# Patient Record
Sex: FEMALE | Race: WHITE | NOT HISPANIC OR LATINO | Employment: FULL TIME | URBAN - NONMETROPOLITAN AREA
[De-identification: names, ages, dates, MRNs, and addresses within clinical notes are randomized per-mention and may not be internally consistent; named-entity substitution may affect disease eponyms.]

---

## 2017-10-13 ENCOUNTER — TRANSCRIBE ORDERS (OUTPATIENT)
Dept: ADMINISTRATIVE | Facility: HOSPITAL | Age: 47
End: 2017-10-13

## 2017-10-13 DIAGNOSIS — Z12.31 VISIT FOR SCREENING MAMMOGRAM: Primary | ICD-10-CM

## 2017-10-19 ENCOUNTER — APPOINTMENT (OUTPATIENT)
Dept: MAMMOGRAPHY | Facility: HOSPITAL | Age: 47
End: 2017-10-19

## 2017-10-23 ENCOUNTER — HOSPITAL ENCOUNTER (OUTPATIENT)
Dept: MAMMOGRAPHY | Facility: HOSPITAL | Age: 47
Discharge: HOME | End: 2017-10-23
Admitting: PHYSICIAN ASSISTANT

## 2017-10-23 DIAGNOSIS — Z12.31 VISIT FOR SCREENING MAMMOGRAM: ICD-10-CM

## 2017-10-23 PROCEDURE — 77063 BREAST TOMOSYNTHESIS BI: CPT

## 2017-10-23 PROCEDURE — 77063 BREAST TOMOSYNTHESIS BI: CPT | Performed by: RADIOLOGY

## 2017-10-23 PROCEDURE — G0202 SCR MAMMO BI INCL CAD: HCPCS

## 2017-10-23 PROCEDURE — 77067 SCR MAMMO BI INCL CAD: CPT | Performed by: RADIOLOGY

## 2018-11-15 ENCOUNTER — HOSPITAL ENCOUNTER (OUTPATIENT)
Dept: MAMMOGRAPHY | Facility: HOSPITAL | Age: 48
Discharge: HOME | End: 2018-11-15
Admitting: PHYSICIAN ASSISTANT

## 2018-11-15 DIAGNOSIS — Z12.39 SCREENING BREAST EXAMINATION: ICD-10-CM

## 2018-11-15 PROCEDURE — 77067 SCR MAMMO BI INCL CAD: CPT | Performed by: RADIOLOGY

## 2018-11-15 PROCEDURE — 77063 BREAST TOMOSYNTHESIS BI: CPT | Performed by: RADIOLOGY

## 2018-11-15 PROCEDURE — 77063 BREAST TOMOSYNTHESIS BI: CPT

## 2018-11-15 PROCEDURE — 77067 SCR MAMMO BI INCL CAD: CPT

## 2019-06-30 ENCOUNTER — TRANSCRIBE ORDERS (OUTPATIENT)
Dept: ADMINISTRATIVE | Facility: HOSPITAL | Age: 49
End: 2019-06-30

## 2019-06-30 ENCOUNTER — HOSPITAL ENCOUNTER (OUTPATIENT)
Dept: GENERAL RADIOLOGY | Facility: HOSPITAL | Age: 49
Discharge: HOME | End: 2019-06-30
Admitting: PHYSICIAN ASSISTANT

## 2019-06-30 DIAGNOSIS — M25.551 RIGHT HIP PAIN: Primary | ICD-10-CM

## 2019-06-30 PROCEDURE — 73502 X-RAY EXAM HIP UNI 2-3 VIEWS: CPT

## 2019-06-30 PROCEDURE — 73502 X-RAY EXAM HIP UNI 2-3 VIEWS: CPT | Performed by: RADIOLOGY

## 2020-01-09 ENCOUNTER — HOSPITAL ENCOUNTER (OUTPATIENT)
Dept: MAMMOGRAPHY | Facility: HOSPITAL | Age: 50
Discharge: HOME | End: 2020-01-09
Admitting: PHYSICIAN ASSISTANT

## 2020-01-09 DIAGNOSIS — Z12.31 VISIT FOR SCREENING MAMMOGRAM: ICD-10-CM

## 2020-01-09 PROCEDURE — 77063 BREAST TOMOSYNTHESIS BI: CPT | Performed by: RADIOLOGY

## 2020-01-09 PROCEDURE — 77063 BREAST TOMOSYNTHESIS BI: CPT

## 2020-01-09 PROCEDURE — 77067 SCR MAMMO BI INCL CAD: CPT

## 2020-01-09 PROCEDURE — 77067 SCR MAMMO BI INCL CAD: CPT | Performed by: RADIOLOGY

## 2020-01-31 ENCOUNTER — LAB (OUTPATIENT)
Dept: LAB | Facility: HOSPITAL | Age: 50
End: 2020-01-31

## 2020-01-31 ENCOUNTER — TRANSCRIBE ORDERS (OUTPATIENT)
Dept: ADMINISTRATIVE | Facility: HOSPITAL | Age: 50
End: 2020-01-31

## 2020-01-31 DIAGNOSIS — E03.9 HYPOTHYROIDISM, UNSPECIFIED TYPE: Primary | ICD-10-CM

## 2020-01-31 DIAGNOSIS — E03.9 HYPOTHYROIDISM, UNSPECIFIED TYPE: ICD-10-CM

## 2020-01-31 LAB — TSH SERPL DL<=0.05 MIU/L-ACNC: 0.22 UIU/ML (ref 0.27–4.2)

## 2020-01-31 PROCEDURE — 36415 COLL VENOUS BLD VENIPUNCTURE: CPT

## 2020-01-31 PROCEDURE — 84443 ASSAY THYROID STIM HORMONE: CPT

## 2020-02-19 ENCOUNTER — TRANSCRIBE ORDERS (OUTPATIENT)
Dept: ADMINISTRATIVE | Facility: HOSPITAL | Age: 50
End: 2020-02-19

## 2020-02-19 DIAGNOSIS — R13.10 DYSPHAGIA, UNSPECIFIED TYPE: Primary | ICD-10-CM

## 2020-02-21 ENCOUNTER — HOSPITAL ENCOUNTER (OUTPATIENT)
Dept: ULTRASOUND IMAGING | Facility: HOSPITAL | Age: 50
Discharge: HOME | End: 2020-02-21
Admitting: PHYSICIAN ASSISTANT

## 2020-02-21 DIAGNOSIS — R13.10 DYSPHAGIA, UNSPECIFIED TYPE: ICD-10-CM

## 2020-02-21 PROCEDURE — 76536 US EXAM OF HEAD AND NECK: CPT

## 2020-02-21 PROCEDURE — 76536 US EXAM OF HEAD AND NECK: CPT | Performed by: RADIOLOGY

## 2020-08-20 ENCOUNTER — OFFICE VISIT (OUTPATIENT)
Dept: SURGERY | Facility: CLINIC | Age: 50
End: 2020-08-20

## 2020-08-20 VITALS — BODY MASS INDEX: 30.45 KG/M2 | WEIGHT: 194 LBS | HEIGHT: 67 IN

## 2020-08-20 DIAGNOSIS — E65 ABDOMINAL PANNUS: Primary | ICD-10-CM

## 2020-08-20 PROCEDURE — 99203 OFFICE O/P NEW LOW 30 MIN: CPT | Performed by: SURGERY

## 2020-08-20 RX ORDER — SUMATRIPTAN 50 MG/1
50 TABLET, FILM COATED ORAL
Status: ON HOLD | COMMUNITY
End: 2020-11-03

## 2020-08-20 RX ORDER — GABAPENTIN 600 MG/1
600 TABLET ORAL 3 TIMES DAILY
COMMUNITY
Start: 2015-07-09 | End: 2021-08-06

## 2020-08-20 RX ORDER — OXYBUTYNIN CHLORIDE 5 MG/1
5 TABLET ORAL 2 TIMES DAILY
COMMUNITY
Start: 2015-07-09 | End: 2022-11-11 | Stop reason: SDUPTHER

## 2020-08-20 RX ORDER — BUPROPION HYDROCHLORIDE 100 MG/1
100 TABLET ORAL 3 TIMES DAILY
Status: ON HOLD | COMMUNITY
Start: 2015-07-09 | End: 2020-11-03

## 2020-08-20 RX ORDER — MELOXICAM 15 MG/1
15 TABLET ORAL DAILY
COMMUNITY
Start: 2015-07-09 | End: 2021-08-06

## 2020-08-20 RX ORDER — MONTELUKAST SODIUM 10 MG/1
10 TABLET ORAL NIGHTLY
COMMUNITY
End: 2021-08-06

## 2020-08-20 RX ORDER — LEVOTHYROXINE SODIUM 137 UG/1
112 TABLET ORAL DAILY
Status: ON HOLD | COMMUNITY
Start: 2015-07-09 | End: 2020-11-03

## 2020-08-24 PROBLEM — E65 ABDOMINAL PANNUS: Status: ACTIVE | Noted: 2020-08-24

## 2020-08-24 NOTE — PROGRESS NOTES
Subjective   Karla Connell is a 49 y.o. female is being seen for consultation today for excess skin self referral.    History of Present Illness  Ms. Connell was seen in the office today to discuss excision of excess skin.  The patient has lost over 100 pounds with diet and exercise.  She is at her goal weight and has been for several months.  The patient reports recurrent fungal infections under her pannus for which she uses topical treatments on a regular basis.  Patient also has back problems and a disc problem on her right side.  She states that her hanging skin exacerbates her back pain.  No Known Allergies  Current Outpatient Medications   Medication Sig Dispense Refill   • buPROPion (WELLBUTRIN) 100 MG tablet Take  by mouth.     • gabapentin (NEURONTIN) 600 MG tablet Take  by mouth Every 8 (Eight) Hours.     • levothyroxine (SYNTHROID, LEVOTHROID) 137 MCG tablet Take  by mouth.     • meloxicam (MOBIC) 15 MG tablet Take  by mouth.     • montelukast (SINGULAIR) 10 MG tablet Take 10 mg by mouth Every Night.     • oxybutynin (DITROPAN) 5 MG tablet Take  by mouth Every 12 (Twelve) Hours.     • SUMAtriptan (IMITREX) 50 MG tablet Take 50 mg by mouth Every 2 (Two) Hours As Needed for Migraine. Take one tablet at onset of headache. May repeat dose one time in 2 hours if headache not relieved.     • amoxicillin (AMOXIL) 875 MG tablet TAKE 1 TABLET EVERY 12 HOURS DAILY. 20 tablet 0   • cefdinir (OMNICEF) 300 MG capsule Take 1 capsule by mouth 2 (Two) Times a Day for 10 days. 20 capsule 0   • chlorhexidine (PERIDEX) 0.12 % solution RINSE MOUTH WITH 15ML(1 CAPFUL) FOR 30 SECONDS IN THE MORNING & AFTERNOON AFTER BRUSHING. EXPECTORATE AFTER RINSING, DO NOT SWALLOW. 473 mL 0   • hepatitis A (HAVRIX) 1440 EL U/ML vaccine Inject 1 mL into the appropriate muscle as directed by prescriber. 1 mL 1     No current facility-administered medications for this visit.      Past Medical History:   Diagnosis Date   • Bradycardia      Past  "Surgical History:   Procedure Laterality Date   •  SECTION     • LAPAROSCOPIC CHOLECYSTECTOMY     • THYROID SURGERY       Review of Systems  General: weight gain 5 lbs  Integumentary: rash  Eyes: eyesight problems, glasses  ENT: negative  Respiratory: negative  Gastrointestinal: negative  Cardiovascular: slow heart rate  Neurological: negative  Psychiatric: anxiety and depression  Hematologic/Lymphatic: negative  Genitourinary: frequent urination  Musculoskeletal: back pain and joint stiffness  Endocrine: history of thyroid problem and underactive thyroid  Breasts: negative        Objective   Ht 170.2 cm (67\")   Wt 88 kg (194 lb)   BMI 30.38 kg/m²   Physical Exam  General:  This is a WD WN female in no acute distress  HEENT exam:  WNL. Sclera are anicteric.  EOMI  Neck:  supple, FROM, without thyromegaly, cervical or supraclavicular adenopathy  Lungs:  Respiratory effort normal. Auscultation: Clear, without wheezes, rhonchi, rales  Heart:  Regular rate and rhythm, without murmur, gallop, rub.  No pedal edema  Abdomen: Bowel sounds present.  No palpable mass.  No evidence of hernia  Musculoskeletal:  muscle strength/tone is normal.  Gait and station: normal. No digital cyanosis  Psyc:  alert, oriented x 3.  Mood and affect are appropriate  skin:  Warm with good turgor.  Without rash or lesion.  Patient does have an abdominal pannus which hangs below her pubis  extremities:  Examination of the extremities revealed no cyanosis, clubbing or edema.  Results/Data    Procedures       Assessment/Plan   Symptomatic abdominal pannus    Patient would benefit from panniculectomy       Discussion/Summary    Patient's Body mass index is 30.38 kg/m². BMI is above normal parameters. Recommendations include: educational material.       No future appointments.      Please note that portions of this note were completed with a voice recognition program.  "

## 2020-10-15 ENCOUNTER — PREP FOR SURGERY (OUTPATIENT)
Dept: OTHER | Facility: HOSPITAL | Age: 50
End: 2020-10-15

## 2020-10-15 DIAGNOSIS — E65 ABDOMINAL PANNUS: Primary | ICD-10-CM

## 2020-10-15 RX ORDER — CEFAZOLIN SODIUM 2 G/50ML
2 SOLUTION INTRAVENOUS ONCE
Status: CANCELLED | OUTPATIENT
Start: 2020-10-15 | End: 2020-10-15

## 2020-10-27 DIAGNOSIS — Z01.818 PRE-OP TESTING: Primary | ICD-10-CM

## 2020-10-30 ENCOUNTER — TRANSCRIBE ORDERS (OUTPATIENT)
Dept: ADMINISTRATIVE | Facility: HOSPITAL | Age: 50
End: 2020-10-30

## 2020-10-30 ENCOUNTER — OFFICE VISIT (OUTPATIENT)
Dept: SURGERY | Facility: CLINIC | Age: 50
End: 2020-10-30

## 2020-10-30 ENCOUNTER — APPOINTMENT (OUTPATIENT)
Dept: PREADMISSION TESTING | Facility: HOSPITAL | Age: 50
End: 2020-10-30

## 2020-10-30 ENCOUNTER — LAB (OUTPATIENT)
Dept: LAB | Facility: HOSPITAL | Age: 50
End: 2020-10-30

## 2020-10-30 VITALS
HEIGHT: 67 IN | SYSTOLIC BLOOD PRESSURE: 149 MMHG | BODY MASS INDEX: 30.61 KG/M2 | HEART RATE: 58 BPM | DIASTOLIC BLOOD PRESSURE: 89 MMHG | WEIGHT: 195 LBS

## 2020-10-30 DIAGNOSIS — D48.5 NEOPLASM OF UNCERTAIN BEHAVIOR OF SKIN: ICD-10-CM

## 2020-10-30 DIAGNOSIS — Z01.818 PRE-OP TESTING: ICD-10-CM

## 2020-10-30 DIAGNOSIS — E65 ABDOMINAL PANNUS: Primary | ICD-10-CM

## 2020-10-30 DIAGNOSIS — E65 ABDOMINAL PANNUS: ICD-10-CM

## 2020-10-30 DIAGNOSIS — E03.4 ATROPHY OF THYROID (ACQUIRED): ICD-10-CM

## 2020-10-30 DIAGNOSIS — E03.4 ATROPHY OF THYROID (ACQUIRED): Primary | ICD-10-CM

## 2020-10-30 LAB
ANION GAP SERPL CALCULATED.3IONS-SCNC: 9.8 MMOL/L (ref 5–15)
BUN SERPL-MCNC: 14 MG/DL (ref 6–20)
BUN/CREAT SERPL: 12.7 (ref 7–25)
CALCIUM SPEC-SCNC: 9.2 MG/DL (ref 8.6–10.5)
CHLORIDE SERPL-SCNC: 104 MMOL/L (ref 98–107)
CO2 SERPL-SCNC: 27.2 MMOL/L (ref 22–29)
CREAT SERPL-MCNC: 1.1 MG/DL (ref 0.57–1)
DEPRECATED RDW RBC AUTO: 46.9 FL (ref 37–54)
ERYTHROCYTE [DISTWIDTH] IN BLOOD BY AUTOMATED COUNT: 13.9 % (ref 12.3–15.4)
GFR SERPL CREATININE-BSD FRML MDRD: 53 ML/MIN/1.73
GLUCOSE SERPL-MCNC: 74 MG/DL (ref 65–99)
HCT VFR BLD AUTO: 43.4 % (ref 34–46.6)
HGB BLD-MCNC: 13.8 G/DL (ref 12–15.9)
MCH RBC QN AUTO: 29.2 PG (ref 26.6–33)
MCHC RBC AUTO-ENTMCNC: 31.8 G/DL (ref 31.5–35.7)
MCV RBC AUTO: 91.8 FL (ref 79–97)
PLATELET # BLD AUTO: 288 10*3/MM3 (ref 140–450)
PMV BLD AUTO: 10.4 FL (ref 6–12)
POTASSIUM SERPL-SCNC: 4.3 MMOL/L (ref 3.5–5.2)
RBC # BLD AUTO: 4.73 10*6/MM3 (ref 3.77–5.28)
SODIUM SERPL-SCNC: 141 MMOL/L (ref 136–145)
TSH SERPL DL<=0.05 MIU/L-ACNC: 0.6 UIU/ML (ref 0.27–4.2)
WBC # BLD AUTO: 9.41 10*3/MM3 (ref 3.4–10.8)

## 2020-10-30 PROCEDURE — U0004 COV-19 TEST NON-CDC HGH THRU: HCPCS | Performed by: SURGERY

## 2020-10-30 PROCEDURE — 36415 COLL VENOUS BLD VENIPUNCTURE: CPT

## 2020-10-30 PROCEDURE — 99213 OFFICE O/P EST LOW 20 MIN: CPT | Performed by: SURGERY

## 2020-10-30 PROCEDURE — 80048 BASIC METABOLIC PNL TOTAL CA: CPT

## 2020-10-30 PROCEDURE — 85027 COMPLETE CBC AUTOMATED: CPT

## 2020-10-30 PROCEDURE — 84443 ASSAY THYROID STIM HORMONE: CPT

## 2020-10-30 PROCEDURE — C9803 HOPD COVID-19 SPEC COLLECT: HCPCS

## 2020-10-30 NOTE — PROGRESS NOTES
Subjective   Karla Connell is a 49 y.o. female is here today for follow-up for H and P.    History of Present Illness  Ms. Connell was seen in the office today for her preoperative visit prior to panniculectomy. The patient has lost over 100 pounds with diet and exercise.  She is at her goal weight and has been for several months.  The patient reports recurrent fungal infections under her pannus for which she uses topical treatments on a regular basis.  Patient also has back problems and a disc problem on her right side.  She states that her hanging skin exacerbates her back pain.  The patient was initially seen for this problem on 8/20/2020.  She denies any changes in her health, examination or medication list since that time.  No Known Allergies      Current Outpatient Medications   Medication Sig Dispense Refill   • buPROPion (WELLBUTRIN) 100 MG tablet Take  by mouth.     • estrogen, conjugated,-medroxyprogesterone (PREMPRO) 0.3-1.5 MG per tablet Take 1 tablet by mouth Daily.     • gabapentin (NEURONTIN) 600 MG tablet Take  by mouth Every 8 (Eight) Hours.     • levothyroxine (SYNTHROID, LEVOTHROID) 137 MCG tablet Take  by mouth.     • meloxicam (MOBIC) 15 MG tablet Take  by mouth.     • montelukast (SINGULAIR) 10 MG tablet Take 10 mg by mouth Every Night.     • oxybutynin (DITROPAN) 5 MG tablet Take  by mouth Every 12 (Twelve) Hours.     • SUMAtriptan (IMITREX) 50 MG tablet Take 50 mg by mouth Every 2 (Two) Hours As Needed for Migraine. Take one tablet at onset of headache. May repeat dose one time in 2 hours if headache not relieved.     • amoxicillin (AMOXIL) 875 MG tablet TAKE 1 TABLET EVERY 12 HOURS DAILY. 20 tablet 0   • cefdinir (OMNICEF) 300 MG capsule Take 1 capsule by mouth 2 (Two) Times a Day for 10 days. 20 capsule 0   • chlorhexidine (PERIDEX) 0.12 % solution RINSE MOUTH WITH 15ML(1 CAPFUL) FOR 30 SECONDS IN THE MORNING & AFTERNOON AFTER BRUSHING. EXPECTORATE AFTER RINSING, DO NOT SWALLOW. 473 mL 0  "  • hepatitis A (HAVRIX) 1440 EL U/ML vaccine Inject 1 mL into the appropriate muscle as directed by prescriber. 1 mL 1     No current facility-administered medications for this visit.      Past Medical History:   Diagnosis Date   • Bradycardia      Past Surgical History:   Procedure Laterality Date   •  SECTION     • LAPAROSCOPIC CHOLECYSTECTOMY     • THYROID SURGERY         The following portions of the patient's history were reviewed and updated as appropriate: allergies, current medications, past family history, past medical history, past social history, past surgical history and problem list.    Review of Systems  General: negative  Integumentary: negative  Eyes: negative  ENT: negative  Respiratory: negative  Gastrointestinal: negative  Cardiovascular: slow heart rate  Neurological: negative  Psychiatric: negative  Hematologic/Lymphatic: easy bruising  Genitourinary: negative  Musculoskeletal: negative  Endocrine: history of thyroid problem  Breasts: negative    Objective   Ht 170.2 cm (67\")   Wt 88.5 kg (195 lb)   BMI 30.54 kg/m²    Physical Exam  General:  This is a WD WN female in no acute distress  HEENT exam:  WNL. Sclera are anicteric.  EOMI  Neck:  supple, FROM, without thyromegaly, cervical or supraclavicular adenopathy  Lungs:  Respiratory effort normal. Auscultation: Clear, without wheezes, rhonchi, rales  Heart:  Regular rate and rhythm, without murmur, gallop, rub.  No pedal edema  Abdomen: Bowel sounds present.  No evidence of abdominal hernia  Musculoskeletal:  muscle strength/tone is normal.  Gait and station: normal. No digital cyanosis  Psyc:  alert, oriented x 3.  Mood and affect are appropriate  skin:  Warm with good turgor.  In the lower abdomen on the left side at the level of the groin there is a large irregular skin lesion which is slightly raised.  Patient has a 3 tier abdominal pannus which hangs well below the pubis  extremities:  Examination of the extremities revealed no " cyanosis, clubbing or edema.  Results/Data      Procedures     Assessment/Plan   Symptomatic abdominal pannus  Neoplasm of uncertain behavior of skin left lower abdominal wall    Proceed with panniculectomy - will remove skin lesion as part of skin excision         Discussion/Summary: The risks of the surgical procedure were discussed.  Options of alternative treatments including no treatment (if applicable) were discussed.  Patient voiced understanding of the above issues and wishes to proceed    Patient's Body mass index is 30.54 kg/m². BMI is above normal parameters. Recommendations include: educational material.         Future Appointments   Date Time Provider Department Center   10/30/2020 11:30 AM PAT 3 COR [facility] PAT [facility]   10/30/2020  1:00 PM C19 PRE SCREEN COR [facility] C19PS COR         Please note that portions of this note were completed with a voice recognition program.

## 2020-10-30 NOTE — H&P
Subjective   Karla Connell is a 49 y.o. female is here today for follow-up for H and P.    History of Present Illness  Ms. Connell was seen in the office today for her preoperative visit prior to panniculectomy. The patient has lost over 100 pounds with diet and exercise.  She is at her goal weight and has been for several months.  The patient reports recurrent fungal infections under her pannus for which she uses topical treatments on a regular basis.  Patient also has back problems and a disc problem on her right side.  She states that her hanging skin exacerbates her back pain.  The patient was initially seen for this problem on 8/20/2020.  She denies any changes in her health, examination or medication list since that time.  No Known Allergies      Current Outpatient Medications   Medication Sig Dispense Refill   • buPROPion (WELLBUTRIN) 100 MG tablet Take  by mouth.     • estrogen, conjugated,-medroxyprogesterone (PREMPRO) 0.3-1.5 MG per tablet Take 1 tablet by mouth Daily.     • gabapentin (NEURONTIN) 600 MG tablet Take  by mouth Every 8 (Eight) Hours.     • levothyroxine (SYNTHROID, LEVOTHROID) 137 MCG tablet Take  by mouth.     • meloxicam (MOBIC) 15 MG tablet Take  by mouth.     • montelukast (SINGULAIR) 10 MG tablet Take 10 mg by mouth Every Night.     • oxybutynin (DITROPAN) 5 MG tablet Take  by mouth Every 12 (Twelve) Hours.     • SUMAtriptan (IMITREX) 50 MG tablet Take 50 mg by mouth Every 2 (Two) Hours As Needed for Migraine. Take one tablet at onset of headache. May repeat dose one time in 2 hours if headache not relieved.     • amoxicillin (AMOXIL) 875 MG tablet TAKE 1 TABLET EVERY 12 HOURS DAILY. 20 tablet 0   • cefdinir (OMNICEF) 300 MG capsule Take 1 capsule by mouth 2 (Two) Times a Day for 10 days. 20 capsule 0   • chlorhexidine (PERIDEX) 0.12 % solution RINSE MOUTH WITH 15ML(1 CAPFUL) FOR 30 SECONDS IN THE MORNING & AFTERNOON AFTER BRUSHING. EXPECTORATE AFTER RINSING, DO NOT SWALLOW. 473 mL 0  "  • hepatitis A (HAVRIX) 1440 EL U/ML vaccine Inject 1 mL into the appropriate muscle as directed by prescriber. 1 mL 1     No current facility-administered medications for this visit.      Past Medical History:   Diagnosis Date   • Bradycardia      Past Surgical History:   Procedure Laterality Date   •  SECTION     • LAPAROSCOPIC CHOLECYSTECTOMY     • THYROID SURGERY         The following portions of the patient's history were reviewed and updated as appropriate: allergies, current medications, past family history, past medical history, past social history, past surgical history and problem list.    Review of Systems  General: negative  Integumentary: negative  Eyes: negative  ENT: negative  Respiratory: negative  Gastrointestinal: negative  Cardiovascular: slow heart rate  Neurological: negative  Psychiatric: negative  Hematologic/Lymphatic: easy bruising  Genitourinary: negative  Musculoskeletal: negative  Endocrine: history of thyroid problem  Breasts: negative    Objective   Ht 170.2 cm (67\")   Wt 88.5 kg (195 lb)   BMI 30.54 kg/m²    Physical Exam  General:  This is a WD WN female in no acute distress  HEENT exam:  WNL. Sclera are anicteric.  EOMI  Neck:  supple, FROM, without thyromegaly, cervical or supraclavicular adenopathy  Lungs:  Respiratory effort normal. Auscultation: Clear, without wheezes, rhonchi, rales  Heart:  Regular rate and rhythm, without murmur, gallop, rub.  No pedal edema  Abdomen: Bowel sounds present.  No evidence of abdominal hernia  Musculoskeletal:  muscle strength/tone is normal.  Gait and station: normal. No digital cyanosis  Psyc:  alert, oriented x 3.  Mood and affect are appropriate  skin:  Warm with good turgor.  In the lower abdomen on the left side at the level of the groin there is a large irregular skin lesion which is slightly raised.  Patient has a 3 tier abdominal pannus which hangs well below the pubis  extremities:  Examination of the extremities revealed no " cyanosis, clubbing or edema.  Results/Data      Procedures     Assessment/Plan   Symptomatic abdominal pannus  Neoplasm of uncertain behavior of skin left lower abdominal wall    Proceed with panniculectomy - will remove skin lesion as part of skin excision         Discussion/Summary: The risks of the surgical procedure were discussed.  Options of alternative treatments including no treatment (if applicable) were discussed.  Patient voiced understanding of the above issues and wishes to proceed    Patient's Body mass index is 30.54 kg/m². BMI is above normal parameters. Recommendations include: educational material.         Future Appointments   Date Time Provider Department Center   10/30/2020 11:30 AM PAT 3 COR [facility] PAT [facility]   10/30/2020  1:00 PM C19 PRE SCREEN COR [facility] C19PS COR         Please note that portions of this note were completed with a voice recognition program.  This document has been electronically signed by Queenie CHIN MD on October 30, 2020 11:03 EDT

## 2020-10-31 LAB — SARS-COV-2 RNA RESP QL NAA+PROBE: NOT DETECTED

## 2020-11-02 ENCOUNTER — TELEPHONE (OUTPATIENT)
Dept: SURGERY | Facility: CLINIC | Age: 50
End: 2020-11-02

## 2020-11-03 ENCOUNTER — ANESTHESIA EVENT (OUTPATIENT)
Dept: PERIOP | Facility: HOSPITAL | Age: 50
End: 2020-11-03

## 2020-11-03 ENCOUNTER — HOSPITAL ENCOUNTER (OUTPATIENT)
Facility: HOSPITAL | Age: 50
Discharge: HOME | End: 2020-11-04
Attending: SURGERY | Admitting: SURGERY

## 2020-11-03 ENCOUNTER — ANESTHESIA (OUTPATIENT)
Dept: PERIOP | Facility: HOSPITAL | Age: 50
End: 2020-11-03

## 2020-11-03 DIAGNOSIS — E65 ABDOMINAL PANNUS: ICD-10-CM

## 2020-11-03 LAB
B-HCG UR QL: NEGATIVE
INTERNAL NEGATIVE CONTROL: NEGATIVE
INTERNAL POSITIVE CONTROL: POSITIVE
Lab: NORMAL

## 2020-11-03 PROCEDURE — 25010000002 ONDANSETRON PER 1 MG: Performed by: NURSE ANESTHETIST, CERTIFIED REGISTERED

## 2020-11-03 PROCEDURE — 25010000002 KETOROLAC TROMETHAMINE PER 15 MG: Performed by: NURSE ANESTHETIST, CERTIFIED REGISTERED

## 2020-11-03 PROCEDURE — G0378 HOSPITAL OBSERVATION PER HR: HCPCS

## 2020-11-03 PROCEDURE — 81025 URINE PREGNANCY TEST: CPT | Performed by: ANESTHESIOLOGY

## 2020-11-03 PROCEDURE — C1889 IMPLANT/INSERT DEVICE, NOC: HCPCS | Performed by: SURGERY

## 2020-11-03 PROCEDURE — 0: Performed by: SURGERY

## 2020-11-03 PROCEDURE — 25010000002 BUPRENORPHINE PER 0.1 MG: Performed by: ANESTHESIOLOGY

## 2020-11-03 PROCEDURE — 25010000002 ONDANSETRON PER 1 MG: Performed by: SURGERY

## 2020-11-03 PROCEDURE — 25010000002 ROPIVACAINE PER 1 MG: Performed by: ANESTHESIOLOGY

## 2020-11-03 PROCEDURE — 25010000002 DEXAMETHASONE PER 1 MG: Performed by: ANESTHESIOLOGY

## 2020-11-03 PROCEDURE — 25010000003 CEFAZOLIN SODIUM-DEXTROSE 2-3 GM-%(50ML) RECONSTITUTED SOLUTION: Performed by: SURGERY

## 2020-11-03 PROCEDURE — 25010000002 MIDAZOLAM PER 1 MG: Performed by: NURSE ANESTHETIST, CERTIFIED REGISTERED

## 2020-11-03 PROCEDURE — 25010000002 FENTANYL CITRATE (PF) 100 MCG/2ML SOLUTION: Performed by: NURSE ANESTHETIST, CERTIFIED REGISTERED

## 2020-11-03 PROCEDURE — 15830 EXC EXCESSIVE SKIN ABDOMEN: CPT | Performed by: SURGERY

## 2020-11-03 PROCEDURE — 25010000002 DEXAMETHASONE PER 1 MG: Performed by: NURSE ANESTHETIST, CERTIFIED REGISTERED

## 2020-11-03 PROCEDURE — 25010000002 PROPOFOL 10 MG/ML EMULSION: Performed by: NURSE ANESTHETIST, CERTIFIED REGISTERED

## 2020-11-03 DEVICE — LIGACLIP EXTRA LIGATING CLIP CARTRIDGES: 6 TITANIUM CLIPS/ CARTRIDGE (MEDIUM/LARGE)
Type: IMPLANTABLE DEVICE | Status: FUNCTIONAL
Brand: LIGACLIP

## 2020-11-03 DEVICE — LIGACLIP EXTRA LIGATING CLIP CARTRIDGES: 6 TITANIUM CLIPS/ CARTRIDGE (MEDIUM)
Type: IMPLANTABLE DEVICE | Status: FUNCTIONAL
Brand: LIGACLIP

## 2020-11-03 DEVICE — LIGACLIP EXTRA LIGATING CLIP CARTRIDGES: 6 TITANIUM CLIPS/ CARTRIDGE (SMALL)
Type: IMPLANTABLE DEVICE | Status: FUNCTIONAL
Brand: LIGACLIP

## 2020-11-03 RX ORDER — SUMATRIPTAN 25 MG/1
25 TABLET, FILM COATED ORAL
COMMUNITY
End: 2021-08-06

## 2020-11-03 RX ORDER — SODIUM CHLORIDE 0.9 % (FLUSH) 0.9 %
10 SYRINGE (ML) INJECTION EVERY 12 HOURS SCHEDULED
Status: DISCONTINUED | OUTPATIENT
Start: 2020-11-03 | End: 2020-11-03 | Stop reason: HOSPADM

## 2020-11-03 RX ORDER — DOCUSATE SODIUM 100 MG/1
100 CAPSULE, LIQUID FILLED ORAL 2 TIMES DAILY
Status: DISCONTINUED | OUTPATIENT
Start: 2020-11-03 | End: 2020-11-04 | Stop reason: HOSPADM

## 2020-11-03 RX ORDER — CEFAZOLIN SODIUM 2 G/50ML
2 SOLUTION INTRAVENOUS ONCE
Status: COMPLETED | OUTPATIENT
Start: 2020-11-03 | End: 2020-11-03

## 2020-11-03 RX ORDER — SODIUM CHLORIDE, SODIUM LACTATE, POTASSIUM CHLORIDE, CALCIUM CHLORIDE 600; 310; 30; 20 MG/100ML; MG/100ML; MG/100ML; MG/100ML
125 INJECTION, SOLUTION INTRAVENOUS CONTINUOUS
Status: DISCONTINUED | OUTPATIENT
Start: 2020-11-03 | End: 2020-11-03 | Stop reason: SDUPTHER

## 2020-11-03 RX ORDER — SUMATRIPTAN 50 MG/1
25 TABLET, FILM COATED ORAL
Status: CANCELLED | OUTPATIENT
Start: 2020-11-03

## 2020-11-03 RX ORDER — CEFAZOLIN SODIUM 2 G/50ML
2 SOLUTION INTRAVENOUS EVERY 8 HOURS
Status: COMPLETED | OUTPATIENT
Start: 2020-11-03 | End: 2020-11-04

## 2020-11-03 RX ORDER — LEVOTHYROXINE SODIUM 0.07 MG/1
112 TABLET ORAL DAILY
Status: CANCELLED | OUTPATIENT
Start: 2020-11-03

## 2020-11-03 RX ORDER — ONDANSETRON 2 MG/ML
INJECTION INTRAMUSCULAR; INTRAVENOUS AS NEEDED
Status: DISCONTINUED | OUTPATIENT
Start: 2020-11-03 | End: 2020-11-03 | Stop reason: SURG

## 2020-11-03 RX ORDER — KETOROLAC TROMETHAMINE 30 MG/ML
30 INJECTION, SOLUTION INTRAMUSCULAR; INTRAVENOUS EVERY 6 HOURS PRN
Status: COMPLETED | OUTPATIENT
Start: 2020-11-03 | End: 2020-11-03

## 2020-11-03 RX ORDER — DEXAMETHASONE SODIUM PHOSPHATE 4 MG/ML
INJECTION, SOLUTION INTRA-ARTICULAR; INTRALESIONAL; INTRAMUSCULAR; INTRAVENOUS; SOFT TISSUE
Status: COMPLETED | OUTPATIENT
Start: 2020-11-03 | End: 2020-11-03

## 2020-11-03 RX ORDER — POLYETHYLENE GLYCOL 3350 17 G/17G
17 POWDER, FOR SOLUTION ORAL 2 TIMES DAILY
Status: DISCONTINUED | OUTPATIENT
Start: 2020-11-03 | End: 2020-11-04 | Stop reason: HOSPADM

## 2020-11-03 RX ORDER — SODIUM CHLORIDE, SODIUM LACTATE, POTASSIUM CHLORIDE, CALCIUM CHLORIDE 600; 310; 30; 20 MG/100ML; MG/100ML; MG/100ML; MG/100ML
100 INJECTION, SOLUTION INTRAVENOUS ONCE AS NEEDED
Status: DISCONTINUED | OUTPATIENT
Start: 2020-11-03 | End: 2020-11-03 | Stop reason: HOSPADM

## 2020-11-03 RX ORDER — HEPARIN SODIUM 5000 [USP'U]/ML
5000 INJECTION, SOLUTION INTRAVENOUS; SUBCUTANEOUS EVERY 12 HOURS SCHEDULED
Status: DISCONTINUED | OUTPATIENT
Start: 2020-11-04 | End: 2020-11-04 | Stop reason: HOSPADM

## 2020-11-03 RX ORDER — LEVOTHYROXINE SODIUM 0.07 MG/1
112 TABLET ORAL EVERY MORNING
Status: DISCONTINUED | OUTPATIENT
Start: 2020-11-04 | End: 2020-11-04 | Stop reason: HOSPADM

## 2020-11-03 RX ORDER — SODIUM CHLORIDE 0.9 % (FLUSH) 0.9 %
3 SYRINGE (ML) INJECTION EVERY 12 HOURS SCHEDULED
Status: DISCONTINUED | OUTPATIENT
Start: 2020-11-03 | End: 2020-11-04 | Stop reason: HOSPADM

## 2020-11-03 RX ORDER — FENTANYL CITRATE 50 UG/ML
50 INJECTION, SOLUTION INTRAMUSCULAR; INTRAVENOUS
Status: COMPLETED | OUTPATIENT
Start: 2020-11-03 | End: 2020-11-03

## 2020-11-03 RX ORDER — BUPROPION HYDROCHLORIDE 200 MG/1
200 TABLET, EXTENDED RELEASE ORAL 2 TIMES DAILY
COMMUNITY
End: 2021-08-06

## 2020-11-03 RX ORDER — FENTANYL CITRATE 50 UG/ML
INJECTION, SOLUTION INTRAMUSCULAR; INTRAVENOUS AS NEEDED
Status: DISCONTINUED | OUTPATIENT
Start: 2020-11-03 | End: 2020-11-03 | Stop reason: SURG

## 2020-11-03 RX ORDER — MAGNESIUM HYDROXIDE 1200 MG/15ML
LIQUID ORAL AS NEEDED
Status: DISCONTINUED | OUTPATIENT
Start: 2020-11-03 | End: 2020-11-03 | Stop reason: HOSPADM

## 2020-11-03 RX ORDER — SODIUM CHLORIDE 0.9 % (FLUSH) 0.9 %
10 SYRINGE (ML) INJECTION AS NEEDED
Status: DISCONTINUED | OUTPATIENT
Start: 2020-11-03 | End: 2020-11-04 | Stop reason: HOSPADM

## 2020-11-03 RX ORDER — BUPROPION HYDROCHLORIDE 100 MG/1
200 TABLET, EXTENDED RELEASE ORAL EVERY 12 HOURS SCHEDULED
Status: DISCONTINUED | OUTPATIENT
Start: 2020-11-03 | End: 2020-11-04 | Stop reason: HOSPADM

## 2020-11-03 RX ORDER — LIDOCAINE HYDROCHLORIDE 20 MG/ML
INJECTION, SOLUTION EPIDURAL; INFILTRATION; INTRACAUDAL; PERINEURAL AS NEEDED
Status: DISCONTINUED | OUTPATIENT
Start: 2020-11-03 | End: 2020-11-03 | Stop reason: SURG

## 2020-11-03 RX ORDER — ONDANSETRON 2 MG/ML
4 INJECTION INTRAMUSCULAR; INTRAVENOUS AS NEEDED
Status: DISCONTINUED | OUTPATIENT
Start: 2020-11-03 | End: 2020-11-03 | Stop reason: HOSPADM

## 2020-11-03 RX ORDER — OXYCODONE HYDROCHLORIDE AND ACETAMINOPHEN 5; 325 MG/1; MG/1
1 TABLET ORAL ONCE AS NEEDED
Status: DISCONTINUED | OUTPATIENT
Start: 2020-11-03 | End: 2020-11-03 | Stop reason: HOSPADM

## 2020-11-03 RX ORDER — PROPOFOL 10 MG/ML
VIAL (ML) INTRAVENOUS AS NEEDED
Status: DISCONTINUED | OUTPATIENT
Start: 2020-11-03 | End: 2020-11-03 | Stop reason: SURG

## 2020-11-03 RX ORDER — VECURONIUM BROMIDE 1 MG/ML
INJECTION, POWDER, LYOPHILIZED, FOR SOLUTION INTRAVENOUS AS NEEDED
Status: DISCONTINUED | OUTPATIENT
Start: 2020-11-03 | End: 2020-11-03 | Stop reason: SURG

## 2020-11-03 RX ORDER — SUMATRIPTAN 50 MG/1
25 TABLET, FILM COATED ORAL
Status: DISCONTINUED | OUTPATIENT
Start: 2020-11-03 | End: 2020-11-04 | Stop reason: HOSPADM

## 2020-11-03 RX ORDER — DEXAMETHASONE SODIUM PHOSPHATE 10 MG/ML
INJECTION INTRAMUSCULAR; INTRAVENOUS AS NEEDED
Status: DISCONTINUED | OUTPATIENT
Start: 2020-11-03 | End: 2020-11-03 | Stop reason: SURG

## 2020-11-03 RX ORDER — MIDAZOLAM HYDROCHLORIDE 1 MG/ML
1 INJECTION INTRAMUSCULAR; INTRAVENOUS
Status: DISCONTINUED | OUTPATIENT
Start: 2020-11-03 | End: 2020-11-03 | Stop reason: HOSPADM

## 2020-11-03 RX ORDER — PANTOPRAZOLE SODIUM 40 MG/1
40 TABLET, DELAYED RELEASE ORAL
Status: DISCONTINUED | OUTPATIENT
Start: 2020-11-04 | End: 2020-11-04 | Stop reason: HOSPADM

## 2020-11-03 RX ORDER — ROPIVACAINE HYDROCHLORIDE 5 MG/ML
INJECTION, SOLUTION EPIDURAL; INFILTRATION; PERINEURAL
Status: COMPLETED | OUTPATIENT
Start: 2020-11-03 | End: 2020-11-03

## 2020-11-03 RX ORDER — GABAPENTIN 300 MG/1
600 CAPSULE ORAL EVERY 8 HOURS SCHEDULED
Status: DISCONTINUED | OUTPATIENT
Start: 2020-11-03 | End: 2020-11-04 | Stop reason: HOSPADM

## 2020-11-03 RX ORDER — OXYCODONE HYDROCHLORIDE 5 MG/1
5 TABLET ORAL EVERY 4 HOURS PRN
Status: DISCONTINUED | OUTPATIENT
Start: 2020-11-03 | End: 2020-11-04 | Stop reason: HOSPADM

## 2020-11-03 RX ORDER — BUPRENORPHINE HYDROCHLORIDE 0.32 MG/ML
INJECTION INTRAMUSCULAR; INTRAVENOUS
Status: COMPLETED | OUTPATIENT
Start: 2020-11-03 | End: 2020-11-03

## 2020-11-03 RX ORDER — ACETAMINOPHEN 500 MG
1000 TABLET ORAL EVERY 6 HOURS
Status: DISCONTINUED | OUTPATIENT
Start: 2020-11-03 | End: 2020-11-04 | Stop reason: HOSPADM

## 2020-11-03 RX ORDER — FAMOTIDINE 10 MG/ML
INJECTION, SOLUTION INTRAVENOUS AS NEEDED
Status: DISCONTINUED | OUTPATIENT
Start: 2020-11-03 | End: 2020-11-03 | Stop reason: SURG

## 2020-11-03 RX ORDER — GLYCOPYRROLATE 0.2 MG/ML
INJECTION INTRAMUSCULAR; INTRAVENOUS AS NEEDED
Status: DISCONTINUED | OUTPATIENT
Start: 2020-11-03 | End: 2020-11-03 | Stop reason: SURG

## 2020-11-03 RX ORDER — BUPROPION HYDROCHLORIDE 100 MG/1
100 TABLET ORAL 3 TIMES DAILY
Status: DISCONTINUED | OUTPATIENT
Start: 2020-11-03 | End: 2020-11-03 | Stop reason: ALTCHOICE

## 2020-11-03 RX ORDER — MONTELUKAST SODIUM 10 MG/1
10 TABLET ORAL NIGHTLY
Status: DISCONTINUED | OUTPATIENT
Start: 2020-11-03 | End: 2020-11-04 | Stop reason: HOSPADM

## 2020-11-03 RX ORDER — BUPROPION HYDROCHLORIDE 100 MG/1
200 TABLET, EXTENDED RELEASE ORAL EVERY 12 HOURS SCHEDULED
Status: CANCELLED | OUTPATIENT
Start: 2020-11-03

## 2020-11-03 RX ORDER — DROPERIDOL 2.5 MG/ML
0.62 INJECTION, SOLUTION INTRAMUSCULAR; INTRAVENOUS ONCE AS NEEDED
Status: DISCONTINUED | OUTPATIENT
Start: 2020-11-03 | End: 2020-11-03 | Stop reason: HOSPADM

## 2020-11-03 RX ORDER — MORPHINE SULFATE 2 MG/ML
2 INJECTION, SOLUTION INTRAMUSCULAR; INTRAVENOUS
Status: DISCONTINUED | OUTPATIENT
Start: 2020-11-03 | End: 2020-11-04 | Stop reason: HOSPADM

## 2020-11-03 RX ORDER — KETOROLAC TROMETHAMINE 30 MG/ML
15 INJECTION, SOLUTION INTRAMUSCULAR; INTRAVENOUS EVERY 6 HOURS
Status: DISCONTINUED | OUTPATIENT
Start: 2020-11-03 | End: 2020-11-04 | Stop reason: HOSPADM

## 2020-11-03 RX ORDER — LEVOTHYROXINE SODIUM 112 UG/1
112 TABLET ORAL DAILY
COMMUNITY
End: 2021-08-06

## 2020-11-03 RX ORDER — OXYBUTYNIN CHLORIDE 5 MG/1
5 TABLET ORAL 2 TIMES DAILY
Status: DISCONTINUED | OUTPATIENT
Start: 2020-11-03 | End: 2020-11-04 | Stop reason: HOSPADM

## 2020-11-03 RX ORDER — ONDANSETRON 2 MG/ML
4 INJECTION INTRAMUSCULAR; INTRAVENOUS EVERY 4 HOURS PRN
Status: DISCONTINUED | OUTPATIENT
Start: 2020-11-03 | End: 2020-11-04 | Stop reason: HOSPADM

## 2020-11-03 RX ORDER — SODIUM CHLORIDE 0.9 % (FLUSH) 0.9 %
10 SYRINGE (ML) INJECTION AS NEEDED
Status: DISCONTINUED | OUTPATIENT
Start: 2020-11-03 | End: 2020-11-03 | Stop reason: HOSPADM

## 2020-11-03 RX ORDER — MEPERIDINE HYDROCHLORIDE 25 MG/ML
12.5 INJECTION INTRAMUSCULAR; INTRAVENOUS; SUBCUTANEOUS
Status: DISCONTINUED | OUTPATIENT
Start: 2020-11-03 | End: 2020-11-03 | Stop reason: HOSPADM

## 2020-11-03 RX ORDER — MIDAZOLAM HYDROCHLORIDE 1 MG/ML
INJECTION INTRAMUSCULAR; INTRAVENOUS AS NEEDED
Status: DISCONTINUED | OUTPATIENT
Start: 2020-11-03 | End: 2020-11-03 | Stop reason: SURG

## 2020-11-03 RX ORDER — IPRATROPIUM BROMIDE AND ALBUTEROL SULFATE 2.5; .5 MG/3ML; MG/3ML
3 SOLUTION RESPIRATORY (INHALATION) ONCE AS NEEDED
Status: DISCONTINUED | OUTPATIENT
Start: 2020-11-03 | End: 2020-11-03 | Stop reason: HOSPADM

## 2020-11-03 RX ORDER — SODIUM CHLORIDE, SODIUM LACTATE, POTASSIUM CHLORIDE, CALCIUM CHLORIDE 600; 310; 30; 20 MG/100ML; MG/100ML; MG/100ML; MG/100ML
100 INJECTION, SOLUTION INTRAVENOUS CONTINUOUS
Status: DISCONTINUED | OUTPATIENT
Start: 2020-11-03 | End: 2020-11-04 | Stop reason: HOSPADM

## 2020-11-03 RX ADMIN — ROPIVACAINE HYDROCHLORIDE 300 MG: 5 INJECTION, SOLUTION EPIDURAL; INFILTRATION; PERINEURAL at 13:16

## 2020-11-03 RX ADMIN — GLYCOPYRROLATE 0.4 MG: 0.2 INJECTION INTRAMUSCULAR; INTRAVENOUS at 14:13

## 2020-11-03 RX ADMIN — KETOROLAC TROMETHAMINE 30 MG: 30 INJECTION, SOLUTION INTRAMUSCULAR at 16:36

## 2020-11-03 RX ADMIN — LIDOCAINE HYDROCHLORIDE 100 MG: 20 INJECTION, SOLUTION EPIDURAL; INFILTRATION; INTRACAUDAL; PERINEURAL at 12:59

## 2020-11-03 RX ADMIN — SODIUM CHLORIDE, POTASSIUM CHLORIDE, SODIUM LACTATE AND CALCIUM CHLORIDE: 600; 310; 30; 20 INJECTION, SOLUTION INTRAVENOUS at 15:59

## 2020-11-03 RX ADMIN — SODIUM CHLORIDE, PRESERVATIVE FREE 3 ML: 5 INJECTION INTRAVENOUS at 21:49

## 2020-11-03 RX ADMIN — FENTANYL CITRATE 50 MCG: 50 INJECTION INTRAMUSCULAR; INTRAVENOUS at 16:40

## 2020-11-03 RX ADMIN — ONDANSETRON 4 MG: 2 INJECTION INTRAMUSCULAR; INTRAVENOUS at 12:56

## 2020-11-03 RX ADMIN — SODIUM CHLORIDE, POTASSIUM CHLORIDE, SODIUM LACTATE AND CALCIUM CHLORIDE: 600; 310; 30; 20 INJECTION, SOLUTION INTRAVENOUS at 12:56

## 2020-11-03 RX ADMIN — ACETAMINOPHEN 1000 MG: 500 TABLET ORAL at 18:08

## 2020-11-03 RX ADMIN — DEXAMETHASONE SODIUM PHOSPHATE 4 MG: 10 INJECTION INTRAMUSCULAR; INTRAVENOUS at 13:11

## 2020-11-03 RX ADMIN — BUPROPION HYDROCHLORIDE 200 MG: 100 TABLET, EXTENDED RELEASE ORAL at 21:45

## 2020-11-03 RX ADMIN — PROPOFOL 150 MG: 10 INJECTION, EMULSION INTRAVENOUS at 12:59

## 2020-11-03 RX ADMIN — ONDANSETRON 4 MG: 2 INJECTION INTRAMUSCULAR; INTRAVENOUS at 18:30

## 2020-11-03 RX ADMIN — ONDANSETRON 4 MG: 2 INJECTION INTRAMUSCULAR; INTRAVENOUS at 22:51

## 2020-11-03 RX ADMIN — MONTELUKAST SODIUM 10 MG: 10 TABLET, COATED ORAL at 21:47

## 2020-11-03 RX ADMIN — DEXAMETHASONE SODIUM PHOSPHATE 8 MG: 4 INJECTION, SOLUTION INTRAMUSCULAR; INTRAVENOUS at 13:16

## 2020-11-03 RX ADMIN — GABAPENTIN 600 MG: 300 CAPSULE ORAL at 21:46

## 2020-11-03 RX ADMIN — FENTANYL CITRATE 100 MCG: 50 INJECTION INTRAMUSCULAR; INTRAVENOUS at 12:56

## 2020-11-03 RX ADMIN — VECURONIUM BROMIDE 6 MG: 1 INJECTION, POWDER, LYOPHILIZED, FOR SOLUTION INTRAVENOUS at 12:59

## 2020-11-03 RX ADMIN — FAMOTIDINE 20 MG: 10 INJECTION INTRAVENOUS at 12:56

## 2020-11-03 RX ADMIN — CEFAZOLIN SODIUM 2 G: 2 SOLUTION INTRAVENOUS at 12:56

## 2020-11-03 RX ADMIN — BUPRENORPHINE HYDROCHLORIDE 0.3 MG: 0.32 INJECTION INTRAMUSCULAR; INTRAVENOUS at 13:16

## 2020-11-03 RX ADMIN — POLYETHYLENE GLYCOL (3350) 17 G: 17 POWDER, FOR SOLUTION ORAL at 21:47

## 2020-11-03 RX ADMIN — CEFAZOLIN SODIUM 2 G: 2 SOLUTION INTRAVENOUS at 21:46

## 2020-11-03 RX ADMIN — OXYBUTYNIN CHLORIDE 5 MG: 5 TABLET ORAL at 21:47

## 2020-11-03 RX ADMIN — MIDAZOLAM HYDROCHLORIDE 2 MG: 1 INJECTION, SOLUTION INTRAMUSCULAR; INTRAVENOUS at 12:56

## 2020-11-03 RX ADMIN — SODIUM CHLORIDE, PRESERVATIVE FREE 10 ML: 5 INJECTION INTRAVENOUS at 21:48

## 2020-11-03 RX ADMIN — DOCUSATE SODIUM 100 MG: 100 CAPSULE ORAL at 21:46

## 2020-11-03 RX ADMIN — FENTANYL CITRATE 50 MCG: 50 INJECTION INTRAMUSCULAR; INTRAVENOUS at 16:35

## 2020-11-03 RX ADMIN — EPHEDRINE SULFATE 20 MG: 50 INJECTION, SOLUTION INTRAVENOUS at 14:07

## 2020-11-03 RX ADMIN — EPHEDRINE SULFATE 20 MG: 50 INJECTION, SOLUTION INTRAVENOUS at 14:00

## 2020-11-03 NOTE — PLAN OF CARE
Goal Outcome Evaluation:  Plan of Care Reviewed With: patient   Outcome Summary: Pt arrived to floor this evening from surgery. No complaints noted at the moment, told pt to tell when in pain and PRN medication could be given. Dressing intact, monitored. Will continue to monitor.

## 2020-11-03 NOTE — ANESTHESIA PROCEDURE NOTES
Airway  Urgency: elective    Date/Time: 11/3/2020 12:56 PM  Airway not difficult    General Information and Staff    Patient location during procedure: OR  Anesthesiologist: Calderon Herrera DO  CRNA: Jomar Castillo CRNA    Indications and Patient Condition  Indications for airway management: airway protection    Preoxygenated: yes  MILS not maintained throughout  Mask difficulty assessment: 0 - not attempted    Final Airway Details  Final airway type: endotracheal airway      Successful airway: ETT  Cuffed: yes   Successful intubation technique: direct laryngoscopy  Endotracheal tube insertion site: oral  Blade: Fuchs  Blade size: 2  ETT size (mm): 7.5  Cormack-Lehane Classification: grade I - full view of glottis  Placement verified by: chest auscultation and capnometry   Measured from: lips  ETT/EBT  to lips (cm): 22  Number of attempts at approach: 1  Assessment: lips, teeth, and gum same as pre-op and atraumatic intubation    Additional Comments  Atraumatic ETT placement, dentition unchanged.             Unemployed

## 2020-11-03 NOTE — ANESTHESIA POSTPROCEDURE EVALUATION
Patient: Karla Connell    Procedure Summary     Date: 11/03/20 Room / Location:  [facility] OR 01 /  [facility] OR    Anesthesia Start: 1256 Anesthesia Stop: 1602    Procedure: PANNICULECTOMY (N/A Abdomen) Diagnosis:       Abdominal pannus      (Abdominal pannus [E65])    Surgeon: Queenie Gray MD Provider: Calderon Herrera DO    Anesthesia Type: general, general with block ASA Status: 2          Anesthesia Type: general, general with block    Vitals  Vitals Value Taken Time   /96 11/03/20 1623   Temp 97.2 °F (36.2 °C) 11/03/20 1603   Pulse 65 11/03/20 1623   Resp 13 11/03/20 1623   SpO2 100 % 11/03/20 1623           Post Anesthesia Care and Evaluation    Patient location during evaluation: PACU  Patient participation: complete - patient participated  Level of consciousness: awake and alert  Pain score: 1  Pain management: adequate  Airway patency: patent  Anesthetic complications: No anesthetic complications  PONV Status: controlled  Cardiovascular status: acceptable  Respiratory status: acceptable and nasal cannula  Hydration status: euvolemic  No anesthesia care post op

## 2020-11-03 NOTE — OP NOTE
Panniculectomy    Surgeon:  Queenie Gray M.D., SHERRY    Assistant;  Porfirio Valerio    Pre-op:  Symptomatic abdominal pannus    Post-op:  Symptomatic abdominal pannus    Anesthesia:  general    Indications: symptomatic abdominal pannus       Procedure Details   After obtaining informed consent and receiving preoperative antibiotics and with venous compression boots in place, and after markings were placed in the pre-op area,  the patient was taken to the operating room and placed under anesthesia.  Velázquez catheter was placed. The abdomen was prepped and draped in a sterile fashion.  An elliptical incision oriented in the vertical plane was made around the umbilicus and carried down to the fascia.  A large transverse incision was then made from lateral to the anterior superior iliac spine to the other side.  The Harmonic scalpel was then used to dissect along the fascia from the pubis to the xiphoid.  The lap band port was carefully dissected free from the surrounding tissue. The bed was angled 15 degrees and the appropriate level of skin transection was determined.  The excess skin and subQ was transected.  An incision was then made for the umbilicus in the midline.  Multiple 1 Vicryl sutures were used to close the dead space by tacking the fascia to Alireza's fascia.  The incision was then closed over 2 RALF drains with 1 Vicryl to Alireza's fascia, 0 Vicryl in the subQ, and 3-0 Monocryl for the skin.  The umbilicus was closed with 3-0 Vicryl subdermal sutures and a 4-0 Monocryl.  RALF's were sewn in with 2-0 silk.  Dressing and abdominal binder were placed.  Patient tolerated the procedure well and was taken to the recovery room in stable condition.    Findings:  8.2 lbs skin & subq removed. Skin lesion on pannus excised and sent for tissue eval    Estimated Blood Loss:  100 mL    Blood administered:  none           Drains: RALF x 2           Specimens:   ID Type Source Tests Collected by Time   A (Not marked as sent) :  skin lesion removal-stitch at 12 o clock Tissue Abdomen, Lower TISSUE PATHOLOGY EXAM Queenie Gray MD 11/3/2020 1326        Grafts and Implants: No       Complications:  none           Disposition: PACU - hemodynamically stable.           Condition: stable

## 2020-11-03 NOTE — ANESTHESIA PROCEDURE NOTES
"Peripheral Block      Patient location during procedure: OR  Start time: 11/3/2020 1:00 PM  Stop time: 11/3/2020 1:04 PM  Reason for block: at surgeon's request and post-op pain management  Performed by  CRNA: Jomar Castillo CRNA  Preanesthetic Checklist  Completed: patient identified, site marked, surgical consent, pre-op evaluation, timeout performed, IV checked, risks and benefits discussed and monitors and equipment checked  Prep:  Pt Position: supine  Sterile barriers:cap, gloves, sterile barriers and mask  Prep: ChloraPrep  Patient monitoring: blood pressure monitoring, continuous pulse oximetry and EKG  Procedure  Nursing cardiac assessment comments yes: Sedation, GA, Spinal,Epidural   Performed under: general  Guidance:ultrasound guided  ULTRASOUND INTERPRETATION. Using ultrasound guidance a 20 G (20g 4\" Stimuplex) gauge needle was placed in close proximity to the nerve, at which point, under ultrasound guidance anesthetic was injected in the area of the nerve and spread of the anesthesia was seen on ultrasound in close proximity thereto.  There were no abnormalities seen on ultrasound; a digital image was taken; and the patient tolerated the procedure with no complications. Images:still images obtained    Laterality:Bilateral  Block Type:TAP  Injection Technique:single-shot  Needle Type:short-bevel  Needle Gauge:20 G  Resistance on Injection: none    Medications Used: buprenorphine (BUPRENEX) injection, 0.3 mg  dexamethasone (DECADRON) injection, 8 mg  ropivacaine (NAROPIN) injection 0.5 %, 300 mg      Medications  Comment:Block Injection:  Total volume divided equally between Right and Left block        Post Assessment  Injection Assessment: negative aspiration for heme, incremental injection and no paresthesia on injection  Patient Tolerance:comfortable throughout block  Complications:no  Additional Notes  The pt was in the supine position under general anesthesia.    Under Ultrasound guidance, a " BBraun 4inch 360 degree needle was advanced with Normal Saline hydro dissection of tissue.  The Internal Oblique and Transversus Abdominus muscles where visualized.  At or before the aponeurosis of Internal Oblique, local anesthetic spread was visualized in the Transversus Abdominus Plane. Injection was made incrementally with aspiration every 5 mls.  There was no  intravascular injection,  injection pressure was normal, there was no neural injection, and the procedure was completed without difficulty. The same procedure was completed for left and right sided tap blocks. Thank You.

## 2020-11-04 VITALS
HEART RATE: 62 BPM | TEMPERATURE: 97.6 F | OXYGEN SATURATION: 98 % | WEIGHT: 191.4 LBS | SYSTOLIC BLOOD PRESSURE: 105 MMHG | BODY MASS INDEX: 30.76 KG/M2 | RESPIRATION RATE: 20 BRPM | DIASTOLIC BLOOD PRESSURE: 64 MMHG | HEIGHT: 66 IN

## 2020-11-04 PROCEDURE — 25010000002 MORPHINE PER 10 MG: Performed by: SURGERY

## 2020-11-04 PROCEDURE — 25010000002 ONDANSETRON PER 1 MG: Performed by: SURGERY

## 2020-11-04 PROCEDURE — 25010000003 CEFAZOLIN SODIUM-DEXTROSE 2-3 GM-%(50ML) RECONSTITUTED SOLUTION: Performed by: SURGERY

## 2020-11-04 PROCEDURE — G0378 HOSPITAL OBSERVATION PER HR: HCPCS

## 2020-11-04 PROCEDURE — 99024 POSTOP FOLLOW-UP VISIT: CPT | Performed by: SURGERY

## 2020-11-04 PROCEDURE — 25010000002 KETOROLAC TROMETHAMINE PER 15 MG: Performed by: SURGERY

## 2020-11-04 RX ORDER — POLYETHYLENE GLYCOL 3350 17 G/17G
17 POWDER, FOR SOLUTION ORAL 2 TIMES DAILY
Qty: 10 PACKET | Refills: 0 | Status: SHIPPED | OUTPATIENT
Start: 2020-11-04 | End: 2020-11-09

## 2020-11-04 RX ORDER — CEFDINIR 300 MG/1
300 CAPSULE ORAL 2 TIMES DAILY
Qty: 14 CAPSULE | Refills: 0 | Status: SHIPPED | OUTPATIENT
Start: 2020-11-04 | End: 2020-11-11

## 2020-11-04 RX ORDER — OXYCODONE AND ACETAMINOPHEN 10; 325 MG/1; MG/1
1 TABLET ORAL EVERY 6 HOURS PRN
Qty: 25 TABLET | Refills: 0 | Status: SHIPPED | OUTPATIENT
Start: 2020-11-04 | End: 2021-08-06

## 2020-11-04 RX ADMIN — PANTOPRAZOLE SODIUM 40 MG: 40 TABLET, DELAYED RELEASE ORAL at 06:42

## 2020-11-04 RX ADMIN — MORPHINE SULFATE 2 MG: 2 INJECTION, SOLUTION INTRAMUSCULAR; INTRAVENOUS at 09:18

## 2020-11-04 RX ADMIN — CEFAZOLIN SODIUM 2 G: 2 SOLUTION INTRAVENOUS at 06:33

## 2020-11-04 RX ADMIN — DOCUSATE SODIUM 100 MG: 100 CAPSULE ORAL at 08:35

## 2020-11-04 RX ADMIN — ACETAMINOPHEN 1000 MG: 500 TABLET ORAL at 06:41

## 2020-11-04 RX ADMIN — ONDANSETRON 4 MG: 2 INJECTION INTRAMUSCULAR; INTRAVENOUS at 07:39

## 2020-11-04 RX ADMIN — OXYCODONE HYDROCHLORIDE 5 MG: 5 TABLET ORAL at 13:24

## 2020-11-04 RX ADMIN — KETOROLAC TROMETHAMINE 15 MG: 30 INJECTION, SOLUTION INTRAMUSCULAR; INTRAVENOUS at 12:10

## 2020-11-04 RX ADMIN — SODIUM CHLORIDE, PRESERVATIVE FREE 3 ML: 5 INJECTION INTRAVENOUS at 09:18

## 2020-11-04 RX ADMIN — POLYETHYLENE GLYCOL (3350) 17 G: 17 POWDER, FOR SOLUTION ORAL at 08:35

## 2020-11-04 RX ADMIN — KETOROLAC TROMETHAMINE 15 MG: 30 INJECTION, SOLUTION INTRAMUSCULAR; INTRAVENOUS at 00:34

## 2020-11-04 RX ADMIN — LEVOTHYROXINE SODIUM 112 MCG: 75 TABLET ORAL at 06:42

## 2020-11-04 RX ADMIN — BUPROPION HYDROCHLORIDE 200 MG: 100 TABLET, EXTENDED RELEASE ORAL at 08:35

## 2020-11-04 RX ADMIN — OXYBUTYNIN CHLORIDE 5 MG: 5 TABLET ORAL at 08:35

## 2020-11-04 RX ADMIN — ACETAMINOPHEN 1000 MG: 500 TABLET ORAL at 00:34

## 2020-11-04 NOTE — NURSING NOTE
Patient educated on RALF drain care and how to keep up with output. She states understanding and states she is comfortable doing this at home.

## 2020-11-04 NOTE — DISCHARGE SUMMARY
[facility] GENERAL SURGERY DISCHARGE SUMMARY < 48 HOURS    Patient Identification:  Name:  Karla Connell  Age:  49 y.o.  Sex:  female  :  1970  MRN:  [id number]    Date of Admission: 11/3/2020  Date of Discharge:  2020     ADMISSION DIAGNOSIS: Abdominal pannus    DISCHARGE DIAGNOSIS:  Same    PROCEDURES PERFORMED:  Procedure(s):  PANNICULECTOMY       HOSPITAL COURSE  Patient is a 49 y.o. female admitted to [facility] postop care after surgery.  Please see the admitting history and physical for further details.  Patient did well.  She is ambulating and voiding.  She did vomit this morning which she attributed to the Toradol.  Since then she has taken the oxycodone and has done well with it.  On examination all skin is viable.  RALF drains draining serosanguineous fluid.    CONDITION AT DISCHARGE:  Improved    DISCHARGE DISPOSITION   Home    DISCHARGE MEDICATIONS:     Discharge Medications      New Medications      Instructions Start Date   cefdinir 300 MG capsule  Commonly known as: OMNICEF   300 mg, Oral, 2 Times Daily      oxyCODONE-acetaminophen  MG per tablet  Commonly known as: Percocet   1 tablet, Oral, Every 6 Hours PRN      polyethylene glycol 17 g packet  Commonly known as: MIRALAX   Mix 17 g in 8 ounces of liquid and take by mouth 2 (Two) Times a Day for 5 days.         Continue These Medications      Instructions Start Date   estrogen (conjugated)-medroxyprogesterone 0.3-1.5 MG per tablet  Commonly known as: PREMPRO   1 tablet, Oral, Daily      gabapentin 600 MG tablet  Commonly known as: NEURONTIN   600 mg, Oral, 3 Times Daily      levothyroxine 112 MCG tablet  Commonly known as: SYNTHROID, LEVOTHROID   112 mcg, Oral, Daily      meloxicam 15 MG tablet  Commonly known as: MOBIC   15 mg, Oral, Daily      montelukast 10 MG tablet  Commonly known as: SINGULAIR   10 mg, Oral, Nightly      oxybutynin 5 MG tablet  Commonly known as: DITROPAN   5 mg, Oral, 2 Times Daily       SUMAtriptan 25 MG tablet  Commonly known as: IMITREX   25 mg, Oral, Every 2 Hours PRN, Take one tablet at onset of headache. May repeat dose one time in 2 hours if headache not relieved.       Wellbutrin  MG 12 hr tablet  Generic drug: buPROPion SR   200 mg, Oral, 2 Times Daily             FOLLOW-UP:  Dr. Gray in 8 days    Activity and diet instructions given to the patient

## 2020-11-04 NOTE — NURSING NOTE
Notified Dr. Gray that patient stated the oxycodone 5 mg will control her pain once she goes home. Dr. Gray stated she will work on her discharge order.

## 2020-11-04 NOTE — PLAN OF CARE
Goal Outcome Evaluation:  Plan of Care Reviewed With: patient  Progress: no change  Outcome Summary: patient resting well, no changes, will continue to monitor

## 2020-11-05 ENCOUNTER — TELEPHONE (OUTPATIENT)
Dept: SURGERY | Facility: CLINIC | Age: 50
End: 2020-11-05

## 2020-11-09 LAB
LAB AP CASE REPORT: NORMAL
PATH REPORT.FINAL DX SPEC: NORMAL

## 2020-11-12 ENCOUNTER — OFFICE VISIT (OUTPATIENT)
Dept: SURGERY | Facility: CLINIC | Age: 50
End: 2020-11-12

## 2020-11-12 VITALS
HEART RATE: 76 BPM | BODY MASS INDEX: 29.89 KG/M2 | DIASTOLIC BLOOD PRESSURE: 85 MMHG | WEIGHT: 186 LBS | SYSTOLIC BLOOD PRESSURE: 122 MMHG | HEIGHT: 66 IN

## 2020-11-12 DIAGNOSIS — E65 ABDOMINAL PANNUS: Primary | ICD-10-CM

## 2020-11-12 DIAGNOSIS — Z09 POSTOP CHECK: ICD-10-CM

## 2020-11-12 PROCEDURE — 99024 POSTOP FOLLOW-UP VISIT: CPT | Performed by: SURGERY

## 2020-11-12 RX ORDER — DOXYCYCLINE HYCLATE 100 MG/1
100 CAPSULE ORAL 2 TIMES DAILY
Qty: 14 CAPSULE | Refills: 0 | Status: SHIPPED | OUTPATIENT
Start: 2020-11-12 | End: 2020-11-19

## 2020-11-12 NOTE — PROGRESS NOTES
"Subjective   Karla Connell is a 50 y.o. female here today for post op.    History of Present Illness  Ms. Connell was seen in the office today for her first postoperative visit following panniculectomy.  She has had some issues with nausea.  Bowels are working.  She has been wearing her binder.    No Known Allergies      Current Outpatient Medications   Medication Sig Dispense Refill   • buPROPion SR (Wellbutrin SR) 200 MG 12 hr tablet Take 200 mg by mouth 2 (Two) Times a Day.     • estrogen, conjugated,-medroxyprogesterone (PREMPRO) 0.3-1.5 MG per tablet Take 1 tablet by mouth Daily.     • gabapentin (NEURONTIN) 600 MG tablet Take 600 mg by mouth 3 (Three) Times a Day.     • levothyroxine (SYNTHROID, LEVOTHROID) 112 MCG tablet Take 112 mcg by mouth Daily.     • meloxicam (MOBIC) 15 MG tablet Take 15 mg by mouth Daily.     • montelukast (SINGULAIR) 10 MG tablet Take 10 mg by mouth Every Night.     • oxybutynin (DITROPAN) 5 MG tablet Take 5 mg by mouth 2 (Two) Times a Day.     • oxyCODONE-acetaminophen (Percocet)  MG per tablet Take 1 tablet by mouth Every 6 (Six) Hours As Needed for Moderate Pain  for up to 25 doses. 25 tablet 0   • SUMAtriptan (IMITREX) 25 MG tablet Take 25 mg by mouth Every 2 (Two) Hours As Needed for Migraine. Take one tablet at onset of headache. May repeat dose one time in 2 hours if headache not relieved.       No current facility-administered medications for this visit.        Objective   Ht 167.6 cm (66\")   Wt 84.4 kg (186 lb)   BMI 30.02 kg/m²    Physical Exam  On examination this is a well-developed well-nourished female in no acute distress  HEENT examination: Within normal limits.  Conjunctiva pink.  Nose and ears appear normal.  Neck: Supple, full range of motion.  No JVD.  Musculoskeletal: Full range of motion all extremities without focal weakness. Normal gait. No digital cyanosis.  Psych: Patient is alert, oriented x3. Mood and affect are appropriate.  Skin: All skin is " viable.  Drain output was greater than 30 cc drain was left in.  On examination of the abdominal wall there is erythema.  Was not clear if this represented edema, cellulitis or even possibly some ischemia.  Results/Data      Procedures     Assessment/Plan   Status post panniculectomy    Follow-up in 1 week  Continue antibiotics       Discussion/Summary  Patient's Body mass index is 30.02 kg/m². BMI is above normal parameters. Recommendations include: educational material.    No future appointments.      Please note that portions of this note were completed with a voice recognition program.

## 2020-11-19 ENCOUNTER — OFFICE VISIT (OUTPATIENT)
Dept: SURGERY | Facility: CLINIC | Age: 50
End: 2020-11-19

## 2020-11-19 VITALS
WEIGHT: 186.6 LBS | HEART RATE: 68 BPM | DIASTOLIC BLOOD PRESSURE: 87 MMHG | BODY MASS INDEX: 29.99 KG/M2 | SYSTOLIC BLOOD PRESSURE: 149 MMHG | HEIGHT: 66 IN

## 2020-11-19 DIAGNOSIS — Z09 POSTOP CHECK: ICD-10-CM

## 2020-11-19 DIAGNOSIS — E65 ABDOMINAL PANNUS: Primary | ICD-10-CM

## 2020-11-19 PROCEDURE — 99024 POSTOP FOLLOW-UP VISIT: CPT | Performed by: SURGERY

## 2020-11-19 NOTE — PROGRESS NOTES
"Subjective   Karla Connell is a 50 y.o. female here today for post op.    History of Present Illness  Ms. Connell was seen in the office today for her second postoperative visit following panniculectomy.  She is doing better.  Drain outputs are less.  Bowels are working.    No Known Allergies      Current Outpatient Medications   Medication Sig Dispense Refill   • buPROPion SR (Wellbutrin SR) 200 MG 12 hr tablet Take 200 mg by mouth 2 (Two) Times a Day.     • doxycycline (VIBRAMYCIN) 100 MG capsule Take 1 capsule by mouth 2 (Two) Times a Day for 7 days. 14 capsule 0   • estrogen, conjugated,-medroxyprogesterone (PREMPRO) 0.3-1.5 MG per tablet Take 1 tablet by mouth Daily.     • gabapentin (NEURONTIN) 600 MG tablet Take 600 mg by mouth 3 (Three) Times a Day.     • levothyroxine (SYNTHROID, LEVOTHROID) 112 MCG tablet Take 112 mcg by mouth Daily.     • meloxicam (MOBIC) 15 MG tablet Take 15 mg by mouth Daily.     • montelukast (SINGULAIR) 10 MG tablet Take 10 mg by mouth Every Night.     • oxybutynin (DITROPAN) 5 MG tablet Take 5 mg by mouth 2 (Two) Times a Day.     • oxyCODONE-acetaminophen (Percocet)  MG per tablet Take 1 tablet by mouth Every 6 (Six) Hours As Needed for Moderate Pain  for up to 25 doses. 25 tablet 0   • SUMAtriptan (IMITREX) 25 MG tablet Take 25 mg by mouth Every 2 (Two) Hours As Needed for Migraine. Take one tablet at onset of headache. May repeat dose one time in 2 hours if headache not relieved.       No current facility-administered medications for this visit.        Objective   Ht 167.6 cm (66\")   Wt 84.6 kg (186 lb 9.6 oz)   BMI 30.12 kg/m²    Physical Exam  Examination of the abdomen demonstrates healing surgical scars.  There was a fairly large area on the lower abdomen that has been concerning for some early skin ischemia.  This is improved and while there is still a slight pigment change, clearly all skin is viable.  After review of RALF output the RALF drain was removed.  The other " drain had fallen out earlier today.    Results/Data      Procedures     Assessment/Plan   Stable course, status post panniculectomy    Level of activity discussed  Follow-up for a final wound check in 1 month       Discussion/Summary  Patient's Body mass index is 30.12 kg/m². BMI is above normal parameters. Recommendations include: educational material.    No future appointments.      Please note that portions of this note were completed with a voice recognition program.

## 2020-12-01 ENCOUNTER — OFFICE VISIT (OUTPATIENT)
Dept: SURGERY | Facility: CLINIC | Age: 50
End: 2020-12-01

## 2020-12-01 VITALS
HEART RATE: 61 BPM | WEIGHT: 186 LBS | BODY MASS INDEX: 29.89 KG/M2 | SYSTOLIC BLOOD PRESSURE: 119 MMHG | HEIGHT: 66 IN | DIASTOLIC BLOOD PRESSURE: 92 MMHG

## 2020-12-01 DIAGNOSIS — Z51.89 VISIT FOR WOUND CHECK: ICD-10-CM

## 2020-12-01 DIAGNOSIS — E65 ABDOMINAL PANNUS: Primary | ICD-10-CM

## 2020-12-01 PROCEDURE — 99024 POSTOP FOLLOW-UP VISIT: CPT | Performed by: SURGERY

## 2020-12-01 NOTE — PROGRESS NOTES
"Subjective   Karla Connell is a 50 y.o. female here today for possible infection.    History of Present Illness  Karla was seen in the office today ahead of scheduled follow up for wound check.  She states she has had some persistent drainage from where her drain sites were and perhaps a little bit more swelling after having worked 4 12-hour shifts last week.  No fever    No Known Allergies      Current Outpatient Medications   Medication Sig Dispense Refill   • buPROPion SR (Wellbutrin SR) 200 MG 12 hr tablet Take 200 mg by mouth 2 (Two) Times a Day.     • estrogen, conjugated,-medroxyprogesterone (PREMPRO) 0.3-1.5 MG per tablet Take 1 tablet by mouth Daily.     • gabapentin (NEURONTIN) 600 MG tablet Take 600 mg by mouth 3 (Three) Times a Day.     • levothyroxine (SYNTHROID, LEVOTHROID) 112 MCG tablet Take 112 mcg by mouth Daily.     • meloxicam (MOBIC) 15 MG tablet Take 15 mg by mouth Daily.     • montelukast (SINGULAIR) 10 MG tablet Take 10 mg by mouth Every Night.     • oxybutynin (DITROPAN) 5 MG tablet Take 5 mg by mouth 2 (Two) Times a Day.     • oxyCODONE-acetaminophen (Percocet)  MG per tablet Take 1 tablet by mouth Every 6 (Six) Hours As Needed for Moderate Pain  for up to 25 doses. 25 tablet 0   • SUMAtriptan (IMITREX) 25 MG tablet Take 25 mg by mouth Every 2 (Two) Hours As Needed for Migraine. Take one tablet at onset of headache. May repeat dose one time in 2 hours if headache not relieved.       No current facility-administered medications for this visit.        Objective   /92 (BP Location: Left arm)   Pulse 61   Ht 167.6 cm (66\")   Wt 84.4 kg (186 lb)   BMI 30.02 kg/m²    Physical Exam  On examination of the abdominal wall all skin is viable.  There is actually improved over prior.  Very small opening at the drain site which when probed with a Q-tip does not track deeper.  Results/Data      Procedures     Assessment/Plan   Status post panniculectomy.    Expected drainage from prior " drain site  Continue present care and topical treatment       Discussion/Summary  Patient's Body mass index is 30.02 kg/m². BMI is above normal parameters. Recommendations include: educational material.    Future Appointments   Date Time Provider Department Center   12/1/2020  1:15 PM Queenie Gray MD [facility] None   12/21/2020  4:00 PM Queenie Gray MD [facility] None         Please note that portions of this note were completed with a voice recognition program.

## 2020-12-16 ENCOUNTER — IMMUNIZATION (OUTPATIENT)
Dept: VACCINE CLINIC | Facility: HOSPITAL | Age: 50
End: 2020-12-16

## 2020-12-16 PROCEDURE — 0001A: CPT | Performed by: FAMILY MEDICINE

## 2020-12-16 PROCEDURE — 91300 HC SARSCOV02 VAC 30MCG/0.3ML IM: CPT | Performed by: FAMILY MEDICINE

## 2020-12-21 ENCOUNTER — OFFICE VISIT (OUTPATIENT)
Dept: SURGERY | Facility: CLINIC | Age: 50
End: 2020-12-21

## 2020-12-21 VITALS
BODY MASS INDEX: 29.89 KG/M2 | HEIGHT: 66 IN | HEART RATE: 68 BPM | SYSTOLIC BLOOD PRESSURE: 119 MMHG | WEIGHT: 186 LBS | DIASTOLIC BLOOD PRESSURE: 92 MMHG

## 2020-12-21 DIAGNOSIS — E65 ABDOMINAL PANNUS: Primary | ICD-10-CM

## 2020-12-21 DIAGNOSIS — Z51.89 VISIT FOR WOUND CHECK: ICD-10-CM

## 2020-12-21 PROCEDURE — 99024 POSTOP FOLLOW-UP VISIT: CPT | Performed by: SURGERY

## 2020-12-21 NOTE — PROGRESS NOTES
Subjective   Karla Connell is a 50 y.o. female here today for follow-up    History of Present Illness  Ms. Connell presents to the office today for a 6-week follow-up status post panniculectomy.  She states that her drain sites have just now stopped draining.  She voices no ongoing concerns.    No Known Allergies      Current Outpatient Medications   Medication Sig Dispense Refill   • buPROPion SR (Wellbutrin SR) 200 MG 12 hr tablet Take 200 mg by mouth 2 (Two) Times a Day.     • buPROPion SR (WELLBUTRIN SR) 200 MG 12 hr tablet Take 1 tablet by mouth 2 (two) times a day. 60 tablet 5   • estrogen, conjugated,-medroxyprogesterone (PREMPRO) 0.3-1.5 MG per tablet Take 1 tablet by mouth Daily.     • estrogen, conjugated,-medroxyprogesterone (Prempro) 0.3-1.5 MG per tablet Take 1 tablet by mouth Daily. 28 tablet 9   • gabapentin (NEURONTIN) 600 MG tablet Take 600 mg by mouth 3 (Three) Times a Day.     • gabapentin (NEURONTIN) 600 MG tablet Take 1 tablet by mouth 3 (Three) Times a Day for back pain 90 tablet 1   • levothyroxine (SYNTHROID, LEVOTHROID) 112 MCG tablet Take 112 mcg by mouth Daily.     • levothyroxine (SYNTHROID, LEVOTHROID) 112 MCG tablet Take 1 tablet by mouth Daily. 30 tablet 5   • meloxicam (MOBIC) 15 MG tablet Take 15 mg by mouth Daily.     • meloxicam (MOBIC) 15 MG tablet Take 1 tablet by mouth Daily As Needed for pain 30 tablet 5   • montelukast (SINGULAIR) 10 MG tablet Take 10 mg by mouth Every Night.     • montelukast (SINGULAIR) 10 MG tablet Take 1 tablet by mouth Every Evening. 30 tablet 5   • oxybutynin (DITROPAN) 5 MG tablet Take 5 mg by mouth 2 (Two) Times a Day.     • oxybutynin (DITROPAN) 5 MG tablet Take 1 tablet by mouth 2 (two) times a day. 60 tablet 5   • oxyCODONE-acetaminophen (Percocet)  MG per tablet Take 1 tablet by mouth Every 6 (Six) Hours As Needed for Moderate Pain  for up to 25 doses. 25 tablet 0   • SUMAtriptan (IMITREX) 25 MG tablet Take 25 mg by mouth Every 2 (Two) Hours  "As Needed for Migraine. Take one tablet at onset of headache. May repeat dose one time in 2 hours if headache not relieved.     • SUMAtriptan (IMITREX) 25 MG tablet Take 1 tablet by mouth As Needed for migraine, may repeat every 2 hours as needed 9 tablet 5     No current facility-administered medications for this visit.        Objective   /92   Pulse 68   Ht 167.6 cm (65.98\")   Wt 84.4 kg (186 lb)   BMI 30.04 kg/m²    Physical Exam  On examination this is a well-developed well-nourished female in no acute distress  HEENT examination: Within normal limits.  Conjunctiva pink.  Nose and ears appear normal.  Neck: Supple, full range of motion.  No JVD.  Musculoskeletal: Full range of motion all extremities without focal weakness. Normal gait. No digital cyanosis.  Psych: Patient is alert, oriented x3. Mood and affect are appropriate.  Skin: All skin is viable.  There is a slight discoloration of an area superior to the incision on the right side which may represent very minimal ischemia.  There is no cellulitis.  Results/Data      Procedures     Assessment/Plan   Stable course, status post panniculectomy    Follow-up as needed  Level of activity discussed       Discussion/Summary    Future Appointments   Date Time Provider Department Center   1/6/2021  1:00 PM [id number]         Please note that portions of this note were completed with a voice recognition program.  "

## 2021-01-06 ENCOUNTER — IMMUNIZATION (OUTPATIENT)
Dept: VACCINE CLINIC | Facility: HOSPITAL | Age: 51
End: 2021-01-06

## 2021-01-06 PROCEDURE — 91300 HC SARSCOV02 VAC 30MCG/0.3ML IM: CPT | Performed by: FAMILY MEDICINE

## 2021-01-06 PROCEDURE — 0002A: CPT | Performed by: FAMILY MEDICINE

## 2021-01-06 PROCEDURE — 0001A: CPT | Performed by: FAMILY MEDICINE

## 2021-01-23 ENCOUNTER — HOSPITAL ENCOUNTER (OUTPATIENT)
Dept: GENERAL RADIOLOGY | Facility: HOSPITAL | Age: 51
Discharge: HOME | End: 2021-01-23
Admitting: PHYSICIAN ASSISTANT

## 2021-01-23 ENCOUNTER — TRANSCRIBE ORDERS (OUTPATIENT)
Dept: ADMINISTRATIVE | Facility: HOSPITAL | Age: 51
End: 2021-01-23

## 2021-01-23 DIAGNOSIS — M54.50 LOW BACK PAIN, UNSPECIFIED BACK PAIN LATERALITY, UNSPECIFIED CHRONICITY, UNSPECIFIED WHETHER SCIATICA PRESENT: Primary | ICD-10-CM

## 2021-01-23 PROCEDURE — 72110 X-RAY EXAM L-2 SPINE 4/>VWS: CPT

## 2021-01-23 PROCEDURE — 72110 X-RAY EXAM L-2 SPINE 4/>VWS: CPT | Performed by: RADIOLOGY

## 2021-03-24 ENCOUNTER — HOSPITAL ENCOUNTER (OUTPATIENT)
Dept: MAMMOGRAPHY | Facility: HOSPITAL | Age: 51
Discharge: HOME | End: 2021-03-24
Admitting: PHYSICIAN ASSISTANT

## 2021-03-24 DIAGNOSIS — Z12.31 VISIT FOR SCREENING MAMMOGRAM: ICD-10-CM

## 2021-03-24 PROCEDURE — 77067 SCR MAMMO BI INCL CAD: CPT | Performed by: RADIOLOGY

## 2021-03-24 PROCEDURE — 77063 BREAST TOMOSYNTHESIS BI: CPT | Performed by: RADIOLOGY

## 2021-03-24 PROCEDURE — 77067 SCR MAMMO BI INCL CAD: CPT

## 2021-03-24 PROCEDURE — 77063 BREAST TOMOSYNTHESIS BI: CPT

## 2021-06-23 ENCOUNTER — TRANSCRIBE ORDERS (OUTPATIENT)
Dept: ADMINISTRATIVE | Facility: HOSPITAL | Age: 51
End: 2021-06-23

## 2021-06-23 DIAGNOSIS — M54.50 LOW BACK PAIN, UNSPECIFIED BACK PAIN LATERALITY, UNSPECIFIED CHRONICITY, UNSPECIFIED WHETHER SCIATICA PRESENT: Primary | ICD-10-CM

## 2021-06-29 ENCOUNTER — HOSPITAL ENCOUNTER (EMERGENCY)
Facility: HOSPITAL | Age: 51
Discharge: HOME | End: 2021-06-29
Attending: GENERAL ACUTE CARE HOSPITAL | Admitting: GENERAL ACUTE CARE HOSPITAL

## 2021-06-29 ENCOUNTER — APPOINTMENT (OUTPATIENT)
Dept: GENERAL RADIOLOGY | Facility: HOSPITAL | Age: 51
End: 2021-06-29

## 2021-06-29 VITALS
HEIGHT: 66 IN | TEMPERATURE: 97.5 F | SYSTOLIC BLOOD PRESSURE: 155 MMHG | WEIGHT: 192 LBS | DIASTOLIC BLOOD PRESSURE: 105 MMHG | BODY MASS INDEX: 30.86 KG/M2 | RESPIRATION RATE: 18 BRPM | OXYGEN SATURATION: 98 % | HEART RATE: 67 BPM

## 2021-06-29 DIAGNOSIS — M19.031 ARTHRITIS OF RIGHT WRIST: ICD-10-CM
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
## 2021-06-29 DIAGNOSIS — S93.402A MODERATE ANKLE SPRAIN, LEFT, INITIAL ENCOUNTER: Primary | ICD-10-CM

## 2021-06-29 PROCEDURE — 73110 X-RAY EXAM OF WRIST: CPT | Performed by: RADIOLOGY

## 2021-06-29 PROCEDURE — 73610 X-RAY EXAM OF ANKLE: CPT

## 2021-06-29 PROCEDURE — 73110 X-RAY EXAM OF WRIST: CPT

## 2021-06-29 PROCEDURE — 99283 EMERGENCY DEPT VISIT LOW MDM: CPT

## 2021-06-29 PROCEDURE — 73610 X-RAY EXAM OF ANKLE: CPT | Performed by: RADIOLOGY

## 2021-06-29 RX ORDER — INDOMETHACIN 25 MG/1
25 CAPSULE ORAL 2 TIMES DAILY WITH MEALS
Qty: 20 CAPSULE | Refills: 0 | Status: SHIPPED | OUTPATIENT
Start: 2021-06-29 | End: 2021-08-06

## 2021-07-01 ENCOUNTER — HOSPITAL ENCOUNTER (OUTPATIENT)
Dept: MRI IMAGING | Facility: HOSPITAL | Age: 51
Discharge: HOME | End: 2021-07-01
Admitting: PHYSICIAN ASSISTANT

## 2021-07-01 DIAGNOSIS — M54.50 LOW BACK PAIN, UNSPECIFIED BACK PAIN LATERALITY, UNSPECIFIED CHRONICITY, UNSPECIFIED WHETHER SCIATICA PRESENT: ICD-10-CM

## 2021-07-01 PROCEDURE — 72148 MRI LUMBAR SPINE W/O DYE: CPT | Performed by: RADIOLOGY

## 2021-07-01 PROCEDURE — 72148 MRI LUMBAR SPINE W/O DYE: CPT

## 2021-08-06 ENCOUNTER — OFFICE VISIT (OUTPATIENT)
Dept: NEUROSURGERY | Facility: CLINIC | Age: 51
End: 2021-08-06

## 2021-08-06 VITALS — HEIGHT: 66 IN | WEIGHT: 194.8 LBS | TEMPERATURE: 97.1 F | BODY MASS INDEX: 31.31 KG/M2

## 2021-08-06 DIAGNOSIS — M54.16 LUMBAR RADICULOPATHY: ICD-10-CM

## 2021-08-06 DIAGNOSIS — M54.9 MECHANICAL BACK PAIN: Primary | ICD-10-CM

## 2021-08-06 DIAGNOSIS — M51.36 DDD (DEGENERATIVE DISC DISEASE), LUMBAR: ICD-10-CM

## 2021-08-06 DIAGNOSIS — M43.16 SPONDYLOLISTHESIS OF LUMBAR REGION: ICD-10-CM

## 2021-08-06 PROCEDURE — 99203 OFFICE O/P NEW LOW 30 MIN: CPT | Performed by: NEUROLOGICAL SURGERY

## 2021-08-06 NOTE — PROGRESS NOTES
Patient: Karla Connell  : 1970    Primary Care Provider: Whit Cortez PA    Requesting Provider: As above         History    Chief Complaint: Low back and left leg pain.    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  She has previously seen Dr. Hunag for right-sided sciatica.  She began experiencing more low back pain in November and December of last year.  The pain extends into the back of her left thigh and then in the left inguinal region.  She has had some distal anterior thigh numbness for 20 years.  Symptoms are worse with twisting.  Nothing really makes her pain better.  Physical therapy has not helped.  She has been on Neurontin for years.  Her symptoms are not constant but come and go.    Review of Systems   Constitutional: Positive for activity change and appetite change. Negative for chills, diaphoresis, fatigue, fever and unexpected weight change.   HENT: Negative for congestion, dental problem, drooling, ear discharge, ear pain, facial swelling, hearing loss, mouth sores, nosebleeds, postnasal drip, rhinorrhea, sinus pressure, sinus pain, sneezing, sore throat, tinnitus, trouble swallowing and voice change.    Eyes: Negative for photophobia, pain, discharge, redness, itching and visual disturbance.   Respiratory: Negative for apnea, cough, choking, chest tightness, shortness of breath, wheezing and stridor.    Cardiovascular: Positive for palpitations. Negative for chest pain and leg swelling.   Gastrointestinal: Positive for constipation. Negative for abdominal distention, abdominal pain, anal bleeding, blood in stool, diarrhea, nausea, rectal pain and vomiting.   Endocrine: Positive for cold intolerance and heat intolerance. Negative for polydipsia, polyphagia and polyuria.   Genitourinary: Negative for decreased urine volume, difficulty urinating, dysuria, enuresis, flank pain, frequency, genital sores, hematuria  "and urgency.   Musculoskeletal: Positive for back pain. Negative for arthralgias, gait problem, joint swelling, myalgias, neck pain and neck stiffness.   Skin: Negative for color change, pallor, rash and wound.   Allergic/Immunologic: Negative for environmental allergies, food allergies and immunocompromised state.   Neurological: Positive for numbness and headaches. Negative for dizziness, tremors, seizures, syncope, facial asymmetry, speech difficulty, weakness and light-headedness.   Hematological: Negative for adenopathy. Does not bruise/bleed easily.   Psychiatric/Behavioral: Positive for sleep disturbance. Negative for agitation, behavioral problems, confusion, decreased concentration, dysphoric mood, hallucinations, self-injury and suicidal ideas. The patient is nervous/anxious. The patient is not hyperactive.    All other systems reviewed and are negative.      The patient's past medical history, past surgical history, family history, and social history have been reviewed at length in the electronic medical record.    Physical Exam:   Temp 97.1 °F (36.2 °C)   Ht 167.6 cm (66\")   Wt 88.4 kg (194 lb 12.8 oz)   BMI 31.44 kg/m²   CONSTITUTIONAL: Patient is well-nourished, pleasant and appears stated age.  CV: Heart regular rate and rhythm without murmur, rub, or gallop.  PULMONARY: Lungs are clear to ascultation.  MUSCULOSKELETAL:  Straight leg raising is negative.  Luke's Sign is negative.  ROM in the low back is mildly limited in all directions.  Tenderness in the back to palpation is not observed.  NEUROLOGICAL:  Orientation, memory, attention span, language function, and cognition have been examined and are intact.  Strength is intact in the lower extremities to direct testing.  Muscle tone is normal throughout.  Station and gait are normal.  Sensation is intact to light touch testing throughout except in the left distal anterior thigh is reported.  Deep tendon reflexes are 1+ and " symmetrical.  Coordination is intact.      Medical Decision Making    Data Review:   (All imaging studies were personally reviewed unless stated otherwise)  MRI of the lumbar spine dated 7/1/2021 demonstrates some degenerative disc disease spanning L3-4, L4-5, and L5-S1.  Annular fissures are noted at L4-5 and L5-S1.  There is a bit of recess narrowing and mild disc bulging on the left at L4-5.  At L3-4 there is a low-grade listhesis as well as a generous right-sided disc protrusion that involves the recess and foramen.  There is a moderate amount of stenosis generally particularly in the recesses at L3-4.    Diagnosis:   1.  Mechanical low back pain.  2.  L3-4 spondylolisthesis with potential instability.  3.  Lumbar radiculopathy.    Treatment Options:   I have referred the patient for an epidural injection or two.  Prior to follow-up thereafter I am going to check some flexion-extension upright lateral lumbar spine x-rays to assess for instability.       Diagnosis Plan   1. Mechanical back pain     2. Spondylolisthesis of lumbar region  Ambulatory Referral to Pain Management    XR Spine Lumbar Complete With Flex & Ext   3. Lumbar radiculopathy     4. DDD (degenerative disc disease), lumbar         Scribed for Jareth Benavides MD by NITISH Monge 8/6/2021 12:44 EDT      I, Dr. Benavides, personally performed the services described in the documentation, as scribed in my presence, and it is both accurate and complete.

## 2021-09-30 ENCOUNTER — IMMUNIZATION (OUTPATIENT)
Dept: VACCINE CLINIC | Facility: HOSPITAL | Age: 51
End: 2021-09-30

## 2021-09-30 PROCEDURE — 0004A ADM SARSCOV2 30MCG/0.3ML BOOSTER: CPT | Performed by: INTERNAL MEDICINE

## 2021-09-30 PROCEDURE — 91300 HC SARSCOV02 VAC 30MCG/0.3ML IM: CPT | Performed by: INTERNAL MEDICINE

## 2022-07-13 ENCOUNTER — TRANSCRIBE ORDERS (OUTPATIENT)
Dept: ADMINISTRATIVE | Facility: HOSPITAL | Age: 52
End: 2022-07-13

## 2022-07-13 ENCOUNTER — LAB (OUTPATIENT)
Dept: LAB | Facility: HOSPITAL | Age: 52
End: 2022-07-13

## 2022-07-13 DIAGNOSIS — E03.4 ATROPHY OF THYROID (ACQUIRED): ICD-10-CM

## 2022-07-13 DIAGNOSIS — E03.4 ATROPHY OF THYROID (ACQUIRED): Primary | ICD-10-CM

## 2022-07-13 PROCEDURE — 80050 GENERAL HEALTH PANEL: CPT

## 2022-07-13 PROCEDURE — 80061 LIPID PANEL: CPT

## 2022-07-13 PROCEDURE — 36415 COLL VENOUS BLD VENIPUNCTURE: CPT

## 2022-07-14 LAB
ALBUMIN SERPL-MCNC: 4.1 G/DL (ref 3.5–5.2)
ALBUMIN/GLOB SERPL: 2.1 G/DL
ALP SERPL-CCNC: 67 U/L (ref 39–117)
ALT SERPL W P-5'-P-CCNC: 19 U/L (ref 1–33)
ANION GAP SERPL CALCULATED.3IONS-SCNC: 10 MMOL/L (ref 5–15)
AST SERPL-CCNC: 23 U/L (ref 1–32)
BASOPHILS # BLD AUTO: 0.06 10*3/MM3 (ref 0–0.2)
BASOPHILS NFR BLD AUTO: 0.7 % (ref 0–1.5)
BILIRUB SERPL-MCNC: 0.2 MG/DL (ref 0–1.2)
BUN SERPL-MCNC: 11 MG/DL (ref 6–20)
BUN/CREAT SERPL: 9.4 (ref 7–25)
CALCIUM SPEC-SCNC: 9 MG/DL (ref 8.6–10.5)
CHLORIDE SERPL-SCNC: 106 MMOL/L (ref 98–107)
CHOLEST SERPL-MCNC: 180 MG/DL (ref 0–200)
CO2 SERPL-SCNC: 25 MMOL/L (ref 22–29)
CREAT SERPL-MCNC: 1.17 MG/DL (ref 0.57–1)
DEPRECATED RDW RBC AUTO: 46.3 FL (ref 37–54)
EGFRCR SERPLBLD CKD-EPI 2021: 56.3 ML/MIN/1.73
EOSINOPHIL # BLD AUTO: 0.29 10*3/MM3 (ref 0–0.4)
EOSINOPHIL NFR BLD AUTO: 3.5 % (ref 0.3–6.2)
ERYTHROCYTE [DISTWIDTH] IN BLOOD BY AUTOMATED COUNT: 14.1 % (ref 12.3–15.4)
GLOBULIN UR ELPH-MCNC: 2 GM/DL
GLUCOSE SERPL-MCNC: 75 MG/DL (ref 65–99)
HCT VFR BLD AUTO: 39.6 % (ref 34–46.6)
HDLC SERPL-MCNC: 44 MG/DL (ref 40–60)
HGB BLD-MCNC: 12.9 G/DL (ref 12–15.9)
IMM GRANULOCYTES # BLD AUTO: 0.03 10*3/MM3 (ref 0–0.05)
IMM GRANULOCYTES NFR BLD AUTO: 0.4 % (ref 0–0.5)
LDLC SERPL CALC-MCNC: 103 MG/DL (ref 0–100)
LDLC/HDLC SERPL: 2.23 {RATIO}
LYMPHOCYTES # BLD AUTO: 2.48 10*3/MM3 (ref 0.7–3.1)
LYMPHOCYTES NFR BLD AUTO: 29.8 % (ref 19.6–45.3)
MCH RBC QN AUTO: 29.5 PG (ref 26.6–33)
MCHC RBC AUTO-ENTMCNC: 32.6 G/DL (ref 31.5–35.7)
MCV RBC AUTO: 90.4 FL (ref 79–97)
MONOCYTES # BLD AUTO: 0.87 10*3/MM3 (ref 0.1–0.9)
MONOCYTES NFR BLD AUTO: 10.5 % (ref 5–12)
NEUTROPHILS NFR BLD AUTO: 4.59 10*3/MM3 (ref 1.7–7)
NEUTROPHILS NFR BLD AUTO: 55.1 % (ref 42.7–76)
NRBC BLD AUTO-RTO: 0 /100 WBC (ref 0–0.2)
PLATELET # BLD AUTO: 278 10*3/MM3 (ref 140–450)
PMV BLD AUTO: 10.7 FL (ref 6–12)
POTASSIUM SERPL-SCNC: 4.1 MMOL/L (ref 3.5–5.2)
PROT SERPL-MCNC: 6.1 G/DL (ref 6–8.5)
RBC # BLD AUTO: 4.38 10*6/MM3 (ref 3.77–5.28)
SODIUM SERPL-SCNC: 141 MMOL/L (ref 136–145)
TRIGL SERPL-MCNC: 189 MG/DL (ref 0–150)
TSH SERPL DL<=0.05 MIU/L-ACNC: 0.21 UIU/ML (ref 0.27–4.2)
VLDLC SERPL-MCNC: 33 MG/DL (ref 5–40)
WBC NRBC COR # BLD: 8.32 10*3/MM3 (ref 3.4–10.8)

## 2022-11-07 ENCOUNTER — LAB (OUTPATIENT)
Dept: LAB | Facility: HOSPITAL | Age: 52
End: 2022-11-07

## 2022-11-07 ENCOUNTER — TELEMEDICINE (OUTPATIENT)
Dept: FAMILY MEDICINE CLINIC | Age: 52
End: 2022-11-07

## 2022-11-07 VITALS
HEART RATE: 55 BPM | SYSTOLIC BLOOD PRESSURE: 150 MMHG | DIASTOLIC BLOOD PRESSURE: 80 MMHG | HEIGHT: 66 IN | BODY MASS INDEX: 33.33 KG/M2 | WEIGHT: 207.4 LBS

## 2022-11-07 DIAGNOSIS — E66.09 CLASS 1 OBESITY DUE TO EXCESS CALORIES WITH BODY MASS INDEX (BMI) OF 33.0 TO 33.9 IN ADULT, UNSPECIFIED WHETHER SERIOUS COMORBIDITY PRESENT: ICD-10-CM

## 2022-11-07 DIAGNOSIS — Z76.89 ENCOUNTER TO ESTABLISH CARE: ICD-10-CM

## 2022-11-07 DIAGNOSIS — E66.09 CLASS 1 OBESITY DUE TO EXCESS CALORIES WITH BODY MASS INDEX (BMI) OF 33.0 TO 33.9 IN ADULT, UNSPECIFIED WHETHER SERIOUS COMORBIDITY PRESENT: Primary | ICD-10-CM

## 2022-11-07 LAB
BASOPHILS # BLD AUTO: 0.08 10*3/MM3 (ref 0–0.2)
BASOPHILS NFR BLD AUTO: 1 % (ref 0–1.5)
DEPRECATED RDW RBC AUTO: 45 FL (ref 37–54)
EOSINOPHIL # BLD AUTO: 0.23 10*3/MM3 (ref 0–0.4)
EOSINOPHIL NFR BLD AUTO: 3 % (ref 0.3–6.2)
ERYTHROCYTE [DISTWIDTH] IN BLOOD BY AUTOMATED COUNT: 14 % (ref 12.3–15.4)
HBA1C MFR BLD: 5.3 % (ref 4.8–5.6)
HCT VFR BLD AUTO: 42.6 % (ref 34–46.6)
HGB BLD-MCNC: 14.1 G/DL (ref 12–15.9)
IMM GRANULOCYTES # BLD AUTO: 0.03 10*3/MM3 (ref 0–0.05)
IMM GRANULOCYTES NFR BLD AUTO: 0.4 % (ref 0–0.5)
LYMPHOCYTES # BLD AUTO: 2.21 10*3/MM3 (ref 0.7–3.1)
LYMPHOCYTES NFR BLD AUTO: 28.5 % (ref 19.6–45.3)
MCH RBC QN AUTO: 29.3 PG (ref 26.6–33)
MCHC RBC AUTO-ENTMCNC: 33.1 G/DL (ref 31.5–35.7)
MCV RBC AUTO: 88.6 FL (ref 79–97)
MONOCYTES # BLD AUTO: 0.82 10*3/MM3 (ref 0.1–0.9)
MONOCYTES NFR BLD AUTO: 10.6 % (ref 5–12)
NEUTROPHILS NFR BLD AUTO: 4.38 10*3/MM3 (ref 1.7–7)
NEUTROPHILS NFR BLD AUTO: 56.5 % (ref 42.7–76)
NRBC BLD AUTO-RTO: 0 /100 WBC (ref 0–0.2)
PLATELET # BLD AUTO: 262 10*3/MM3 (ref 140–450)
PMV BLD AUTO: 10.4 FL (ref 6–12)
RBC # BLD AUTO: 4.81 10*6/MM3 (ref 3.77–5.28)
WBC NRBC COR # BLD: 7.75 10*3/MM3 (ref 3.4–10.8)

## 2022-11-07 PROCEDURE — 84466 ASSAY OF TRANSFERRIN: CPT

## 2022-11-07 PROCEDURE — 80050 GENERAL HEALTH PANEL: CPT

## 2022-11-07 PROCEDURE — 86803 HEPATITIS C AB TEST: CPT

## 2022-11-07 PROCEDURE — 82746 ASSAY OF FOLIC ACID SERUM: CPT

## 2022-11-07 PROCEDURE — 82728 ASSAY OF FERRITIN: CPT

## 2022-11-07 PROCEDURE — 36415 COLL VENOUS BLD VENIPUNCTURE: CPT

## 2022-11-07 PROCEDURE — 83540 ASSAY OF IRON: CPT

## 2022-11-07 PROCEDURE — 82306 VITAMIN D 25 HYDROXY: CPT

## 2022-11-07 PROCEDURE — 99203 OFFICE O/P NEW LOW 30 MIN: CPT | Performed by: NURSE PRACTITIONER

## 2022-11-07 PROCEDURE — 80061 LIPID PANEL: CPT

## 2022-11-07 PROCEDURE — 82607 VITAMIN B-12: CPT

## 2022-11-07 PROCEDURE — 84439 ASSAY OF FREE THYROXINE: CPT

## 2022-11-07 PROCEDURE — 83036 HEMOGLOBIN GLYCOSYLATED A1C: CPT

## 2022-11-07 RX ORDER — CYCLOBENZAPRINE HYDROCHLORIDE 7.5 MG/1
7.5 TABLET, FILM COATED ORAL 2 TIMES DAILY PRN
COMMUNITY
Start: 2022-09-03 | End: 2023-04-06 | Stop reason: SDUPTHER

## 2022-11-07 NOTE — PROGRESS NOTES
Subjective   Karla Connell is a 51 y.o. female.     History of Present Illness  Patient presents to the clinic today with interest in the [facility] weight loss program.  Patient is a candidate for the program with a BMI of 32.75 .  Patient denies personal history of pancreatitis and family history of medullary thyroid cancer.  Details regarding the process of the program discussed with patient.  Offered education on weight loss medications.  Patient voiced understanding.  Referral sent to specialty pharmacy for PA.  Outpatient labs ordered for baseline.  Patient had no further questions or concerns.      This provider is located in [facility] and is the established PCP for the primary care office within the [facility]. Pt is being seen today remotely through telehealth via ZOOM. Pt is being seen from personal location of choice and confirms that they are in a secure environment for this session. The patient's condition being diagnosed/treated is appropriate for telemedicine. Provider identified as [facility] Go. Patient gave consent to be seen remotely. When consent is given, they understand that the consent allows for patient identifiable information to be sent to a third party as needed. They may refuse to be seen remotely at any time.The electronic data is encrypted and password protected, and the patient has been advised of the potential risks to privacy not withstanding those measures.    Reviewed medical history, medications, and allergies    Review of Systems   Constitutional: Positive for unexpected weight change.   All other systems reviewed and are negative.    See history of Present Illness     Objective     Physical Exam   Constitutional: She appears well-developed and well-nourished.   HENT:   Head: Normocephalic.   Neck: Neck normal appearance.  Pulmonary/Chest: Effort normal.   Abdominal: Abdomen appears normal.   Musculoskeletal: Normal range of motion.   Neurological: She is alert.    Skin: Skin is dry.   Psychiatric: She has a normal mood and affect.   Vitals reviewed.      No flowsheet data found.    BMI is >= 30 and <35. (Class 1 Obesity). The following options were offered after discussion;: exercise counseling/recommendations, nutrition counseling/recommendations and referral to a weight management program   (Normal BMI:  18.5-24.9, OW 25-29.9, Obesity 30 or greater)      Assessment & Plan     Problems Addressed this Visit    None  Visit Diagnoses     Class 1 obesity due to excess calories with body mass index (BMI) of 33.0 to 33.9 in adult, unspecified whether serious comorbidity present    -  Primary    Relevant Orders    Ambulatory Referral to Specialty Pharmacy    Encounter to establish care        Relevant Orders    Ambulatory Referral to Specialty Pharmacy      Diagnoses       Codes Comments    Class 1 obesity due to excess calories with body mass index (BMI) of 33.0 to 33.9 in adult, unspecified whether serious comorbidity present    -  Primary ICD-10-CM: E66.09, Z68.33  ICD-9-CM: 278.00, V85.33     Encounter to establish care     ICD-10-CM: Z76.89  ICD-9-CM: V65.8             Diagnoses and all orders for this visit:    1. Class 1 obesity due to excess calories with body mass index (BMI) of 33.0 to 33.9 in adult, unspecified whether serious comorbidity present (Primary)  -     Ambulatory Referral to Specialty Pharmacy  -     CBC & Differential; Future  -     Comprehensive Metabolic Panel; Future  -     Ferritin; Future  -     Hemoglobin A1c; Future  -     Hepatitis C Antibody; Future  -     Iron Profile; Future  -     Vitamin D,25-Hydroxy; Future  -     Vitamin B12; Future  -     Folate; Future  -     TSH; Future  -     T4, Free; Future  -     Lipid Panel; Future    2. Encounter to establish care  -     Ambulatory Referral to Specialty Pharmacy  -     CBC & Differential; Future  -     Comprehensive Metabolic Panel; Future  -     Ferritin; Future  -     Hemoglobin A1c; Future  -      Hepatitis C Antibody; Future  -     Iron Profile; Future  -     Vitamin D,25-Hydroxy; Future  -     Vitamin B12; Future  -     Folate; Future  -     TSH; Future  -     T4, Free; Future  -     Lipid Panel; Future            This document has been electronically signed by CATE Jeffrey  November 11, 2022 16:43 EST    Part of this note may be an electronic transcription/translation of spoken language to printed text using the Dragon Dictation System.

## 2022-11-08 LAB
25(OH)D3 SERPL-MCNC: 45.9 NG/ML (ref 30–100)
ALBUMIN SERPL-MCNC: 4 G/DL (ref 3.5–5.2)
ALBUMIN/GLOB SERPL: 1.7 G/DL
ALP SERPL-CCNC: 64 U/L (ref 39–117)
ALT SERPL W P-5'-P-CCNC: 14 U/L (ref 1–33)
ANION GAP SERPL CALCULATED.3IONS-SCNC: 7 MMOL/L (ref 5–15)
AST SERPL-CCNC: 25 U/L (ref 1–32)
BILIRUB SERPL-MCNC: 0.3 MG/DL (ref 0–1.2)
BUN SERPL-MCNC: 13 MG/DL (ref 6–20)
BUN/CREAT SERPL: 13.1 (ref 7–25)
CALCIUM SPEC-SCNC: 8.9 MG/DL (ref 8.6–10.5)
CHLORIDE SERPL-SCNC: 105 MMOL/L (ref 98–107)
CHOLEST SERPL-MCNC: 171 MG/DL (ref 0–200)
CO2 SERPL-SCNC: 25 MMOL/L (ref 22–29)
CREAT SERPL-MCNC: 0.99 MG/DL (ref 0.57–1)
EGFRCR SERPLBLD CKD-EPI 2021: 68.7 ML/MIN/1.73
FERRITIN SERPL-MCNC: 105 NG/ML (ref 13–150)
FOLATE SERPL-MCNC: <2 NG/ML (ref 4.78–24.2)
GLOBULIN UR ELPH-MCNC: 2.3 GM/DL
GLUCOSE SERPL-MCNC: 83 MG/DL (ref 65–99)
HCV AB SER DONR QL: NORMAL
HDLC SERPL-MCNC: 50 MG/DL (ref 40–60)
IRON 24H UR-MRATE: 100 MCG/DL (ref 37–145)
IRON SATN MFR SERPL: 24 % (ref 20–50)
LDLC SERPL CALC-MCNC: 100 MG/DL (ref 0–100)
LDLC/HDLC SERPL: 1.94 {RATIO}
POTASSIUM SERPL-SCNC: 4.7 MMOL/L (ref 3.5–5.2)
PROT SERPL-MCNC: 6.3 G/DL (ref 6–8.5)
SODIUM SERPL-SCNC: 137 MMOL/L (ref 136–145)
T4 FREE SERPL-MCNC: 1.42 NG/DL (ref 0.93–1.7)
TIBC SERPL-MCNC: 408 MCG/DL (ref 298–536)
TRANSFERRIN SERPL-MCNC: 274 MG/DL (ref 200–360)
TRIGL SERPL-MCNC: 119 MG/DL (ref 0–150)
TSH SERPL DL<=0.05 MIU/L-ACNC: 0.14 UIU/ML (ref 0.27–4.2)
VIT B12 BLD-MCNC: 1374 PG/ML (ref 211–946)
VLDLC SERPL-MCNC: 21 MG/DL (ref 5–40)

## 2022-11-11 ENCOUNTER — DISEASE STATE MANAGEMENT VISIT (OUTPATIENT)
Dept: PHARMACY | Facility: HOSPITAL | Age: 52
End: 2022-11-11

## 2022-11-11 ENCOUNTER — SPECIALTY PHARMACY (OUTPATIENT)
Dept: PHARMACY | Facility: HOSPITAL | Age: 52
End: 2022-11-11

## 2022-11-11 VITALS
DIASTOLIC BLOOD PRESSURE: 91 MMHG | HEART RATE: 52 BPM | HEIGHT: 67 IN | BODY MASS INDEX: 32.33 KG/M2 | SYSTOLIC BLOOD PRESSURE: 155 MMHG | WEIGHT: 206 LBS

## 2022-11-11 RX ORDER — BIOTIN 10000 MCG
10 CAPSULE ORAL DAILY
COMMUNITY

## 2022-11-11 RX ORDER — PEN NEEDLE, DIABETIC 30 GX3/16"
1 NEEDLE, DISPOSABLE MISCELLANEOUS DAILY
Qty: 100 EACH | Refills: 0 | Status: SHIPPED | OUTPATIENT
Start: 2022-11-11 | End: 2023-04-06

## 2022-11-11 RX ORDER — CHLORAL HYDRATE 500 MG
1000 CAPSULE ORAL 2 TIMES DAILY
COMMUNITY

## 2022-11-11 NOTE — PROGRESS NOTES
[facility]  Weight Management Program        Karla Connell is a 51 y.o. female referred to the [facility] by Robert Myles for clinical pharmacy and specialty pharmacy management of [facility] for weight management.  Karla Connell has previously tried walking, eating healthy, and lifestyle changes for weight loss.  Current weight loss efforts include walking 2-4 times a week 30-45 minutes. Patient has lost ~100 lbs over the years by walking. Patient reports having a back injury a that has prevented her from walking as much as she previously did but has been working up to walking more. The patient denies a personal history or family history of thyroid cancer, denies a personal history of pancreatitis, and denies a diagnosis of gastroparesis. Patient had her thyroid removed in . Patient reports having a headache today which may be contributing to her increased BP. She states he BP is normally not that high. She also reports having history bradycardia issues.    Relevant Past Medical History and Co-morbidities  Past Medical History:   Diagnosis Date   • Anxiety    • Arthritis    • Back problem    • Bladder disorder    • Bradycardia    • Bulging lumbar disc    • Disease of thyroid gland    • Headache    • Hx of migraines      Social History     Socioeconomic History   • Marital status: Single   Tobacco Use   • Smoking status: Former     Packs/day: 2.00     Years: 20.00     Pack years: 40.00     Types: Cigarettes     Quit date: 2015     Years since quittin.2   • Smokeless tobacco: Never   Vaping Use   • Vaping Use: Former   Substance and Sexual Activity   • Alcohol use: Never   • Drug use: Never   • Sexual activity: Defer     Birth control/protection: None       Allergies  Patient has no known allergies.    Current Medication List    Current Outpatient Medications:   •  Biotin 10 MG capsule, Take 10 mg by mouth Daily., Disp: , Rfl:   •  buPROPion SR (WELLBUTRIN SR) 200  MG 12 hr tablet, Take 1 tablet by mouth 2 (Two) Times a Day., Disp: 60 tablet, Rfl: 11  •  buPROPion SR (Wellbutrin SR) 200 MG 12 hr tablet, Take 1 tablet(s) by mouth 2 times daily, Disp: 180 tablet, Rfl: 2  •  buPROPion SR (Wellbutrin SR) 200 MG 12 hr tablet, Take 1 tablet by mouth twice daily, Disp: 180 tablet, Rfl: 2  •  cyclobenzaprine (FEXMID) 7.5 MG tablet, Take 1 tablet by mouth 2 (Two) Times a Day As Needed., Disp: , Rfl:   •  estrogen, conjugated,-medroxyprogesterone (Prempro) 0.3-1.5 MG per tablet, Take 1 tablet by mouth daily., Disp: 28 tablet, Rfl: 3  •  gabapentin (Neurontin) 600 MG tablet, Take 1 tablet by mouth three times daily, Disp: 90 tablet, Rfl: 2  •  hydrOXYzine (ATARAX) 50 MG tablet, Take 1 tablet by mouth 2 (Two) Times a Day., Disp: 60 tablet, Rfl: 11  •  hydrOXYzine (ATARAX) 50 MG tablet, Take 1 tablet by mouth 2 (Two) Times a Day., Disp: 180 tablet, Rfl: 2  •  hydrOXYzine (ATARAX) 50 MG tablet, Take 1 tablet by mouth twice daily, Disp: 180 tablet, Rfl: 2  •  Insulin Pen Needle (Pen Needles) 32G X 4 MM misc, Use to inject Saxenda once daily., Disp: 100 each, Rfl: 0  •  levothyroxine (Levoxyl) 112 MCG tablet, Take 1 tablet by mouth daily, Disp: 90 tablet, Rfl: 1  •  levothyroxine (Levoxyl) 112 MCG tablet, Take 1 tablet(s) by mouth daily, Disp: 90 tablet, Rfl: 2  •  levothyroxine (SYNTHROID, LEVOTHROID) 112 MCG tablet, Take 1 tablet by mouth Daily., Disp: 30 tablet, Rfl: 11  •  Liraglutide (SAXENDA) 18 MG/3ML injection pen, Inject 0.6 mg under the skin into the appropriate area as directed Daily for 7 days, THEN 1.2 mg Daily for 7 days, THEN 1.8 mg Daily for 7 days, THEN 2.4 mg Daily for 7 days, THEN 3 mg Daily for 30 days., Disp: 15 mL, Rfl: 0  •  meloxicam (MOBIC) 15 MG tablet, Take 1 tablet by mouth Daily As Needed for pain, Disp: 30 tablet, Rfl: 11  •  meloxicam (MOBIC) 15 MG tablet, Take 1 tablet by mouth Daily As Needed for pain., Disp: 90 tablet, Rfl: 2  •  meloxicam (MOBIC) 15 MG  tablet, Take 1 tabet by mouth daily  as needed for pain, Disp: 90 tablet, Rfl: 2  •  montelukast (SINGULAIR) 10 MG tablet, Take 1 tablet by mouth Every Evening., Disp: 30 tablet, Rfl: 11  •  montelukast (SINGULAIR) 10 MG tablet, TAKE 1 TABLET BY MOUTH EACH EVENING., Disp: 90 tablet, Rfl: 2  •  montelukast (SINGULAIR) 10 MG tablet, Take 1 tablet by mouth each evening, Disp: 90 tablet, Rfl: 2  •  Omega-3 Fatty Acids (fish oil) 1000 MG capsule capsule, Take 1 capsule by mouth 2 (Two) Times a Day., Disp: , Rfl:   •  oxybutynin (DITROPAN) 5 MG tablet, Take 1 tablet by mouth 2 (Two) Times a Day., Disp: 60 tablet, Rfl: 11  •  oxybutynin (DITROPAN) 5 MG tablet, Take 1 tablet by mouth 2 times daily., Disp: 180 tablet, Rfl: 2  •  oxybutynin (DITROPAN) 5 MG tablet, Take 1 tablet by mouth twice daily, Disp: 180 tablet, Rfl: 2  •  SUMAtriptan (IMITREX) 25 MG tablet, Take 1 tablet by mouth as needed for migraine, may repeat every 2 hours as needed. *Maximum of 200mg per 24 hours*, Disp: 9 tablet, Rfl: 5  •  SUMAtriptan (IMITREX) 25 MG tablet, Take 1 tablet by mouth as needed for migraine, may repeat every 2 hours as needed, Disp: 9 tablet, Rfl: 11  •  SUMAtriptan (Imitrex) 25 MG tablet, Take 1 tablet by mouth as needed for migraine, may repeat every 2 hours as needed., Disp: 27 tablet, Rfl: 2  •  SUMAtriptan (Imitrex) 25 MG tablet, Take 1 tablet by mouth as needed for migraine, may repeat every 2 hours as needed (max 200 mg in 24 hrs)., Disp: 27 tablet, Rfl: 2    Drug Interactions  Saxenda + Prempro -  may diminish the therapeutic effect of Antidiabetic Agents.    Relevant Laboratory Values  Lab Results   Component Value Date    CHOL 171 11/07/2022    TRIG 119 11/07/2022    HDL 50 11/07/2022     11/07/2022     There is no height or weight on file to calculate BMI.    Vaccinations:   Patient recommended to keep up with routine vaccinations.     Goals of Therapy  • Clinical Goals or Therapeutic Targets: 10% weight loss  goal      Date 11/11/22       Weight (lb) 206 lbs       BMI kg/ 32.75       Waist Circumference (in)  45 in           Medication Assessment & Plan    Patient's current BMI is 32.75, which is considered Class I Obesity.     Will order Saxenda 0.6 mg SC daily x 1 week, then increase to 1.2mg SC daily x 1 week, then increase to 1.8mg SC daily x 1 week, then increase to 2.4mg SC daily x 1 week, then increase to 3mg SC daily. If at anytime the patient cannot tolerate an increased dose during dose escalation, instructed Pt to delay dose escalation for 1 week and call clinic.     The following medications will need dose adjustments/closer monitoring once the GLP1 is started: None    Patient BP is elevated today. Patient does not take any medication for BP and states it is normally not this high, she has a headache today. Patient has a BP cuff at home, advised her to monitor BP at home and if she sees a trend of it remaining high to contact her PCP. Reevaluate BP at next visit.     Discussed lifestyle modifications, including diet and exercise.  Patient education and injection training provided.     Worked with patient to create SMART Goal(s):   1. Increase water intake to 40 oz of water a day   2. Continue to walk 30 minutes 3 times a week    Will follow-up with patient in 4 weeks, or sooner if needed.       Medication Education Provided    [employer] Pen needles NDC [address]-5    SAXENDA® (liraglutide)  Medication Expectations   Why am I taking this medication? You are taking Saxenda for weight loss because you have excess weight and also have weight-related medical problems or obesity. It should be used along with a reduced calorie diet and increased physical activity.    What should I expect while on this medication? You should expect to lose at least 4% of your body weight after 4 months of therapy. It can also help keep weight lost off.    How does the medication work? Saxenda is an injection that addresses one of  your body's natural responses to weight loss. It works like a naturally produced hormone in the body called GLP-1 that regulates appetite which can lead to eating fewer calories and losing weight. This medication also slows down food from leaving your stomach, making you feel whitaker for longer.   How long will I be on this medication for? The amount of time you will be on this medication will be determined by your doctor. Do not abruptly stop this medication without talking to your doctor first.    How do I take this medication? Take as directed on your prescription label. Saxenda is injected under the skin (subcutaneously) of your stomach, thigh, or upper arm. This medication should be taken once daily and can be given with or without food. Use a different injection site in the same body region each day.     For each new prefilled pen, prime the needle before the first injection by turning the dose selector to the flow check symbol and injecting into the air (priming is not required for subsequent injections). Use a new needle for each injection. Once the needle is inserted, continue to press the button until the dial has returned to 0 and for an additional 6 seconds. Then remove the needle and discard.    What are some possible side effects? You may notice you don't feel as hungry, especially when you first start using Saxenda. Some common side effects include nausea, vomiting, diarrhea, vomiting, indigestion, constipation, tiredness, and headache. Redness, itching, and/or swelling can occur where the shot was given. You should also monitor for low blood sugar (hypoglycemia), especially if you are taking Saxenda with other medications that cause low blood sugar. Stop using Saxenda and call your doctor immediately if you have severe pain in your stomach area that will not go away as this could be a sign of pancreatitis (inflammation of your pancreas).     What happens if I miss a dose? If you miss a dose, take it  as soon as you remember. If it is close to your next dose, skip it and go back to your regular dosing schedule. Never take 2 doses at once. If you miss your dose for 3 days or more, call your doctor to talk about how to restart Saxenda.      Medication Safety   What are things I should warn my doctor immediately about? Do not use Saxenda if you or a family member have ever had medullary thyroid cancer (MTC) or Multiple Endocrine Neoplasia syndrome type 2 (MEN 2). Tell your doctor if you get a lump or swelling in your neck, hoarseness, difficulty swallowing, or feel short of breath (these may be symptoms of thyroid cancer). Talk to your doctor if you have or have had problems with your pancreas, kidneys, or liver. Tell your doctor if you have problems with digesting food or slowed emptying of your stomach (gastroparesis). Talk to your doctor if you are pregnant, planning to become pregnant, or breastfeeding. Also tell your doctor if you notice any signs/symptoms of an allergic reaction (rash, hives, difficulty breathing, etc.).   What are things that I should be cautious of? Be cautious of any side effects from this medication. Talk to your doctor if any new ones develop or aren't getting better.   What are some medications that can interact with this one? Taking Saxenda with other medications that lower your blood sugar such as insulins and glipizide/glimepiride/glyburide may increase the risk of low blood sugar. It should not be taken with other medicines called GLP-1 receptor agonists, as these work the same way as Saxenda. Because Saxenda slows stomach emptying, it can affect the way some medicines work. Always tell your doctor or pharmacist immediately if you start taking any new medications, including over-the-counter medications, vitamins, and herbal supplements.      Medication Storage/Handling   How should I handle this medication? Keep this medication out of reach of pets/children and keep the pen capped  when not in use. Do not share your medicine pens with others.    How does this medication need to be stored? Store your new, unused pens in the original carton in the refrigerator. Protect it from light. Do not freeze. You may store your opened pen at room temperature or in the refrigerator for up to 30 days. Always remove the needle from the pen before storing.    How should I dispose of this medication? Used Saxenda pens should be thrown away after 30 days. Place your used pen and needle in an approved sharps container. If you do not have a sharps container, you may use a household container made of heavy-duty plastic with a tight-fitting lid that is leak resistant (e.g., heavy-duty plastic laundry detergent bottle). If your doctor decides to stop this medication, take to your local police station for proper disposal. Some pharmacies also have take-back bins for medication drop-off.       Resources/Support   How can I remind myself to take this medication? You can download reminder apps to help you manage your refills. You may also set an alarm on your phone to remind you.    Is financial support available?  [employer] can provide co-pay cards if you have commercial insurance.    Which vaccines are recommended for me? Talk to your doctor about these vaccines: Flu, Coronavirus (COVID-19), Pneumococcal (pneumonia), Tdap, Zoster (shingles)        Lyubov Santoro RPH  11/11/2022  15:19 EST

## 2022-11-11 NOTE — PROGRESS NOTES
[facility]  Weight Management Program        Karla Connell is a 51 y.o. female referred to the [facility] Management Clinic by Robert Myles for clinical pharmacy and specialty pharmacy management of [facility] for weight management.  Karla Connell has previously tried walking, eating healthy, and lifestyle changes for weight loss.  Current weight loss efforts include walking 2-4 times a week 30-45 minutes. Patient has lost ~100 lbs over the years by walking. Patient reports having a back injury a that has prevented her from walking as much as she previously did but has been working up to walking more. The patient denies a personal history or family history of thyroid cancer, denies a personal history of pancreatitis, and denies a diagnosis of gastroparesis. Patient had her thyroid removed in . Patient reports having a headache today which may be contributing to her increased BP. She states he BP is normally not that high. She also reports having history bradycardia issues.    Relevant Past Medical History and Co-morbidities  Past Medical History:   Diagnosis Date   • Anxiety    • Arthritis    • Back problem    • Bladder disorder    • Bradycardia    • Bulging lumbar disc    • Disease of thyroid gland    • Headache    • Hx of migraines      Social History     Socioeconomic History   • Marital status: Single   Tobacco Use   • Smoking status: Former     Packs/day: 2.00     Years: 20.00     Pack years: 40.00     Types: Cigarettes     Quit date: 2015     Years since quittin.2   • Smokeless tobacco: Never   Vaping Use   • Vaping Use: Former   Substance and Sexual Activity   • Alcohol use: Never   • Drug use: Never   • Sexual activity: Defer     Birth control/protection: None       Allergies  Patient has no known allergies.    Current Medication List    Current Outpatient Medications:   •  Biotin 10 MG capsule, Take 10 mg by mouth Daily., Disp: , Rfl:   •  buPROPion SR (Wellbutrin SR) 200  MG 12 hr tablet, Take 1 tablet(s) by mouth 2 times daily, Disp: 180 tablet, Rfl: 2  •  cyclobenzaprine (FEXMID) 7.5 MG tablet, Take 1 tablet by mouth 2 (Two) Times a Day As Needed., Disp: , Rfl:   •  estrogen, conjugated,-medroxyprogesterone (Prempro) 0.3-1.5 MG per tablet, Take 1 tablet by mouth daily., Disp: 28 tablet, Rfl: 3  •  gabapentin (Neurontin) 600 MG tablet, Take 1 tablet by mouth three times daily, Disp: 90 tablet, Rfl: 2  •  hydrOXYzine (ATARAX) 50 MG tablet, Take 1 tablet by mouth twice daily, Disp: 180 tablet, Rfl: 2  •  levothyroxine (Levoxyl) 112 MCG tablet, Take 1 tablet(s) by mouth daily, Disp: 90 tablet, Rfl: 2  •  meloxicam (MOBIC) 15 MG tablet, Take 1 tabet by mouth daily  as needed for pain, Disp: 90 tablet, Rfl: 2  •  montelukast (SINGULAIR) 10 MG tablet, Take 1 tablet by mouth each evening, Disp: 90 tablet, Rfl: 2  •  Omega-3 Fatty Acids (fish oil) 1000 MG capsule capsule, Take 1 capsule by mouth 2 (Two) Times a Day., Disp: , Rfl:   •  oxybutynin (DITROPAN) 5 MG tablet, Take 1 tablet by mouth twice daily, Disp: 180 tablet, Rfl: 2  •  SUMAtriptan (Imitrex) 25 MG tablet, Take 1 tablet by mouth as needed for migraine, may repeat every 2 hours as needed (max 200 mg in 24 hrs)., Disp: 27 tablet, Rfl: 2  •  buPROPion SR (WELLBUTRIN SR) 200 MG 12 hr tablet, Take 1 tablet by mouth 2 (Two) Times a Day., Disp: 60 tablet, Rfl: 11  •  buPROPion SR (Wellbutrin SR) 200 MG 12 hr tablet, Take 1 tablet by mouth twice daily, Disp: 180 tablet, Rfl: 2  •  hydrOXYzine (ATARAX) 50 MG tablet, Take 1 tablet by mouth 2 (Two) Times a Day., Disp: 60 tablet, Rfl: 11  •  hydrOXYzine (ATARAX) 50 MG tablet, Take 1 tablet by mouth 2 (Two) Times a Day., Disp: 180 tablet, Rfl: 2  •  Insulin Pen Needle (Pen Needles) 32G X 4 MM misc, Use to inject Saxenda once daily., Disp: 100 each, Rfl: 0  •  levothyroxine (Levoxyl) 112 MCG tablet, Take 1 tablet by mouth daily, Disp: 90 tablet, Rfl: 1  •  levothyroxine (SYNTHROID,  LEVOTHROID) 112 MCG tablet, Take 1 tablet by mouth Daily., Disp: 30 tablet, Rfl: 11  •  Liraglutide (SAXENDA) 18 MG/3ML injection pen, Inject 0.6 mg under the skin into the appropriate area as directed Daily for 7 days, THEN 1.2 mg Daily for 7 days, THEN 1.8 mg Daily for 7 days, THEN 2.4 mg Daily for 7 days, THEN 3 mg Daily for 30 days., Disp: 15 mL, Rfl: 0  •  meloxicam (MOBIC) 15 MG tablet, Take 1 tablet by mouth Daily As Needed for pain, Disp: 30 tablet, Rfl: 11  •  meloxicam (MOBIC) 15 MG tablet, Take 1 tablet by mouth Daily As Needed for pain., Disp: 90 tablet, Rfl: 2  •  montelukast (SINGULAIR) 10 MG tablet, Take 1 tablet by mouth Every Evening., Disp: 30 tablet, Rfl: 11  •  montelukast (SINGULAIR) 10 MG tablet, TAKE 1 TABLET BY MOUTH EACH EVENING., Disp: 90 tablet, Rfl: 2  •  oxybutynin (DITROPAN) 5 MG tablet, Take 1 tablet by mouth 2 (Two) Times a Day., Disp: 60 tablet, Rfl: 11  •  oxybutynin (DITROPAN) 5 MG tablet, Take 1 tablet by mouth 2 times daily., Disp: 180 tablet, Rfl: 2  •  SUMAtriptan (IMITREX) 25 MG tablet, Take 1 tablet by mouth as needed for migraine, may repeat every 2 hours as needed. *Maximum of 200mg per 24 hours*, Disp: 9 tablet, Rfl: 5  •  SUMAtriptan (IMITREX) 25 MG tablet, Take 1 tablet by mouth as needed for migraine, may repeat every 2 hours as needed, Disp: 9 tablet, Rfl: 11  •  SUMAtriptan (Imitrex) 25 MG tablet, Take 1 tablet by mouth as needed for migraine, may repeat every 2 hours as needed., Disp: 27 tablet, Rfl: 2    Drug Interactions  Saxenda + Prempro -  may diminish the therapeutic effect of Antidiabetic Agents.    Relevant Laboratory Values  Lab Results   Component Value Date    CHOL 171 11/07/2022    TRIG 119 11/07/2022    HDL 50 11/07/2022     11/07/2022     There is no height or weight on file to calculate BMI.    Vaccinations:   Patient recommended to keep up with routine vaccinations.     Goals of Therapy  • Clinical Goals or Therapeutic Targets: 10% weight loss  goal      Date 11/11/22       Weight (lb) 206 lbs       BMI kg/ 32.75       Waist Circumference (in)  45 in           Medication Assessment & Plan    Patient's current BMI is 32.75, which is considered Class I Obesity.     Will order Saxenda 0.6 mg SC daily x 1 week, then increase to 1.2mg SC daily x 1 week, then increase to 1.8mg SC daily x 1 week, then increase to 2.4mg SC daily x 1 week, then increase to 3mg SC daily. If at anytime the patient cannot tolerate an increased dose during dose escalation, instructed Pt to delay dose escalation for 1 week and call clinic.     The following medications will need dose adjustments/closer monitoring once the GLP1 is started: None    Patient BP is elevated today. Patient does not take any medication for BP and states it is normally not this high, she has a headache today. Patient has a BP cuff at home, advised her to monitor BP at home and if she sees a trend of it remaining high to contact her PCP. Reevaluate BP at next visit.     Discussed lifestyle modifications, including diet and exercise.  Patient education and injection training provided.     Worked with patient to create SMART Goal(s):   1. Increase water intake to 40 oz of water a day   2. Continue to walk 30 minutes 3 times a week    Will follow-up with patient in 4 weeks, or sooner if needed.       Medication Education Provided    Trueplus Pen needles NDC [address]-0    SAXENDA® (liraglutide)  Medication Expectations   Why am I taking this medication? You are taking Saxenda for weight loss because you have excess weight and also have weight-related medical problems or obesity. It should be used along with a reduced calorie diet and increased physical activity.    What should I expect while on this medication? You should expect to lose at least 4% of your body weight after 4 months of therapy. It can also help keep weight lost off.    How does the medication work? Saxenda is an injection that addresses one of  your body's natural responses to weight loss. It works like a naturally produced hormone in the body called GLP-1 that regulates appetite which can lead to eating fewer calories and losing weight. This medication also slows down food from leaving your stomach, making you feel whitaker for longer.   How long will I be on this medication for? The amount of time you will be on this medication will be determined by your doctor. Do not abruptly stop this medication without talking to your doctor first.    How do I take this medication? Take as directed on your prescription label. Saxenda is injected under the skin (subcutaneously) of your stomach, thigh, or upper arm. This medication should be taken once daily and can be given with or without food. Use a different injection site in the same body region each day.     For each new prefilled pen, prime the needle before the first injection by turning the dose selector to the flow check symbol and injecting into the air (priming is not required for subsequent injections). Use a new needle for each injection. Once the needle is inserted, continue to press the button until the dial has returned to 0 and for an additional 6 seconds. Then remove the needle and discard.    What are some possible side effects? You may notice you don't feel as hungry, especially when you first start using Saxenda. Some common side effects include nausea, vomiting, diarrhea, vomiting, indigestion, constipation, tiredness, and headache. Redness, itching, and/or swelling can occur where the shot was given. You should also monitor for low blood sugar (hypoglycemia), especially if you are taking Saxenda with other medications that cause low blood sugar. Stop using Saxenda and call your doctor immediately if you have severe pain in your stomach area that will not go away as this could be a sign of pancreatitis (inflammation of your pancreas).     What happens if I miss a dose? If you miss a dose, take it  as soon as you remember. If it is close to your next dose, skip it and go back to your regular dosing schedule. Never take 2 doses at once. If you miss your dose for 3 days or more, call your doctor to talk about how to restart Saxenda.      Medication Safety   What are things I should warn my doctor immediately about? Do not use Saxenda if you or a family member have ever had medullary thyroid cancer (MTC) or Multiple Endocrine Neoplasia syndrome type 2 (MEN 2). Tell your doctor if you get a lump or swelling in your neck, hoarseness, difficulty swallowing, or feel short of breath (these may be symptoms of thyroid cancer). Talk to your doctor if you have or have had problems with your pancreas, kidneys, or liver. Tell your doctor if you have problems with digesting food or slowed emptying of your stomach (gastroparesis). Talk to your doctor if you are pregnant, planning to become pregnant, or breastfeeding. Also tell your doctor if you notice any signs/symptoms of an allergic reaction (rash, hives, difficulty breathing, etc.).   What are things that I should be cautious of? Be cautious of any side effects from this medication. Talk to your doctor if any new ones develop or aren't getting better.   What are some medications that can interact with this one? Taking Saxenda with other medications that lower your blood sugar such as insulins and glipizide/glimepiride/glyburide may increase the risk of low blood sugar. It should not be taken with other medicines called GLP-1 receptor agonists, as these work the same way as Saxenda. Because Saxenda slows stomach emptying, it can affect the way some medicines work. Always tell your doctor or pharmacist immediately if you start taking any new medications, including over-the-counter medications, vitamins, and herbal supplements.      Medication Storage/Handling   How should I handle this medication? Keep this medication out of reach of pets/children and keep the pen capped  when not in use. Do not share your medicine pens with others.    How does this medication need to be stored? Store your new, unused pens in the original carton in the refrigerator. Protect it from light. Do not freeze. You may store your opened pen at room temperature or in the refrigerator for up to 30 days. Always remove the needle from the pen before storing.    How should I dispose of this medication? Used Saxenda pens should be thrown away after 30 days. Place your used pen and needle in an approved sharps container. If you do not have a sharps container, you may use a household container made of heavy-duty plastic with a tight-fitting lid that is leak resistant (e.g., heavy-duty plastic laundry detergent bottle). If your doctor decides to stop this medication, take to your local police station for proper disposal. Some pharmacies also have take-back bins for medication drop-off.       Resources/Support   How can I remind myself to take this medication? You can download reminder apps to help you manage your refills. You may also set an alarm on your phone to remind you.    Is financial support available?  [employer] can provide co-pay cards if you have commercial insurance.    Which vaccines are recommended for me? Talk to your doctor about these vaccines: Flu, Coronavirus (COVID-19), Pneumococcal (pneumonia), Tdap, Zoster (shingles)        Lyubov Santoro RPH  11/11/2022  11:32 EST

## 2022-12-09 ENCOUNTER — DISEASE STATE MANAGEMENT VISIT (OUTPATIENT)
Dept: PHARMACY | Facility: HOSPITAL | Age: 52
End: 2022-12-09

## 2022-12-09 VITALS
SYSTOLIC BLOOD PRESSURE: 114 MMHG | DIASTOLIC BLOOD PRESSURE: 80 MMHG | HEART RATE: 57 BPM | BODY MASS INDEX: 32.5 KG/M2 | WEIGHT: 204.4 LBS

## 2022-12-09 NOTE — PROGRESS NOTES
Medication Management Clinic  Weight Management Program        Karla Connell is a 52 y.o. female referred to the Medication Management Clinic by Robert Myles for clinical pharmacy and specialty pharmacy management of [facility] for weight management. Karla Connell has previously tried walking, eating healthy, and lifestyle changes for weight loss.  Current weight loss efforts include walking 2-4 times a week 30-45 minutes. Patient has lost ~100 lbs over the years by walking. Patient reports having a back injury that has prevented her from walking as much as she previously did but has been working up to walking more. The patient denies a personal history or family history of thyroid cancer, denies a personal history of pancreatitis, and denies a diagnosis of gastroparesis. Patient had her thyroid removed in . Her BP is much improved today.     She denies missing doses of Saxenda and denies any trouble giving the injections. She reported experiencing nausea when first starting Saxenda, but that has since resolved. She denies any episodes of hypoglycemia and is aware of signs/symptoms. She has one more dose of Saxenda 2.4 mg before increasing to the 3mg dose. She has made progress towards her SMART goals. She reports walking at least 3 times per week and has exceeded her water intake goals.      Relevant Past Medical History and Co-morbidities  Past Medical History:   Diagnosis Date   • Anxiety    • Arthritis    • Back problem    • Bladder disorder    • Bradycardia    • Bulging lumbar disc    • Disease of thyroid gland    • Headache    • Hx of migraines      Social History     Socioeconomic History   • Marital status: Single   Tobacco Use   • Smoking status: Former     Packs/day: 2.00     Years: 20.00     Pack years: 40.00     Types: Cigarettes     Quit date: 2015     Years since quittin.3   • Smokeless tobacco: Never   Vaping Use   • Vaping Use: Former   Substance and Sexual Activity   • Alcohol use:  Never   • Drug use: Never   • Sexual activity: Defer     Birth control/protection: None       Allergies  Patient has no known allergies.    Current Medication List    Current Outpatient Medications:   •  Biotin 10 MG capsule, Take 10 mg by mouth Daily., Disp: , Rfl:   •  buPROPion SR (WELLBUTRIN SR) 200 MG 12 hr tablet, Take 1 tablet by mouth 2 (Two) Times a Day., Disp: 60 tablet, Rfl: 11  •  buPROPion SR (Wellbutrin SR) 200 MG 12 hr tablet, Take 1 tablet(s) by mouth 2 times daily, Disp: 180 tablet, Rfl: 2  •  buPROPion SR (Wellbutrin SR) 200 MG 12 hr tablet, Take 1 tablet by mouth twice daily, Disp: 180 tablet, Rfl: 2  •  cyclobenzaprine (FEXMID) 7.5 MG tablet, Take 1 tablet by mouth 2 (Two) Times a Day As Needed., Disp: , Rfl:   •  estrogen, conjugated,-medroxyprogesterone (Prempro) 0.3-1.5 MG per tablet, Take 1 tablet by mouth daily., Disp: 28 tablet, Rfl: 3  •  gabapentin (Neurontin) 600 MG tablet, Take 1 tablet by mouth three times daily, Disp: 90 tablet, Rfl: 2  •  hydrOXYzine (ATARAX) 50 MG tablet, Take 1 tablet by mouth 2 (Two) Times a Day., Disp: 60 tablet, Rfl: 11  •  hydrOXYzine (ATARAX) 50 MG tablet, Take 1 tablet by mouth 2 (Two) Times a Day., Disp: 180 tablet, Rfl: 2  •  hydrOXYzine (ATARAX) 50 MG tablet, Take 1 tablet by mouth twice daily, Disp: 180 tablet, Rfl: 2  •  Insulin Pen Needle (Pen Needles) 32G X 4 MM misc, Use to inject Saxenda once daily., Disp: 100 each, Rfl: 0  •  levothyroxine (Levoxyl) 112 MCG tablet, Take 1 tablet by mouth daily, Disp: 90 tablet, Rfl: 1  •  levothyroxine (Levoxyl) 112 MCG tablet, Take 1 tablet(s) by mouth daily, Disp: 90 tablet, Rfl: 2  •  levothyroxine (SYNTHROID, LEVOTHROID) 112 MCG tablet, Take 1 tablet by mouth Daily., Disp: 30 tablet, Rfl: 11  •  Liraglutide (SAXENDA) 18 MG/3ML injection pen, Inject 0.6 mg under the skin into the appropriate area as directed Daily for 7 days, THEN 1.2 mg Daily for 7 days, THEN 1.8 mg Daily for 7 days, THEN 2.4 mg Daily for 7  days, THEN 3 mg Daily for 30 days., Disp: 15 mL, Rfl: 0  •  meloxicam (MOBIC) 15 MG tablet, Take 1 tablet by mouth Daily As Needed for pain, Disp: 30 tablet, Rfl: 11  •  meloxicam (MOBIC) 15 MG tablet, Take 1 tablet by mouth Daily As Needed for pain., Disp: 90 tablet, Rfl: 2  •  meloxicam (MOBIC) 15 MG tablet, Take 1 tabet by mouth daily  as needed for pain, Disp: 90 tablet, Rfl: 2  •  montelukast (SINGULAIR) 10 MG tablet, Take 1 tablet by mouth Every Evening., Disp: 30 tablet, Rfl: 11  •  montelukast (SINGULAIR) 10 MG tablet, TAKE 1 TABLET BY MOUTH EACH EVENING., Disp: 90 tablet, Rfl: 2  •  montelukast (SINGULAIR) 10 MG tablet, Take 1 tablet by mouth each evening, Disp: 90 tablet, Rfl: 2  •  Omega-3 Fatty Acids (fish oil) 1000 MG capsule capsule, Take 1 capsule by mouth 2 (Two) Times a Day., Disp: , Rfl:   •  oxybutynin (DITROPAN) 5 MG tablet, Take 1 tablet by mouth 2 (Two) Times a Day., Disp: 60 tablet, Rfl: 11  •  oxybutynin (DITROPAN) 5 MG tablet, Take 1 tablet by mouth 2 times daily., Disp: 180 tablet, Rfl: 2  •  oxybutynin (DITROPAN) 5 MG tablet, Take 1 tablet by mouth twice daily, Disp: 180 tablet, Rfl: 2  •  SUMAtriptan (IMITREX) 25 MG tablet, Take 1 tablet by mouth as needed for migraine, may repeat every 2 hours as needed, Disp: 9 tablet, Rfl: 11  •  SUMAtriptan (Imitrex) 25 MG tablet, Take 1 tablet by mouth as needed for migraine, may repeat every 2 hours as needed., Disp: 27 tablet, Rfl: 2  •  SUMAtriptan (Imitrex) 25 MG tablet, Take 1 tablet by mouth as needed for migraine, may repeat every 2 hours as needed (max 200 mg in 24 hrs)., Disp: 27 tablet, Rfl: 2    Drug Interactions  Saxenda + Prempro -  may diminish the therapeutic effect of Antidiabetic Agents.    Relevant Laboratory Values  Lab Results   Component Value Date    CHOL 171 11/07/2022    TRIG 119 11/07/2022    HDL 50 11/07/2022     11/07/2022     There is no height or weight on file to calculate BMI.    Vaccinations:   Patient  recommended to keep up with routine vaccinations.     Goals of Therapy  • Clinical Goals or Therapeutic Targets: 10% weight loss goal      Date 11/11/22 12/9/22      Weight (lb) 206 lbs 204.4 lbs      BMI kg/ 32.75 32.5      Waist Circumference (in)  45 in 47 in          Medication Assessment & Plan    Patient's current BMI is 32.5, which is considered Class I Obesity. She has lost 1.6 lbs since last visit.     Will order Saxenda 3mg SC daily.    The following medications will need dose adjustments/closer monitoring once the GLP1 is started: None. Discussed possible increase risk of hypoglycemic episodes. Patient reports that she occasionally experiences episodes of hypoglycemia prior to starting Saxenda. Instructed the patient to watch for signs/symptoms of low blood glucose. She denies any issues since starting therapy.     Discussed lifestyle modifications, including diet and exercise. Patient education and injection training provided.     Worked with patient to create SMART Goal(s):   1. Increase water intake to 40 oz of water a day   2. Continue to walk 30 minutes 3 times a week    Will follow-up with patient in 4 weeks, or sooner if needed.     Diane Reagan, PharmD  12/9/2022  13:52 EST

## 2022-12-22 ENCOUNTER — TELEPHONE (OUTPATIENT)
Dept: PSYCHIATRY | Facility: CLINIC | Age: 52
End: 2022-12-22

## 2022-12-22 DIAGNOSIS — N30.90 CYSTITIS: Primary | ICD-10-CM

## 2022-12-22 RX ORDER — NITROFURANTOIN 25; 75 MG/1; MG/1
100 CAPSULE ORAL 2 TIMES DAILY
Qty: 14 CAPSULE | Refills: 0 | Status: SHIPPED | OUTPATIENT
Start: 2022-12-22 | End: 2023-04-06

## 2022-12-22 NOTE — TELEPHONE ENCOUNTER
Patient of [facility] called to request medication for a UTI. Robert is out of office. Can you send something in for this?

## 2023-01-05 ENCOUNTER — DISEASE STATE MANAGEMENT VISIT (OUTPATIENT)
Dept: PHARMACY | Facility: HOSPITAL | Age: 53
End: 2023-01-05
Payer: COMMERCIAL

## 2023-01-05 VITALS
SYSTOLIC BLOOD PRESSURE: 145 MMHG | HEART RATE: 61 BPM | BODY MASS INDEX: 31.04 KG/M2 | HEIGHT: 67 IN | DIASTOLIC BLOOD PRESSURE: 98 MMHG | WEIGHT: 197.8 LBS

## 2023-01-05 DIAGNOSIS — E66.01 MORBID (SEVERE) OBESITY DUE TO EXCESS CALORIES: Primary | ICD-10-CM

## 2023-01-05 DIAGNOSIS — E66.9 OBESITY (BMI 30.0-34.9): ICD-10-CM

## 2023-01-05 RX ORDER — SEMAGLUTIDE 0.25 MG/.5ML
0.25 INJECTION, SOLUTION SUBCUTANEOUS WEEKLY
Qty: 2 ML | Refills: 0 | Status: SHIPPED | OUTPATIENT
Start: 2023-01-05 | End: 2023-03-09

## 2023-01-05 NOTE — PROGRESS NOTES
[facility] Management [facility]  Weight Management Program        Karla Connell is a 52 y.o. female referred to the [facility] Management Clinic by Robert Myles for clinical pharmacy and specialty pharmacy management of [facility] for weight management. Karla Connell has previously tried walking, eating healthy, and lifestyle changes for weight loss.  Current weight loss efforts include walking 2-4 times a week 30-45 minutes. Patient has lost ~100 lbs over the years by walking. Patient reports having a back injury that has prevented her from walking as much as she previously did but has been working up to walking more. The patient denies a personal history or family history of thyroid cancer, denies a personal history of pancreatitis, and denies a diagnosis of gastroparesis. Patient had her thyroid removed in 2007. Pt BP elevated today. She reports she has been running around and its been a really busy day in [facility] and is about to get off work and that BP has not been running this high.     She denies missing doses of Saxenda and denies any trouble giving the injections. She reports being on the 3 mg dose of Saxenda for ~ 3 weeks.  She reported experiencing nausea when first starting Saxenda, but that has since resolved. Pt does report some constipation but has not tried anything to help alleviate it. She denies any episodes of hypoglycemia and is aware of signs/symptoms.  She has made progress towards her SMART goals. She reports walking at least 3 times per week and has exceeded her water intake goals.  Patient denies any lumps/swelling/hoarsness of throat/neck.     Relevant Past Medical History and Co-morbidities  Past Medical History:   Diagnosis Date   • Anxiety    • Arthritis    • Back problem    • Bladder disorder    • Bradycardia    • Bulging lumbar disc    • Disease of thyroid gland    • Headache    • Hx of migraines      Social History     Socioeconomic History   • Marital status: Single   Tobacco Use   •  Smoking status: Former     Packs/day: 2.00     Years: 20.00     Pack years: 40.00     Types: Cigarettes     Quit date: 2015     Years since quittin.3   • Smokeless tobacco: Never   Vaping Use   • Vaping Use: Former   Substance and Sexual Activity   • Alcohol use: Never   • Drug use: Never   • Sexual activity: Defer     Birth control/protection: None       Allergies  Patient has no known allergies.    Current Medication List    Current Outpatient Medications:   •  Biotin 10 MG capsule, Take 10 mg by mouth Daily., Disp: , Rfl:   •  buPROPion SR (Wellbutrin SR) 200 MG 12 hr tablet, Take 1 tablet(s) by mouth 2 times daily, Disp: 180 tablet, Rfl: 2  •  cyclobenzaprine (FEXMID) 7.5 MG tablet, Take 1 tablet by mouth 2 (Two) Times a Day As Needed., Disp: , Rfl:   •  estrogen, conjugated,-medroxyprogesterone (Prempro) 0.3-1.5 MG per tablet, Take 1 tablet by mouth daily., Disp: 28 tablet, Rfl: 3  •  gabapentin (Neurontin) 600 MG tablet, Take 1 tablet by mouth three times daily, Disp: 90 tablet, Rfl: 2  •  hydrOXYzine (ATARAX) 50 MG tablet, Take 1 tablet by mouth twice daily, Disp: 180 tablet, Rfl: 2  •  Insulin Pen Needle (Pen Needles) 32G X 4 MM misc, Use to inject Saxenda once daily., Disp: 100 each, Rfl: 0  •  levothyroxine (Levoxyl) 112 MCG tablet, Take 1 tablet by mouth daily, Disp: 90 tablet, Rfl: 2  •  Liraglutide (SAXENDA) 18 MG/3ML injection pen, Inject 3 mg under the skin into the appropriate area as directed Daily., Disp: 15 mL, Rfl: 0  •  meloxicam (MOBIC) 15 MG tablet, Take 1 tabet by mouth daily  as needed for pain, Disp: 90 tablet, Rfl: 2  •  montelukast (SINGULAIR) 10 MG tablet, Take 1 tablet by mouth each evening, Disp: 90 tablet, Rfl: 2  •  nitrofurantoin, macrocrystal-monohydrate, (Macrobid) 100 MG capsule, Take 1 capsule by mouth 2 (Two) Times a Day., Disp: 14 capsule, Rfl: 0  •  Omega-3 Fatty Acids (fish oil) 1000 MG capsule capsule, Take 1 capsule by mouth 2 (Two) Times a Day., Disp: , Rfl:   •   oxybutynin (DITROPAN) 5 MG tablet, Take 1 tablet by mouth twice daily, Disp: 180 tablet, Rfl: 2  •  SUMAtriptan (Imitrex) 25 MG tablet, Take 1 tablet by mouth as needed for migraine, may repeat every 2 hours as needed (max 200 mg in 24 hrs)., Disp: 27 tablet, Rfl: 2    Drug Interactions  Wegovy + Prempro -  may diminish the therapeutic effect of Antidiabetic Agents.  Wegovy + Levothyroxine- Wegovy can increase serum concentration of Levothyroxine    Relevant Laboratory Values  Lab Results   Component Value Date    CHOL 171 11/07/2022    TRIG 119 11/07/2022    HDL 50 11/07/2022     11/07/2022     There is no height or weight on file to calculate BMI.    Vaccinations:   Patient recommended to keep up with routine vaccinations.     Goals of Therapy  • Clinical Goals or Therapeutic Targets: 10% weight loss goal      Date 11/11/22 12/9/22 1/5/23     Weight (lb) 206 lbs 204.4 lbs 197.8 lb     BMI kg/ 32.75 32.5 31.45     Waist Circumference (in)  45 in 47 in 46\"  * changed to wegovy         Medication Assessment & Plan    Patient's current BMI is 31.45, which is considered Class I Obesity. She has lost 8.2 lbs since last visit. Congratulated patient on her success thus far!     Will stop Saxenda and order Wegovy 0.25 mg weekly. Clinical shared decision making was used to decide starting dose of Wegovy for patient.. Patient does report some intolerances with Saxenda and prefers to have a weaker appetite suppressant at a lower dose versus more risk of potential increase in GI side effects starting at a higher dose of the Wegovy.     The following medications will need dose adjustments/closer monitoring once the GLP1 is started: Thyroid Panel. Increase monitoring for clinical response to levothyroxine via laboratory results.      Discussed possible increase risk of hypoglycemic episodes. Patient reports that she occasionally experiences episodes of hypoglycemia prior to starting Saxenda. Instructed the patient to  watch for signs/symptoms of low blood glucose. She denies any issues since starting therapy.     Encouraged patient to closely monitor BP and if it remains elevated to reach out to PCP to notify and receive further instruction.     Have placed orders for follow-up lab work: CMP, Thyroid Panel, Lipid Panel, Hgb A1C    Discussed lifestyle modifications, including diet and exercise. Patient education and injection training provided.     Worked with patient to create SMART Goal(s):   1. Increase water intake to 40 oz of water a day   2. Continue to walk 30 minutes 3 times a week    Will follow-up with patient in 4 weeks, or sooner if needed.     Diane Reagan, PharmD  1/5/2023  15:38 EST

## 2023-01-19 ENCOUNTER — DISEASE STATE MANAGEMENT VISIT (OUTPATIENT)
Dept: PHARMACY | Facility: HOSPITAL | Age: 53
End: 2023-01-19
Payer: COMMERCIAL

## 2023-01-19 VITALS
WEIGHT: 196.2 LBS | DIASTOLIC BLOOD PRESSURE: 92 MMHG | HEART RATE: 46 BPM | HEIGHT: 67 IN | BODY MASS INDEX: 30.79 KG/M2 | SYSTOLIC BLOOD PRESSURE: 135 MMHG

## 2023-01-19 RX ORDER — LIRAGLUTIDE 6 MG/ML
3 INJECTION, SOLUTION SUBCUTANEOUS DAILY
Qty: 15 ML | Refills: 0 | Status: SHIPPED | OUTPATIENT
Start: 2023-01-19 | End: 2023-03-09

## 2023-01-19 NOTE — PROGRESS NOTES
Medication Management Clinic  Weight Management Program        Karla Connell is a 52 y.o. female referred to the Medication Management Clinic by Robert Myles for clinical pharmacy and specialty pharmacy management of [facility]. Karla Connell has previously tried walking, eating healthy, and lifestyle changes for weight loss.  Current weight loss efforts include walking 2-4 times a week 30-45 minutes. Patient has lost ~100 lbs over the years by walking. Patient reports having a back injury that has prevented her from walking as much as she previously did but has been working up to walking more. The patient denies a personal history or family history of thyroid cancer, denies a personal history of pancreatitis, and denies a diagnosis of gastroparesis. Patient had her thyroid removed in 2007. Pt BP elevated today. She reports she has been running around and its been a really busy day in [facility] and is about to get off work and that BP has not been running this high.     She denies missing doses of Saxenda and denies any trouble giving the injections. She reports being on the 3 mg dose of Saxenda for ~ 3 weeks.  She reported experiencing nausea when first starting Saxenda, but that has since resolved. Pt does report some constipation but has not tried anything to help alleviate it. She denies any episodes of hypoglycemia and is aware of signs/symptoms.  She has made progress towards her SMART goals. She reports walking at least 3 times per week and has exceeded her water intake goals.  Patient denies any lumps/swelling/hoarsness of throat/neck.   1/19/23: Pt reported she is tolerating Saxenda well currently. Still denies any lumps/swelling/hoarsness of throat/neck.     Relevant Past Medical History and Co-morbidities  Past Medical History:   Diagnosis Date   • Anxiety    • Arthritis    • Back problem    • Bladder disorder    • Bradycardia    • Bulging lumbar disc    • Disease of thyroid gland    •  Headache    • Hx of migraines      Social History     Socioeconomic History   • Marital status: Single   Tobacco Use   • Smoking status: Former     Packs/day: 2.00     Years: 20.00     Pack years: 40.00     Types: Cigarettes     Quit date: 2015     Years since quittin.4   • Smokeless tobacco: Never   Vaping Use   • Vaping Use: Former   Substance and Sexual Activity   • Alcohol use: Never   • Drug use: Never   • Sexual activity: Defer     Birth control/protection: None       Allergies  Patient has no known allergies.    Current Medication List    Current Outpatient Medications:   •  Biotin 10 MG capsule, Take 10 mg by mouth Daily., Disp: , Rfl:   •  buPROPion SR (Wellbutrin SR) 200 MG 12 hr tablet, Take 1 tablet(s) by mouth 2 times daily, Disp: 180 tablet, Rfl: 2  •  buPROPion SR (Wellbutrin SR) 200 MG 12 hr tablet, Take 1 tablet(s) by mouth 2 times daily, Disp: 180 tablet, Rfl: 2  •  cyclobenzaprine (FEXMID) 7.5 MG tablet, Take 1 tablet by mouth 2 (Two) Times a Day As Needed., Disp: , Rfl:   •  cyclobenzaprine (FEXMID) 7.5 MG tablet, Take 1 tablet (7.5 mg) by mouth twice per day as needed., Disp: 20 tablet, Rfl: 2  •  estrogen, conjugated,-medroxyprogesterone (Prempro) 0.3-1.5 MG per tablet, Take 1 tablet by mouth daily., Disp: 28 tablet, Rfl: 3  •  gabapentin (Neurontin) 600 MG tablet, Take 1 tablet by mouth 3 (Three) Times a Day., Disp: 90 tablet, Rfl: 2  •  hydrOXYzine (ATARAX) 50 MG tablet, Take 1 tablet by mouth twice daily, Disp: 180 tablet, Rfl: 2  •  hydrOXYzine (ATARAX) 50 MG tablet, Take 1 tablet by mouth 2 (Two) Times a Day., Disp: 180 tablet, Rfl: 2  •  Insulin Pen Needle (Pen Needles) 32G X 4 MM misc, Use to inject Saxenda once daily., Disp: 100 each, Rfl: 0  •  levothyroxine (Levoxyl) 112 MCG tablet, Take 1 tablet by mouth daily, Disp: 90 tablet, Rfl: 2  •  levothyroxine (Levoxyl) 112 MCG tablet, Take 1 tablet(s) by mouth daily, Disp: 90 tablet, Rfl: 2  •  meloxicam (MOBIC) 15 MG tablet, Take  1 tabet by mouth daily  as needed for pain, Disp: 90 tablet, Rfl: 2  •  meloxicam (MOBIC) 15 MG tablet, Take 1 tablet by mouth daily as needed for pain, Disp: 90 tablet, Rfl: 2  •  montelukast (SINGULAIR) 10 MG tablet, Take 1 tablet by mouth each evening, Disp: 90 tablet, Rfl: 2  •  montelukast (SINGULAIR) 10 MG tablet, TAKE 1 TABLET BY MOUTH EACH EVENING., Disp: 90 tablet, Rfl: 2  •  nitrofurantoin, macrocrystal-monohydrate, (Macrobid) 100 MG capsule, Take 1 capsule by mouth 2 (Two) Times a Day., Disp: 14 capsule, Rfl: 0  •  Omega-3 Fatty Acids (fish oil) 1000 MG capsule capsule, Take 1 capsule by mouth 2 (Two) Times a Day., Disp: , Rfl:   •  oxybutynin (DITROPAN) 5 MG tablet, Take 1 tablet by mouth twice daily, Disp: 180 tablet, Rfl: 2  •  oxybutynin (DITROPAN) 5 MG tablet, Take 1 tablet by mouth 2 times daily., Disp: 180 tablet, Rfl: 2  •  Semaglutide-Weight Management (Wegovy) 0.25 MG/0.5ML solution auto-injector, Inject 0.25 mg under the skin into the appropriate area as directed 1 (One) Time Per Week., Disp: 2 mL, Rfl: 0  •  SUMAtriptan (Imitrex) 25 MG tablet, Take 1 tablet by mouth as needed for migraine, may repeat every 2 hours as needed (max 200 mg in 24 hrs)., Disp: 27 tablet, Rfl: 2  •  SUMAtriptan (Imitrex) 25 MG tablet, Take 1 tablet by mouth  as needed for migraine, may repeat every 2 hours as needed., Disp: 27 tablet, Rfl: 2    Drug Interactions  Wegovy + Prempro -  may diminish the therapeutic effect of Antidiabetic Agents.  Wegovy + Levothyroxine- Wegovy can increase serum concentration of Levothyroxine    Relevant Laboratory Values  Lab Results   Component Value Date    CHOL 171 11/07/2022    TRIG 119 11/07/2022    HDL 50 11/07/2022     11/07/2022     There is no height or weight on file to calculate BMI.    Vaccinations:   Patient recommended to keep up with routine vaccinations.     Goals of Therapy  • Clinical Goals or Therapeutic Targets: 10% weight loss goal      Date 11/11/22 12/9/22  "1/5/23 1/19/23    Weight (lb) 206 lbs 204.4 lbs 197.8 lb 196.2 lb    BMI kg/ 32.75 32.5 31.45 31.19    Waist Circumference (in)  45 in 47 in 46\"   46.5\"  *changing to Wegovy when PA goes through        Medication Assessment & Plan    Patient's current BMI is 31.45, which is considered Class I Obesity. She has lost 8.2 lbs since last visit. Congratulated patient on her success thus far!     Will stop Saxenda and order Wegovy 0.25 mg weekly. Clinical shared decision making was used to decide starting dose of Wegovy for patient.. Patient does report some intolerances with Saxenda and prefers to have a weaker appetite suppressant at a lower dose versus more risk of potential increase in GI side effects starting at a higher dose of the Wegovy.     The following medications will need dose adjustments/closer monitoring once the GLP1 is started: Thyroid Panel. Increase monitoring for clinical response to levothyroxine via laboratory results.      Discussed possible increase risk of hypoglycemic episodes. Patient reports that she occasionally experiences episodes of hypoglycemia prior to starting Saxenda. Instructed the patient to watch for signs/symptoms of low blood glucose. She denies any issues since starting therapy.     Encouraged patient to closely monitor BP and if it remains elevated to reach out to PCP to notify and receive further instruction.     Have placed orders for follow-up lab work: CMP, Thyroid Panel, Lipid Panel, Hgb A1C    1/19/23 Update: Patients Wegovy Rx is still pending appeal. Will re-order Saxenda so patient is not having lapse in therapy until appeal is processed. Patient is aware that she should not take Saxenda and Wegovy together.     Discussed lifestyle modifications, including diet and exercise. Patient education and injection training provided.     Worked with patient to create SMART Goal(s):   1. Increase water intake to 40 oz of water a day   2. Continue to walk 30 minutes 3 times a " week    Will follow-up with patient in 4 weeks, or sooner if needed.     Treasure Mcgarry, PharmD  1/19/2023  11:05 EST

## 2023-01-31 ENCOUNTER — SPECIALTY PHARMACY (OUTPATIENT)
Dept: PHARMACY | Facility: HOSPITAL | Age: 53
End: 2023-01-31

## 2023-02-09 ENCOUNTER — DISEASE STATE MANAGEMENT VISIT (OUTPATIENT)
Dept: PHARMACY | Facility: HOSPITAL | Age: 53
End: 2023-02-09
Payer: COMMERCIAL

## 2023-02-09 VITALS
WEIGHT: 194.8 LBS | HEIGHT: 67 IN | BODY MASS INDEX: 30.57 KG/M2 | SYSTOLIC BLOOD PRESSURE: 125 MMHG | HEART RATE: 53 BPM | DIASTOLIC BLOOD PRESSURE: 77 MMHG

## 2023-02-09 NOTE — PROGRESS NOTES
" Medication Management Clinic  Weight Management Program        Karla Connell is a 52 y.o. female referred to the Medication Management Clinic by Robert Myles for clinical pharmacy and specialty pharmacy management of [facility] for weight management. Karla Connell has previously tried walking, eating healthy, and lifestyle changes for weight loss.  Current weight loss efforts include walking 2-4 times a week 30-45 minutes. Patient has lost ~100 lbs over the years by walking. Patient reports having a back injury that has prevented her from walking as much as she previously did but has been working up to walking more. The patient denies a personal history or family history of thyroid cancer, denies a personal history of pancreatitis, and denies a diagnosis of gastroparesis. Patient had her thyroid removed in 2007. Patient denies any lumps/swelling/hoarsness of throat/neck.     She denies missing doses of Saxenda and denies any trouble giving the injections. Pt denies any side effects as of late even nausea.  She denies any episodes of hypoglycemia and is aware of signs/symptoms.  She has made progress towards her SMART goals. She reports she has increased her walking goal to 4x/week  and has exceeded her water intake goals.  Pt was happy with weight loss this week as she reports she has had some \"splurge\" days a little more than usual. Pt was supposed to start on Wegovy after last visit however she reported today that she has never started because the prescription has not been approved.       Relevant Past Medical History and Co-morbidities  Past Medical History:   Diagnosis Date   • Anxiety    • Arthritis    • Back problem    • Bladder disorder    • Bradycardia    • Bulging lumbar disc    • Disease of thyroid gland    • Headache    • Hx of migraines      Social History     Socioeconomic History   • Marital status: Single   Tobacco Use   • Smoking status: Former     Packs/day: 2.00     Years: 20.00     Pack years: " 40.00     Types: Cigarettes     Quit date: 2015     Years since quittin.4   • Smokeless tobacco: Never   Vaping Use   • Vaping Use: Former   Substance and Sexual Activity   • Alcohol use: Never   • Drug use: Never   • Sexual activity: Defer     Birth control/protection: None       Allergies  Patient has no known allergies.    Current Medication List    Current Outpatient Medications:   •  Biotin 10 MG capsule, Take 10 mg by mouth Daily., Disp: , Rfl:   •  buPROPion SR (Wellbutrin SR) 200 MG 12 hr tablet, Take 1 tablet(s) by mouth 2 times daily, Disp: 180 tablet, Rfl: 2  •  buPROPion SR (Wellbutrin SR) 200 MG 12 hr tablet, Take 1 tablet(s) by mouth 2 times daily, Disp: 180 tablet, Rfl: 2  •  cyclobenzaprine (FEXMID) 7.5 MG tablet, Take 1 tablet by mouth 2 (Two) Times a Day As Needed., Disp: , Rfl:   •  cyclobenzaprine (FEXMID) 7.5 MG tablet, Take 1 tablet (7.5 mg) by mouth twice per day as needed., Disp: 20 tablet, Rfl: 2  •  estrogen, conjugated,-medroxyprogesterone (Prempro) 0.3-1.5 MG per tablet, Take 1 tablet by mouth daily., Disp: 28 tablet, Rfl: 3  •  gabapentin (Neurontin) 600 MG tablet, Take 1 tablet by mouth 3 (Three) Times a Day., Disp: 90 tablet, Rfl: 2  •  hydrOXYzine (ATARAX) 50 MG tablet, Take 1 tablet by mouth twice daily, Disp: 180 tablet, Rfl: 2  •  hydrOXYzine (ATARAX) 50 MG tablet, Take 1 tablet by mouth 2 (Two) Times a Day., Disp: 180 tablet, Rfl: 2  •  Insulin Pen Needle (Pen Needles) 32G X 4 MM misc, Use to inject Saxenda once daily., Disp: 100 each, Rfl: 0  •  levothyroxine (Levoxyl) 112 MCG tablet, Take 1 tablet by mouth daily, Disp: 90 tablet, Rfl: 2  •  levothyroxine (Levoxyl) 112 MCG tablet, Take 1 tablet(s) by mouth daily, Disp: 90 tablet, Rfl: 2  •  Liraglutide (Saxenda) 18 MG/3ML injection pen, Inject 3 mg under the skin into the appropriate area as directed Daily., Disp: 15 mL, Rfl: 0  •  meloxicam (MOBIC) 15 MG tablet, Take 1 tabet by mouth daily  as needed for pain, Disp: 90  tablet, Rfl: 2  •  meloxicam (MOBIC) 15 MG tablet, Take 1 tablet by mouth daily as needed for pain, Disp: 90 tablet, Rfl: 2  •  montelukast (SINGULAIR) 10 MG tablet, Take 1 tablet by mouth each evening, Disp: 90 tablet, Rfl: 2  •  montelukast (SINGULAIR) 10 MG tablet, TAKE 1 TABLET BY MOUTH EACH EVENING., Disp: 90 tablet, Rfl: 2  •  nitrofurantoin, macrocrystal-monohydrate, (Macrobid) 100 MG capsule, Take 1 capsule by mouth 2 (Two) Times a Day., Disp: 14 capsule, Rfl: 0  •  Omega-3 Fatty Acids (fish oil) 1000 MG capsule capsule, Take 1 capsule by mouth 2 (Two) Times a Day., Disp: , Rfl:   •  oxybutynin (DITROPAN) 5 MG tablet, Take 1 tablet by mouth twice daily, Disp: 180 tablet, Rfl: 2  •  oxybutynin (DITROPAN) 5 MG tablet, Take 1 tablet by mouth 2 times daily., Disp: 180 tablet, Rfl: 2  •  Semaglutide-Weight Management (Wegovy) 0.25 MG/0.5ML solution auto-injector, Inject 0.25 mg under the skin into the appropriate area as directed 1 (One) Time Per Week., Disp: 2 mL, Rfl: 0  •  SUMAtriptan (Imitrex) 25 MG tablet, Take 1 tablet by mouth as needed for migraine, may repeat every 2 hours as needed (max 200 mg in 24 hrs)., Disp: 27 tablet, Rfl: 2  •  SUMAtriptan (Imitrex) 25 MG tablet, Take 1 tablet by mouth  as needed for migraine, may repeat every 2 hours as needed., Disp: 27 tablet, Rfl: 2    Drug Interactions  Wegovy + Prempro -  may diminish the therapeutic effect of Antidiabetic Agents.  Wegovy + Levothyroxine- Wegovy can increase serum concentration of Levothyroxine    Relevant Laboratory Values  Lab Results   Component Value Date    CHOL 171 11/07/2022    TRIG 119 11/07/2022    HDL 50 11/07/2022     11/07/2022     Body mass index is 30.97 kg/m².    Vaccinations:   Patient recommended to keep up with routine vaccinations.     Goals of Therapy  • Clinical Goals or Therapeutic Targets: 10% weight loss goal      Date 11/11/22 12/9/22 1/5/23 1/19/23 2/9/23   Weight (lb) 206 lbs 204.4 lbs 197.8 lb 196.2 lb  "194.8 lb   BMI kg/ 32.75 32.5 31.45 31.19 30.97   Waist Circumference (in)  45 in 47 in 46\"   46.5\"  *changing to Wegovy when PA goes through 45\"       Medication Assessment & Plan    Patient's current BMI is 31.45, which is considered Class I Obesity. She has lost 11.2 lbs since last visit. Congratulated patient on her success thus far!     Will continue Saxenda at 3 mg dose and leave  Wegovy 0.25 mg weekly ordered as well with hopes that new PA submitted today will be responded to before patient is out of Saxenda that she currently has. During visit prior to today, Clinical shared decision making had been used to decide starting dose of Wegovy for patient.. Patient had reported some intolerances with Saxenda and prefers to have a weaker appetite suppressant at a lower dose versus more risk of potential increase in GI side effects starting at a higher dose of the Wegovy. Unfortunately, the patients Wegovy required an appeal to be submitted and a response was never received. A new PA has been submitted by [facility] today.     The following medications will need dose adjustments/closer monitoring once the GLP1 is started: Thyroid Panel. Increase monitoring for clinical response to levothyroxine via laboratory results.      Discussed possible increase risk of hypoglycemic episodes. Patient reports that she occasionally experiences episodes of hypoglycemia prior to starting Saxenda. Instructed the patient to watch for signs/symptoms of low blood glucose. She denies any issues since starting therapy.       Have placed orders for follow-up lab work: CMP, Thyroid Panel, Lipid Panel, Hgb A1C.  Reminded pt of lab work and she  reported that she will get lab work completed tomorrow.     Pt was also advised to get a new referring provider before next appointment.     Discussed lifestyle modifications, including diet and exercise. Patient education and injection training provided.     Worked with patient to create SMART Goal(s): "  Continue to target these goals.   1. Increase water intake to 40 oz of water a day   2. Continue to walk 30 minutes 4 times a week    Will follow-up with patient in 4 weeks, or sooner if needed.     Treasure Mcgarry, PharmD  2/9/2023  13:13 EST

## 2023-02-10 ENCOUNTER — LAB (OUTPATIENT)
Dept: LAB | Facility: HOSPITAL | Age: 53
End: 2023-02-10
Payer: COMMERCIAL

## 2023-02-10 DIAGNOSIS — E66.9 OBESITY (BMI 30.0-34.9): ICD-10-CM

## 2023-02-10 LAB
ALBUMIN SERPL-MCNC: 4.3 G/DL (ref 3.5–5.2)
ALBUMIN/GLOB SERPL: 2 G/DL
ALP SERPL-CCNC: 72 U/L (ref 39–117)
ALT SERPL W P-5'-P-CCNC: 14 U/L (ref 1–33)
ANION GAP SERPL CALCULATED.3IONS-SCNC: 9 MMOL/L (ref 5–15)
AST SERPL-CCNC: 24 U/L (ref 1–32)
BILIRUB SERPL-MCNC: 0.3 MG/DL (ref 0–1.2)
BUN SERPL-MCNC: 8 MG/DL (ref 6–20)
BUN/CREAT SERPL: 7.8 (ref 7–25)
CALCIUM SPEC-SCNC: 9.1 MG/DL (ref 8.6–10.5)
CHLORIDE SERPL-SCNC: 106 MMOL/L (ref 98–107)
CHOLEST SERPL-MCNC: 183 MG/DL (ref 0–200)
CO2 SERPL-SCNC: 25 MMOL/L (ref 22–29)
CREAT SERPL-MCNC: 1.02 MG/DL (ref 0.57–1)
EGFRCR SERPLBLD CKD-EPI 2021: 65.9 ML/MIN/1.73
GLOBULIN UR ELPH-MCNC: 2.1 GM/DL
GLUCOSE SERPL-MCNC: 90 MG/DL (ref 65–99)
HBA1C MFR BLD: 5.3 % (ref 4.8–5.6)
HDLC SERPL-MCNC: 54 MG/DL (ref 40–60)
LDLC SERPL CALC-MCNC: 106 MG/DL (ref 0–100)
LDLC/HDLC SERPL: 1.9 {RATIO}
POTASSIUM SERPL-SCNC: 4.4 MMOL/L (ref 3.5–5.2)
PROT SERPL-MCNC: 6.4 G/DL (ref 6–8.5)
SODIUM SERPL-SCNC: 140 MMOL/L (ref 136–145)
T-UPTAKE NFR SERPL: 1.18 TBI (ref 0.8–1.3)
T4 SERPL-MCNC: 11.8 MCG/DL (ref 4.5–11.7)
TRIGL SERPL-MCNC: 133 MG/DL (ref 0–150)
TSH SERPL DL<=0.05 MIU/L-ACNC: 0.28 UIU/ML (ref 0.27–4.2)
VLDLC SERPL-MCNC: 23 MG/DL (ref 5–40)

## 2023-02-10 PROCEDURE — 84436 ASSAY OF TOTAL THYROXINE: CPT

## 2023-02-10 PROCEDURE — 36415 COLL VENOUS BLD VENIPUNCTURE: CPT

## 2023-02-10 PROCEDURE — 80061 LIPID PANEL: CPT

## 2023-02-10 PROCEDURE — 84479 ASSAY OF THYROID (T3 OR T4): CPT

## 2023-02-10 PROCEDURE — 84443 ASSAY THYROID STIM HORMONE: CPT

## 2023-02-10 PROCEDURE — 80053 COMPREHEN METABOLIC PANEL: CPT

## 2023-02-10 PROCEDURE — 83036 HEMOGLOBIN GLYCOSYLATED A1C: CPT

## 2023-02-13 ENCOUNTER — OFFICE VISIT (OUTPATIENT)
Dept: FAMILY MEDICINE CLINIC | Facility: CLINIC | Age: 53
End: 2023-02-13
Payer: COMMERCIAL

## 2023-02-13 DIAGNOSIS — E66.09 CLASS 1 OBESITY DUE TO EXCESS CALORIES WITHOUT SERIOUS COMORBIDITY WITH BODY MASS INDEX (BMI) OF 30.0 TO 30.9 IN ADULT: ICD-10-CM

## 2023-02-13 DIAGNOSIS — Z12.2 ENCOUNTER FOR SCREENING FOR LUNG CANCER: Primary | ICD-10-CM

## 2023-02-13 PROCEDURE — 99214 OFFICE O/P EST MOD 30 MIN: CPT | Performed by: FAMILY MEDICINE

## 2023-02-13 NOTE — PROGRESS NOTES
Chief Complaint  Obesity    Subjective          Karlasa Connell presents to [facility]  History of Present Illness    Patient presents to the clinic today with interest in the [facility] weight loss program, she is currently using the program and doing well with saxenda. Patient denies personal history of pancreatitis and family history of medullary thyroid cancer.  Details regarding the process of the program discussed with patient.  Offered education on weight loss medications.  Patient voiced understanding.  Referral sent to specialty pharmacy for PA. Labs reviewed with patient. Patient had no further questions or concerns.    Review of Systems      Objective   Vital Signs:   There were no vitals taken for this visit.    Physical Exam  Constitutional:       General: She is not in acute distress.     Appearance: Normal appearance. She is well-developed and well-groomed. She is not ill-appearing, toxic-appearing or diaphoretic.   HENT:      Head: Normocephalic.      Right Ear: Tympanic membrane, ear canal and external ear normal.      Left Ear: Tympanic membrane, ear canal and external ear normal.      Nose: Nose normal. No congestion or rhinorrhea.      Mouth/Throat:      Mouth: Mucous membranes are moist.      Pharynx: Oropharynx is clear. No oropharyngeal exudate or posterior oropharyngeal erythema.   Eyes:      General: Lids are normal.         Right eye: No discharge.         Left eye: No discharge.      Extraocular Movements: Extraocular movements intact.      Pupils: Pupils are equal, round, and reactive to light.   Neck:      Vascular: No carotid bruit.   Cardiovascular:      Rate and Rhythm: Normal rate and regular rhythm.      Pulses: Normal pulses.      Heart sounds: Normal heart sounds. No murmur heard.    No friction rub. No gallop.   Pulmonary:      Effort: Pulmonary effort is normal. No respiratory distress.      Breath sounds: Normal breath sounds. No stridor. No wheezing,  rhonchi or rales.   Chest:      Chest wall: No tenderness.   Abdominal:      General: Bowel sounds are normal. There is no distension.      Palpations: Abdomen is soft. There is no mass.      Tenderness: There is no abdominal tenderness. There is no right CVA tenderness, left CVA tenderness, guarding or rebound.      Hernia: No hernia is present.   Musculoskeletal:         General: No swelling or tenderness. Normal range of motion.      Cervical back: Normal range of motion and neck supple. No rigidity or tenderness.      Right lower leg: No edema.      Left lower leg: No edema.   Lymphadenopathy:      Cervical: No cervical adenopathy.   Skin:     General: Skin is warm.      Capillary Refill: Capillary refill takes less than 2 seconds.      Coloration: Skin is not jaundiced.      Findings: No bruising, erythema or rash.   Neurological:      General: No focal deficit present.      Mental Status: She is alert and oriented to person, place, and time.      Motor: Motor function is intact. No weakness.      Coordination: Coordination is intact.      Gait: Gait is intact. Gait normal.   Psychiatric:         Attention and Perception: Attention normal.         Mood and Affect: Mood normal.         Speech: Speech normal.         Behavior: Behavior normal.         Cognition and Memory: Cognition normal.         Judgment: Judgment normal.        Result Review :                 Assessment and Plan    Diagnoses and all orders for this visit:    1. Encounter for screening for lung cancer (Primary)  -     CT Chest Low Dose Wo; Future    2. Class 1 obesity due to excess calories without serious comorbidity with body mass index (BMI) of 30.0 to 30.9 in adult  Comments:  referral for weight loss clinic faxed      Patient's There is no height or weight on file to calculate BMI. indicating that she is could not have BMI calculated because patient was not weighed for this visit .    Follow Up   Return in about 3 months (around  5/13/2023), or if symptoms worsen or fail to improve.  Patient was given instructions and counseling regarding her condition or for health maintenance advice. Please see specific information pulled into the AVS if appropriate.     This document has been electronically signed by CATE Boston  February 13, 2023 14:25 EST

## 2023-03-09 ENCOUNTER — DISEASE STATE MANAGEMENT VISIT (OUTPATIENT)
Dept: PHARMACY | Facility: HOSPITAL | Age: 53
End: 2023-03-09
Payer: COMMERCIAL

## 2023-03-09 VITALS
BODY MASS INDEX: 30.4 KG/M2 | WEIGHT: 191.2 LBS | DIASTOLIC BLOOD PRESSURE: 72 MMHG | HEART RATE: 46 BPM | SYSTOLIC BLOOD PRESSURE: 124 MMHG

## 2023-03-09 RX ORDER — SEMAGLUTIDE 0.5 MG/.5ML
0.5 INJECTION, SOLUTION SUBCUTANEOUS WEEKLY
Qty: 2 ML | Refills: 0 | Status: SHIPPED | OUTPATIENT
Start: 2023-03-09 | End: 2023-04-06

## 2023-03-09 NOTE — PROGRESS NOTES
[facility]  Weight Management Program        Karla Connell is a 52 y.o. female referred to the [facility] originally by Robert Myles, but now is being referred by Tiffani Quezada (3/9/23) for clinical pharmacy and specialty pharmacy management of Wegovy for weight management. Karla Connell has previously tried walking, eating healthy, and lifestyle changes for weight loss.  Current weight loss efforts include walking 2-4 times a week 30-45 minutes and Wegovy 0.25mg. Patient has lost ~100 lbs over the years by walking. Patient reports having a back injury that has prevented her from walking as much as she previously did but has been working up to walking more. The patient denies a personal history or family history of thyroid cancer, denies a personal history of pancreatitis, and denies a diagnosis of gastroparesis. Patient had her thyroid removed in 2007. Patient denies any lumps/swelling/hoarsness of throat/neck and denies any abdominal pain at this time.     Patient's PA for Wegovy was approved and she started it three weeks ago. She reports that her first injection went well and that she gave it to herself in the back on her arm. She gave the next dose the same way, but an error occurred and she thinks she pulled up with the injection on the first click and therefore all of the medication released down her arm. To compensate for that missed dose, she reports using Saxenda for the remainder of that week. She then gave the third injection in her thigh and reports having no issues with it. She will give her last dose on 0.25mg on this Sunday. She denies any side effects from the change to Wegovy and reports a continued sensation of fullness even with this reduced dose. She reports that she is meeting her water intake goal, as well as her walking goal.       Relevant Past Medical History and Co-morbidities  Past Medical History:   Diagnosis Date   • Anxiety    • Arthritis     • Back problem    • Bladder disorder    • Bradycardia    • Bulging lumbar disc    • Disease of thyroid gland    • Headache    • Hx of migraines    • Neuromuscular disorder (HCC)      Social History     Socioeconomic History   • Marital status: Single   Tobacco Use   • Smoking status: Former     Packs/day: 2.00     Years: 20.00     Pack years: 40.00     Types: Cigarettes     Quit date: 2015     Years since quittin.5   • Smokeless tobacco: Never   Vaping Use   • Vaping Use: Former   Substance and Sexual Activity   • Alcohol use: Never   • Drug use: Never   • Sexual activity: Yes     Partners: Male     Birth control/protection: Other, None       Allergies  Patient has no known allergies.    Current Medication List    Current Outpatient Medications:   •  Biotin 10 MG capsule, Take 10 mg by mouth Daily., Disp: , Rfl:   •  buPROPion SR (Wellbutrin SR) 200 MG 12 hr tablet, Take 1 tablet(s) by mouth 2 times daily, Disp: 180 tablet, Rfl: 2  •  buPROPion SR (Wellbutrin SR) 200 MG 12 hr tablet, Take 1 tablet(s) by mouth 2 times daily, Disp: 180 tablet, Rfl: 2  •  cyclobenzaprine (FEXMID) 7.5 MG tablet, Take 1 tablet by mouth 2 (Two) Times a Day As Needed., Disp: , Rfl:   •  cyclobenzaprine (FEXMID) 7.5 MG tablet, Take 1 tablet (7.5 mg) by mouth twice per day as needed., Disp: 20 tablet, Rfl: 2  •  estrogen, conjugated,-medroxyprogesterone (Prempro) 0.3-1.5 MG per tablet, Take 1 tablet by mouth daily., Disp: 28 tablet, Rfl: 3  •  gabapentin (Neurontin) 600 MG tablet, Take 1 tablet by mouth 3 (Three) Times a Day., Disp: 90 tablet, Rfl: 2  •  hydrOXYzine (ATARAX) 50 MG tablet, Take 1 tablet by mouth twice daily, Disp: 180 tablet, Rfl: 2  •  hydrOXYzine (ATARAX) 50 MG tablet, Take 1 tablet by mouth 2 (Two) Times a Day., Disp: 180 tablet, Rfl: 2  •  Insulin Pen Needle (Pen Needles) 32G X 4 MM misc, Use to inject Saxenda once daily., Disp: 100 each, Rfl: 0  •  levothyroxine (Levoxyl) 112 MCG tablet, Take 1 tablet by mouth  daily, Disp: 90 tablet, Rfl: 2  •  levothyroxine (Levoxyl) 112 MCG tablet, Take 1 tablet(s) by mouth daily, Disp: 90 tablet, Rfl: 2  •  meloxicam (MOBIC) 15 MG tablet, Take 1 tabet by mouth daily  as needed for pain, Disp: 90 tablet, Rfl: 2  •  meloxicam (MOBIC) 15 MG tablet, Take 1 tablet by mouth daily as needed for pain, Disp: 90 tablet, Rfl: 2  •  montelukast (SINGULAIR) 10 MG tablet, Take 1 tablet by mouth each evening, Disp: 90 tablet, Rfl: 2  •  montelukast (SINGULAIR) 10 MG tablet, TAKE 1 TABLET BY MOUTH EACH EVENING., Disp: 90 tablet, Rfl: 2  •  nitrofurantoin, macrocrystal-monohydrate, (Macrobid) 100 MG capsule, Take 1 capsule by mouth 2 (Two) Times a Day., Disp: 14 capsule, Rfl: 0  •  Omega-3 Fatty Acids (fish oil) 1000 MG capsule capsule, Take 1 capsule by mouth 2 (Two) Times a Day., Disp: , Rfl:   •  oxybutynin (DITROPAN) 5 MG tablet, Take 1 tablet by mouth twice daily, Disp: 180 tablet, Rfl: 2  •  oxybutynin (DITROPAN) 5 MG tablet, Take 1 tablet by mouth 2 times daily., Disp: 180 tablet, Rfl: 2  •  Semaglutide-Weight Management (Wegovy) 0.5 MG/0.5ML solution auto-injector, Inject 0.5 mL under the skin into the appropriate area as directed 1 (One) Time Per Week., Disp: 2 mL, Rfl: 0  •  SUMAtriptan (Imitrex) 25 MG tablet, Take 1 tablet by mouth as needed for migraine, may repeat every 2 hours as needed (max 200 mg in 24 hrs)., Disp: 27 tablet, Rfl: 2  •  SUMAtriptan (Imitrex) 25 MG tablet, Take 1 tablet by mouth  as needed for migraine, may repeat every 2 hours as needed., Disp: 27 tablet, Rfl: 2    Drug Interactions  Wegovy + Prempro -  may diminish the therapeutic effect of Antidiabetic Agents.  Wegovy + Levothyroxine- Wegovy can increase serum concentration of Levothyroxine    Relevant Laboratory Values  Lab Results   Component Value Date    CHOL 183 02/10/2023    TRIG 133 02/10/2023    HDL 54 02/10/2023     (H) 02/10/2023     Body mass index is 30.4 kg/m².    Vaccinations:   Patient  "recommended to keep up with routine vaccinations.     Goals of Therapy  • Clinical Goals or Therapeutic Targets: 10% weight loss goal      Date 11/11/22 12/9/22 1/5/23 1/19/23 2/9/23 3/9/23   Weight (lb) 206 lbs 204.4 lbs 197.8 lb 196.2 lb 194.8 lb 191.2lb   BMI kg/ 32.75 32.5 31.45 31.19 30.97 30.39   Waist Circumference (in)  45 in 47 in 46\"   46.5\"  *changing to Wegovy when PA goes through 45\" 42\"       Medication Assessment & Plan    Patient's current BMI is 30.39, which is considered Class I Obesity. She has lost 3.6 lbs and 3\" since last visit. Congratulated patient on her success thus far!     Will increase to Wegovy 0.5mg Sc weekly. Patient to start on next Sunday. Will give last dose of 0.25mg on this Sunday.     The following medications will need dose adjustments/closer monitoring once the GLP1 is started: Thyroid Panel. Increase monitoring for clinical response to levothyroxine via laboratory results.     Patient had a recent appointment with the new referring provider. I went over her labs with her. LDL was still slightly elevated at 106. Scr was slightly elevated at 1.06, which is within her normal trend. Her T4 was slightly elevated, but patient is also taking biotin which is known to falsely elevate T4. She reports that she has since then backed off of her biotin dose per her PCP's recommendation.      Discussed possible increase risk of hypoglycemic episodes. Patient reports that she occasionally experiences episodes of hypoglycemia prior to starting Saxenda. Instructed the patient to watch for signs/symptoms of low blood glucose. She denies any issues since starting therapy and reports that she is cautious about this and continues to closely monitor it.     Discussed lifestyle modifications, including diet and exercise. Patient education and injection training provided.     Worked with patient to create SMART Goal(s):  Continue to target these goals.   1. Increase water intake to 40 oz of water a " day   2. Continue to walk 30 minutes 4 times a week    Will follow-up with patient in 4 weeks, or sooner if needed.     Treasure Mcgarry, PharmD  3/9/2023  13:27 EST

## 2023-04-06 ENCOUNTER — DISEASE STATE MANAGEMENT VISIT (OUTPATIENT)
Dept: PHARMACY | Facility: HOSPITAL | Age: 53
End: 2023-04-06
Payer: COMMERCIAL

## 2023-04-06 VITALS
SYSTOLIC BLOOD PRESSURE: 128 MMHG | BODY MASS INDEX: 30.27 KG/M2 | HEART RATE: 52 BPM | WEIGHT: 190.4 LBS | DIASTOLIC BLOOD PRESSURE: 80 MMHG

## 2023-04-06 RX ORDER — SEMAGLUTIDE 1 MG/.5ML
1 INJECTION, SOLUTION SUBCUTANEOUS WEEKLY
Qty: 2 ML | Refills: 0 | Status: SHIPPED | OUTPATIENT
Start: 2023-04-06

## 2023-04-06 NOTE — PROGRESS NOTES
[facility] Management Clinic  Weight Management Program      Karla Connell is a 52 y.o. female referred to the [facility] Management Clinic by Tiffani Quezada (3/9/23) for clinical pharmacy and specialty pharmacy management of Wegovy for weight management. Karla Connell has previously tried walking, eating healthy, and lifestyle changes for weight loss.  Current weight loss efforts include walking 2-4 times a week 30-45 minutes and Wegovy 0.5mg. She denies any side effects, denies missing any doses and denies any trouble giving herself the injection.     Patient has lost ~100 lbs over the years by walking. Patient reports having a back injury that has prevented her from walking as much as she previously did but has been working up to walking more. The patient denies a personal history or family history of thyroid cancer, denies a personal history of pancreatitis, and denies a diagnosis of gastroparesis. Patient had her thyroid removed in . Patient denies any lumps/swelling/hoarsness of throat/neck and denies any abdominal pain at this time.     Relevant Past Medical History and Co-morbidities  Past Medical History:   Diagnosis Date   • Anxiety    • Arthritis    • Back problem    • Bladder disorder    • Bradycardia    • Bulging lumbar disc    • Disease of thyroid gland    • Headache    • Hx of migraines    • Neuromuscular disorder      Social History     Socioeconomic History   • Marital status: Single   Tobacco Use   • Smoking status: Former     Packs/day: 2.00     Years: 20.00     Pack years: 40.00     Types: Cigarettes     Quit date: 2015     Years since quittin.6   • Smokeless tobacco: Never   Vaping Use   • Vaping Use: Former   Substance and Sexual Activity   • Alcohol use: Never   • Drug use: Never   • Sexual activity: Yes     Partners: Male     Birth control/protection: Other, None       Allergies  Patient has no known allergies.    Current Medication List    Current Outpatient Medications:   •   buPROPion SR (Wellbutrin SR) 200 MG 12 hr tablet, Take 1 tablet(s) by mouth 2 times daily, Disp: 180 tablet, Rfl: 2  •  cyclobenzaprine (FEXMID) 7.5 MG tablet, Take 1 tablet (7.5 mg) by mouth twice per day as needed., Disp: 20 tablet, Rfl: 2  •  estrogen, conjugated,-medroxyprogesterone (Prempro) 0.3-1.5 MG per tablet, Take 1 tablet by mouth daily., Disp: 28 tablet, Rfl: 3  •  gabapentin (Neurontin) 600 MG tablet, Take 1 tablet by mouth 3 (Three) Times a Day., Disp: 90 tablet, Rfl: 2  •  hydrOXYzine (ATARAX) 50 MG tablet, Take 1 tablet by mouth 2 (Two) Times a Day., Disp: 180 tablet, Rfl: 2  •  levothyroxine (Levoxyl) 112 MCG tablet, Take 1 tablet(s) by mouth daily, Disp: 90 tablet, Rfl: 2  •  meloxicam (MOBIC) 15 MG tablet, Take 1 tablet by mouth daily as needed for pain, Disp: 90 tablet, Rfl: 2  •  montelukast (SINGULAIR) 10 MG tablet, TAKE 1 TABLET BY MOUTH EACH EVENING., Disp: 90 tablet, Rfl: 2  •  Omega-3 Fatty Acids (fish oil) 1000 MG capsule capsule, Take 1 capsule by mouth 2 (Two) Times a Day., Disp: , Rfl:   •  oxybutynin (DITROPAN) 5 MG tablet, Take 1 tablet by mouth 2 times daily., Disp: 180 tablet, Rfl: 2  •  SUMAtriptan (Imitrex) 25 MG tablet, Take 1 tablet by mouth  as needed for migraine, may repeat every 2 hours as needed., Disp: 27 tablet, Rfl: 2  •  Biotin 10 MG capsule, Take 10 mg by mouth Daily. (Patient not taking: Reported on 4/6/2023), Disp: , Rfl:   •  Semaglutide-Weight Management (Wegovy) 1 MG/0.5ML solution auto-injector, Inject 1 mg under the skin into the appropriate area as directed 1 (One) Time Per Week., Disp: 2 mL, Rfl: 0    Drug Interactions  Wegovy + Prempro -  may diminish the therapeutic effect of Antidiabetic Agents.  Wegovy + Levothyroxine- Wegovy can increase serum concentration of Levothyroxine    Relevant Laboratory Values  Lab Results   Component Value Date    CHOL 183 02/10/2023    TRIG 133 02/10/2023    HDL 54 02/10/2023     (H) 02/10/2023     Body mass index is  "30.27 kg/m².    Vaccinations:   Patient recommended to keep up with routine vaccinations.     Goals of Therapy  • Clinical Goals or Therapeutic Targets: 10% weight loss goal      Date 11/11/22 12/9/22 1/5/23 1/19/23 2/9/23 3/9/23 4/6/23   Weight (lb) 206 lbs 204.4 lbs 197.8 lb 196.2 lb 194.8 lb 191.2lb 190.2lb   BMI kg/ 32.75 32.5 31.45 31.19 30.97 30.39 30.27   Waist Circumference (in)  45 in 47 in 46\"   46.5\"  *changing to Wegovy when PA goes through 45\" 42\" 42\"       Medication Assessment & Plan    Patient's current BMI is 30.27, which is considered Class I Obesity. She has lost 16 lbs overall. Congratulated patient on her success thus far!     Will increase to Wegovy 1mg SC weekly.     Discussed lifestyle modifications, including diet and exercise. Patient education and injection training provided.     Worked with patient to create SMART Goal(s):  Continue to target these goals.     1. Increase water intake to 64 oz of water a day   2. Continue to walk 45 minutes 4 times a week    Will follow-up with patient in 4 weeks, or sooner if needed.     Janna Tristan, PharmD  4/6/2023  14:34 EDT  "

## 2023-04-07 ENCOUNTER — HOSPITAL ENCOUNTER (OUTPATIENT)
Dept: CT IMAGING | Facility: HOSPITAL | Age: 53
Discharge: HOME | End: 2023-04-07
Admitting: FAMILY MEDICINE
Payer: COMMERCIAL

## 2023-04-07 DIAGNOSIS — Z12.2 ENCOUNTER FOR SCREENING FOR LUNG CANCER: ICD-10-CM

## 2023-04-07 PROCEDURE — 71271 CT THORAX LUNG CANCER SCR C-: CPT | Performed by: RADIOLOGY

## 2023-04-07 PROCEDURE — 71271 CT THORAX LUNG CANCER SCR C-: CPT

## 2023-04-10 ENCOUNTER — TELEPHONE (OUTPATIENT)
Dept: FAMILY MEDICINE CLINIC | Facility: CLINIC | Age: 53
End: 2023-04-10
Payer: COMMERCIAL

## 2023-04-10 NOTE — TELEPHONE ENCOUNTER
----- Message from CATE Boston sent at 4/10/2023  8:25 AM EDT -----  Please let patient know results are normal. Thank you.       Patient notified & verbalized understanding.

## 2023-04-24 ENCOUNTER — TRANSCRIBE ORDERS (OUTPATIENT)
Dept: ADMINISTRATIVE | Facility: HOSPITAL | Age: 53
End: 2023-04-24
Payer: COMMERCIAL

## 2023-04-24 DIAGNOSIS — Z12.31 VISIT FOR SCREENING MAMMOGRAM: Primary | ICD-10-CM

## 2023-05-04 ENCOUNTER — DISEASE STATE MANAGEMENT VISIT (OUTPATIENT)
Dept: PHARMACY | Facility: HOSPITAL | Age: 53
End: 2023-05-04
Payer: COMMERCIAL

## 2023-05-04 VITALS
SYSTOLIC BLOOD PRESSURE: 119 MMHG | HEIGHT: 67 IN | DIASTOLIC BLOOD PRESSURE: 79 MMHG | BODY MASS INDEX: 28.94 KG/M2 | HEART RATE: 51 BPM | WEIGHT: 184.4 LBS

## 2023-05-04 RX ORDER — SEMAGLUTIDE 1.7 MG/.75ML
1.7 INJECTION, SOLUTION SUBCUTANEOUS WEEKLY
Qty: 3 ML | Refills: 0 | Status: SHIPPED | OUTPATIENT
Start: 2023-05-04

## 2023-05-04 NOTE — PROGRESS NOTES
[facility] Management Clinic  Weight Management Program      Karla Connell is a 52 y.o. female referred to the [facility] Management Clinic by Tiffani Quezada (3/9/23) for clinical pharmacy and specialty pharmacy management of Wegovy for weight management. Karla Connell has previously tried walking, eating healthy, and lifestyle changes for weight loss.  Current weight loss efforts include walking 2-4 times a week 30-45 minutes and Wegovy 0.5mg. She denies any side effects, denies missing any doses and denies any trouble giving herself the injection.     Patient has lost ~100 lbs over the years by walking. Patient reports having a back injury that has prevented her from walking as much as she previously did but has been working up to walking more. The patient denies a personal history or family history of thyroid cancer, denies a personal history of pancreatitis, and denies a diagnosis of gastroparesis. Patient had her thyroid removed in . Patient denies any lumps/swelling/hoarsness of throat/neck and denies any abdominal pain at this time.     Relevant Past Medical History and Co-morbidities  Past Medical History:   Diagnosis Date   • Anxiety    • Arthritis    • Back problem    • Bladder disorder    • Bradycardia    • Bulging lumbar disc    • Disease of thyroid gland    • Headache    • Hx of migraines    • Neuromuscular disorder      Social History     Socioeconomic History   • Marital status: Single   Tobacco Use   • Smoking status: Former     Packs/day: 2.00     Years: 20.00     Pack years: 40.00     Types: Cigarettes     Quit date: 2015     Years since quittin.7   • Smokeless tobacco: Never   Vaping Use   • Vaping Use: Former   Substance and Sexual Activity   • Alcohol use: Never   • Drug use: Never   • Sexual activity: Yes     Partners: Male     Birth control/protection: Other, None       Allergies  Patient has no known allergies.    Current Medication List    Current Outpatient Medications:   •   Biotin 10 MG capsule, Take 10 mg by mouth Daily. (Patient not taking: Reported on 4/6/2023), Disp: , Rfl:   •  buPROPion SR (Wellbutrin SR) 200 MG 12 hr tablet, Take 1 tablet(s) by mouth 2 times daily, Disp: 180 tablet, Rfl: 2  •  cyclobenzaprine (FEXMID) 7.5 MG tablet, Take 1 tablet (7.5 mg) by mouth twice per day as needed., Disp: 20 tablet, Rfl: 2  •  cyclobenzaprine (FEXMID) 7.5 MG tablet, Take 1 tablet by mouth 2 (Two) Times a Day., Disp: 20 tablet, Rfl: 2  •  estrogen, conjugated,-medroxyprogesterone (Prempro) 0.3-1.5 MG per tablet, Take 1 tablet by mouth daily., Disp: 28 tablet, Rfl: 3  •  gabapentin (Neurontin) 600 MG tablet, Take 1 tablet by mouth 3 (Three) Times a Day., Disp: 90 tablet, Rfl: 2  •  hydrOXYzine (ATARAX) 50 MG tablet, Take 1 tablet by mouth 2 (Two) Times a Day., Disp: 180 tablet, Rfl: 2  •  levothyroxine (Levoxyl) 112 MCG tablet, Take 1 tablet(s) by mouth daily, Disp: 90 tablet, Rfl: 2  •  montelukast (SINGULAIR) 10 MG tablet, TAKE 1 TABLET BY MOUTH EACH EVENING., Disp: 90 tablet, Rfl: 2  •  Omega-3 Fatty Acids (fish oil) 1000 MG capsule capsule, Take 1 capsule by mouth 2 (Two) Times a Day., Disp: , Rfl:   •  oxybutynin (DITROPAN) 5 MG tablet, Take 1 tablet by mouth 2 times daily., Disp: 180 tablet, Rfl: 2  •  Semaglutide-Weight Management (Wegovy) 1 MG/0.5ML solution auto-injector, Inject 1 mg under the skin into the appropriate area as directed 1 (One) Time Per Week., Disp: 2 mL, Rfl: 0  •  SUMAtriptan (Imitrex) 25 MG tablet, Take 1 tablet by mouth  as needed for migraine, may repeat every 2 hours as needed., Disp: 27 tablet, Rfl: 2    Drug Interactions  Wegovy + Prempro -  may diminish the therapeutic effect of Antidiabetic Agents.  Wegovy + Levothyroxine- Wegovy can increase serum concentration of Levothyroxine    Relevant Laboratory Values  Lab Results   Component Value Date    CHOL 183 02/10/2023    TRIG 133 02/10/2023    HDL 54 02/10/2023     (H) 02/10/2023     There is no  "height or weight on file to calculate BMI.    Vaccinations:   Patient recommended to keep up with routine vaccinations.     Goals of Therapy  • Clinical Goals or Therapeutic Targets: 10% weight loss goal      Date 11/11/22 12/9/22 1/5/23 1/19/23 2/9/23 3/9/23 4/6/23 5/4/23   Weight (lb) 206 lbs 204.4 lbs 197.8 lb 196.2 lb 194.8 lb 191.2lb 190.2lb 184.4 lb   BMI kg/ 32.75 32.5 31.45 31.19 30.97 30.39 30.27 29.32   Waist Circumference (in)  45 in 47 in 46\"   46.5\"  *changing to Wegovy when PA goes through 45\" 42\" 42\" 41\"       Medication Assessment & Plan    Patient's current BMI is 29.32, which is considered overweight. She has lost 21.6 lbs overall. Congratulated patient on her success thus far!     Will increase to Wegovy 1.7 mg SC weekly.     Discussed lifestyle modifications, including diet and exercise. Patient education and injection training provided.     Worked with patient to create SMART Goal(s):  Continue to target these goals.     1. Increase water intake to 64 oz of water a day   2. Continue to walk 45 minutes 4 times a week    Will follow-up with patient in 4 weeks, or sooner if needed.     Yumiko Marcum, PharmD  5/4/2023  14:36 EDT  "

## 2023-05-15 ENCOUNTER — OFFICE VISIT (OUTPATIENT)
Dept: FAMILY MEDICINE CLINIC | Facility: CLINIC | Age: 53
End: 2023-05-15
Payer: COMMERCIAL

## 2023-05-15 VITALS
RESPIRATION RATE: 18 BRPM | DIASTOLIC BLOOD PRESSURE: 68 MMHG | SYSTOLIC BLOOD PRESSURE: 100 MMHG | BODY MASS INDEX: 28.66 KG/M2 | OXYGEN SATURATION: 98 % | WEIGHT: 182.6 LBS | HEIGHT: 67 IN | TEMPERATURE: 97.3 F | HEART RATE: 63 BPM

## 2023-05-15 DIAGNOSIS — E66.09 CLASS 1 OBESITY DUE TO EXCESS CALORIES WITHOUT SERIOUS COMORBIDITY WITH BODY MASS INDEX (BMI) OF 30.0 TO 30.9 IN ADULT: Primary | ICD-10-CM

## 2023-05-15 PROCEDURE — 99213 OFFICE O/P EST LOW 20 MIN: CPT | Performed by: FAMILY MEDICINE

## 2023-05-15 NOTE — PROGRESS NOTES
"Chief Complaint  Follow-up (3 mo follow up)    Subjective          Karla Connell presents to [facility]  History of Present Illness  Patient here to follow up on weight loss. States she is doing well with medication a this time. Has lost 25lbs since last visit. Denies any adverse side effects. Will order repeat labs.       Review of Systems      Objective   Vital Signs:   /68 (BP Location: Right arm, Patient Position: Sitting, Cuff Size: Adult)   Pulse 63   Temp 97.3 °F (36.3 °C) (Temporal)   Resp 18   Ht 168.9 cm (66.5\")   Wt 82.8 kg (182 lb 9.6 oz)   SpO2 98%   BMI 29.03 kg/m²     Physical Exam  Constitutional:       General: She is not in acute distress.     Appearance: Normal appearance. She is well-developed and well-groomed. She is not ill-appearing, toxic-appearing or diaphoretic.   HENT:      Head: Normocephalic.      Nose: Nose normal. No congestion or rhinorrhea.      Mouth/Throat:      Mouth: Mucous membranes are moist.      Pharynx: Oropharynx is clear. No oropharyngeal exudate or posterior oropharyngeal erythema.   Eyes:      General: Lids are normal.         Right eye: No discharge.         Left eye: No discharge.      Extraocular Movements: Extraocular movements intact.      Pupils: Pupils are equal, round, and reactive to light.   Neck:      Vascular: No carotid bruit.   Cardiovascular:      Rate and Rhythm: Normal rate and regular rhythm.      Pulses: Normal pulses.      Heart sounds: Normal heart sounds. No murmur heard.    No friction rub. No gallop.   Pulmonary:      Effort: Pulmonary effort is normal. No respiratory distress.      Breath sounds: Normal breath sounds. No stridor. No wheezing, rhonchi or rales.   Chest:      Chest wall: No tenderness.   Abdominal:      General: Bowel sounds are normal. There is no distension.      Palpations: Abdomen is soft. There is no mass.      Tenderness: There is no abdominal tenderness. There is no right CVA " tenderness, left CVA tenderness, guarding or rebound.      Hernia: No hernia is present.   Musculoskeletal:         General: No swelling or tenderness. Normal range of motion.      Cervical back: Normal range of motion and neck supple. No rigidity or tenderness.      Right lower leg: No edema.      Left lower leg: No edema.   Lymphadenopathy:      Cervical: No cervical adenopathy.   Skin:     General: Skin is warm.      Capillary Refill: Capillary refill takes less than 2 seconds.      Coloration: Skin is not jaundiced.      Findings: No bruising, erythema or rash.   Neurological:      General: No focal deficit present.      Mental Status: She is alert and oriented to person, place, and time.      Motor: Motor function is intact. No weakness.      Coordination: Coordination is intact.      Gait: Gait is intact. Gait normal.   Psychiatric:         Attention and Perception: Attention normal.         Mood and Affect: Mood normal.         Speech: Speech normal.         Behavior: Behavior normal.         Cognition and Memory: Cognition normal.         Judgment: Judgment normal.        Result Review :                 Assessment and Plan    Diagnoses and all orders for this visit:    1. Class 1 obesity due to excess calories without serious comorbidity with body mass index (BMI) of 30.0 to 30.9 in adult (Primary)  -     CBC Auto Differential; Future  -     Comprehensive Metabolic Panel; Future  -     Hemoglobin A1c; Future  -     Lipid Panel; Future  -     TSH; Future  -     T4, Free; Future      Patient's Body mass index is 29.03 kg/m². indicating that she is overweight (BMI 25-29.9). Patient's (Body mass index is 29.03 kg/m².) indicates that they are overweight with health conditions that include none . Weight is improving with treatment. BMI is is above average; BMI management plan is completed. We discussed low calorie, low carb based diet program, portion control, increasing exercise and pharmacologic options  including wegovy. .    Follow Up   Return in about 3 months (around 8/15/2023), or if symptoms worsen or fail to improve.  Patient was given instructions and counseling regarding her condition or for health maintenance advice. Please see specific information pulled into the AVS if appropriate.     This document has been electronically signed by CATE Boston  May 15, 2023 14:20 EDT

## 2023-05-24 ENCOUNTER — LAB (OUTPATIENT)
Dept: LAB | Facility: HOSPITAL | Age: 53
End: 2023-05-24
Payer: COMMERCIAL

## 2023-05-24 DIAGNOSIS — E66.09 CLASS 1 OBESITY DUE TO EXCESS CALORIES WITHOUT SERIOUS COMORBIDITY WITH BODY MASS INDEX (BMI) OF 30.0 TO 30.9 IN ADULT: ICD-10-CM

## 2023-05-24 LAB
ALBUMIN SERPL-MCNC: 4 G/DL (ref 3.5–5.2)
ALBUMIN/GLOB SERPL: 1.6 G/DL
ALP SERPL-CCNC: 69 U/L (ref 39–117)
ALT SERPL W P-5'-P-CCNC: 16 U/L (ref 1–33)
ANION GAP SERPL CALCULATED.3IONS-SCNC: 8.7 MMOL/L (ref 5–15)
AST SERPL-CCNC: 23 U/L (ref 1–32)
BASOPHILS # BLD AUTO: 0.07 10*3/MM3 (ref 0–0.2)
BASOPHILS NFR BLD AUTO: 1.1 % (ref 0–1.5)
BILIRUB SERPL-MCNC: 0.2 MG/DL (ref 0–1.2)
BUN SERPL-MCNC: 10 MG/DL (ref 6–20)
BUN/CREAT SERPL: 8.8 (ref 7–25)
CALCIUM SPEC-SCNC: 9.1 MG/DL (ref 8.6–10.5)
CHLORIDE SERPL-SCNC: 106 MMOL/L (ref 98–107)
CHOLEST SERPL-MCNC: 175 MG/DL (ref 0–200)
CO2 SERPL-SCNC: 27.3 MMOL/L (ref 22–29)
CREAT SERPL-MCNC: 1.14 MG/DL (ref 0.57–1)
DEPRECATED RDW RBC AUTO: 45.5 FL (ref 37–54)
EGFRCR SERPLBLD CKD-EPI 2021: 57.7 ML/MIN/1.73
EOSINOPHIL # BLD AUTO: 0.2 10*3/MM3 (ref 0–0.4)
EOSINOPHIL NFR BLD AUTO: 3.2 % (ref 0.3–6.2)
ERYTHROCYTE [DISTWIDTH] IN BLOOD BY AUTOMATED COUNT: 13.9 % (ref 12.3–15.4)
GLOBULIN UR ELPH-MCNC: 2.5 GM/DL
GLUCOSE SERPL-MCNC: 97 MG/DL (ref 65–99)
HBA1C MFR BLD: 5.1 % (ref 4.8–5.6)
HCT VFR BLD AUTO: 40.9 % (ref 34–46.6)
HDLC SERPL-MCNC: 51 MG/DL (ref 40–60)
HGB BLD-MCNC: 13.4 G/DL (ref 12–15.9)
IMM GRANULOCYTES # BLD AUTO: 0.02 10*3/MM3 (ref 0–0.05)
IMM GRANULOCYTES NFR BLD AUTO: 0.3 % (ref 0–0.5)
LDLC SERPL CALC-MCNC: 107 MG/DL (ref 0–100)
LDLC/HDLC SERPL: 2.06 {RATIO}
LYMPHOCYTES # BLD AUTO: 1.99 10*3/MM3 (ref 0.7–3.1)
LYMPHOCYTES NFR BLD AUTO: 31.7 % (ref 19.6–45.3)
MCH RBC QN AUTO: 29.6 PG (ref 26.6–33)
MCHC RBC AUTO-ENTMCNC: 32.8 G/DL (ref 31.5–35.7)
MCV RBC AUTO: 90.3 FL (ref 79–97)
MONOCYTES # BLD AUTO: 0.57 10*3/MM3 (ref 0.1–0.9)
MONOCYTES NFR BLD AUTO: 9.1 % (ref 5–12)
NEUTROPHILS NFR BLD AUTO: 3.43 10*3/MM3 (ref 1.7–7)
NEUTROPHILS NFR BLD AUTO: 54.6 % (ref 42.7–76)
NRBC BLD AUTO-RTO: 0 /100 WBC (ref 0–0.2)
PLATELET # BLD AUTO: 266 10*3/MM3 (ref 140–450)
PMV BLD AUTO: 9.7 FL (ref 6–12)
POTASSIUM SERPL-SCNC: 4.6 MMOL/L (ref 3.5–5.2)
PROT SERPL-MCNC: 6.5 G/DL (ref 6–8.5)
RBC # BLD AUTO: 4.53 10*6/MM3 (ref 3.77–5.28)
SODIUM SERPL-SCNC: 142 MMOL/L (ref 136–145)
T4 FREE SERPL-MCNC: 1.53 NG/DL (ref 0.93–1.7)
TRIGL SERPL-MCNC: 95 MG/DL (ref 0–150)
TSH SERPL DL<=0.05 MIU/L-ACNC: 0.26 UIU/ML (ref 0.27–4.2)
VLDLC SERPL-MCNC: 17 MG/DL (ref 5–40)
WBC NRBC COR # BLD: 6.28 10*3/MM3 (ref 3.4–10.8)

## 2023-05-24 PROCEDURE — 80050 GENERAL HEALTH PANEL: CPT

## 2023-05-24 PROCEDURE — 80061 LIPID PANEL: CPT

## 2023-05-24 PROCEDURE — 84439 ASSAY OF FREE THYROXINE: CPT

## 2023-05-24 PROCEDURE — 36415 COLL VENOUS BLD VENIPUNCTURE: CPT

## 2023-05-24 PROCEDURE — 83036 HEMOGLOBIN GLYCOSYLATED A1C: CPT

## 2023-05-30 ENCOUNTER — TELEPHONE (OUTPATIENT)
Dept: FAMILY MEDICINE CLINIC | Facility: CLINIC | Age: 53
End: 2023-05-30

## 2023-05-30 RX ORDER — LEVOTHYROXINE SODIUM 0.1 MG/1
100 TABLET ORAL DAILY
Qty: 30 TABLET | Refills: 2 | Status: SHIPPED | OUTPATIENT
Start: 2023-05-30

## 2023-05-30 NOTE — TELEPHONE ENCOUNTER
----- Message from CATE Boston sent at 5/30/2023  8:29 AM EDT -----  Please call the patient regarding her abnormal result. Her creatinine is slightly elevated she needs to increase her water intake. Her tsh is low as well. We need to decrease her dose to 100mcg daily.     Patient notified & verbalized understanding.

## 2023-06-01 ENCOUNTER — DISEASE STATE MANAGEMENT VISIT (OUTPATIENT)
Dept: PHARMACY | Facility: HOSPITAL | Age: 53
End: 2023-06-01

## 2023-06-01 VITALS
WEIGHT: 181.8 LBS | BODY MASS INDEX: 28.53 KG/M2 | DIASTOLIC BLOOD PRESSURE: 86 MMHG | HEIGHT: 67 IN | HEART RATE: 48 BPM | SYSTOLIC BLOOD PRESSURE: 135 MMHG

## 2023-06-01 RX ORDER — SEMAGLUTIDE 2.4 MG/.75ML
2.4 INJECTION, SOLUTION SUBCUTANEOUS WEEKLY
Qty: 3 ML | Refills: 0 | Status: SHIPPED | OUTPATIENT
Start: 2023-06-01

## 2023-06-01 NOTE — PROGRESS NOTES
[facility]  Weight Management Program      Karla Connell is a 52 y.o. female referred to the [facility] by Tiffani Quezada (3/9/23) for clinical pharmacy and specialty pharmacy management of Wegovy for weight management. Karla Connell has previously tried walking, eating healthy, and lifestyle changes for weight loss.  Current weight loss efforts include walking 2-4 times a week 30-45 minutes and Wegovy 0.5mg. Patient had her thyroid removed in . Patient reports that she has lost ~100 lbs over the years by walking.    Patient is currently on Wegovy 1.7mg weekly. Patient denies any lumps/swelling/hoarseness in neck/throat or abdominal pain. Patient reports some constipation but that she normally has issues with constipation and does well with an OTC stool softener. Patient denies any trouble giving injection or any missed doses. Patient wishes to titrate dose at this time. Patient reports that she recently had labs drawn and that Tiffani lowered her Synthroid dose based on her results. She also says that Tiffani encouraged her to drink more water since her serum creatinine was elevated. Patient states that she has been meeting her water intake goal and wishes to increase her goal to 80oz a day. She is also meeting her walking goal and would like to keep this goal the same for now.      Relevant Past Medical History and Co-morbidities  Past Medical History:   Diagnosis Date   • Anxiety    • Arthritis    • Back problem    • Bladder disorder    • Bradycardia    • Bulging lumbar disc    • Disease of thyroid gland    • Headache    • Hx of migraines    • Neuromuscular disorder      Social History     Socioeconomic History   • Marital status: Single   Tobacco Use   • Smoking status: Former     Packs/day: 2.00     Years: 20.00     Pack years: 40.00     Types: Cigarettes     Quit date: 2015     Years since quittin.7   • Smokeless tobacco: Never   Vaping Use   • Vaping Use:  Former   Substance and Sexual Activity   • Alcohol use: Never   • Drug use: Never   • Sexual activity: Yes     Partners: Male     Birth control/protection: Other, None       Allergies  Patient has no known allergies.    Current Medication List    Current Outpatient Medications:   •  Biotin 10 MG capsule, Take 10 mg by mouth Daily., Disp: , Rfl:   •  buPROPion SR (Wellbutrin SR) 200 MG 12 hr tablet, Take 1 tablet(s) by mouth 2 times daily, Disp: 180 tablet, Rfl: 2  •  cyclobenzaprine (FEXMID) 7.5 MG tablet, Take 1 tablet (7.5 mg) by mouth twice per day as needed., Disp: 20 tablet, Rfl: 2  •  cyclobenzaprine (FEXMID) 7.5 MG tablet, Take 1 tablet by mouth 2 (Two) Times a Day., Disp: 20 tablet, Rfl: 2  •  estrogen, conjugated,-medroxyprogesterone (Prempro) 0.3-1.5 MG per tablet, Take 1 tablet by mouth daily., Disp: 28 tablet, Rfl: 3  •  gabapentin (Neurontin) 600 MG tablet, Take 1 tablet by mouth 3 (Three) Times a Day., Disp: 90 tablet, Rfl: 2  •  hydrOXYzine (ATARAX) 50 MG tablet, Take 1 tablet by mouth 2 (Two) Times a Day., Disp: 180 tablet, Rfl: 2  •  levothyroxine (Synthroid) 100 MCG tablet, Take 1 tablet by mouth Daily., Disp: 30 tablet, Rfl: 2  •  montelukast (SINGULAIR) 10 MG tablet, TAKE 1 TABLET BY MOUTH EACH EVENING., Disp: 90 tablet, Rfl: 2  •  Omega-3 Fatty Acids (fish oil) 1000 MG capsule capsule, Take 1 capsule by mouth 2 (Two) Times a Day., Disp: , Rfl:   •  oxybutynin (DITROPAN) 5 MG tablet, Take 1 tablet by mouth 2 times daily., Disp: 180 tablet, Rfl: 2  •  Semaglutide-Weight Management (Wegovy) 1.7 MG/0.75ML solution auto-injector, Inject 0.75 mL under the skin into the appropriate area as directed 1 (One) Time Per Week., Disp: 3 mL, Rfl: 0  •  SUMAtriptan (Imitrex) 25 MG tablet, Take 1 tablet by mouth  as needed for migraine, may repeat every 2 hours as needed., Disp: 27 tablet, Rfl: 2    Drug Interactions  Wegovy + Prempro -  may diminish the therapeutic effect of Antidiabetic Agents.  Wegovy +  "Levothyroxine- Wegovy can increase serum concentration of Levothyroxine    Relevant Laboratory Values  Lab Results   Component Value Date    CHOL 175 05/24/2023    TRIG 95 05/24/2023    HDL 51 05/24/2023     (H) 05/24/2023     There is no height or weight on file to calculate BMI.    Vaccinations:   Patient recommended to keep up with routine vaccinations.     Goals of Therapy  • Clinical Goals or Therapeutic Targets: 10% weight loss goal      Date 11/11/22 12/9/22 1/5/23 1/19/23 2/9/23 3/9/23 4/6/23 5/4/23 6/1/23   Weight (lb) 206 lbs 204.4 lbs 197.8 lb 196.2 lb 194.8 lb 191.2lb 190.2lb 184.4 lb 181.8 lb   BMI kg/ 32.75 32.5 31.45 31.19 30.97 30.39 30.27 29.32 28.9   Waist Circumference (in)  45 in 47 in 46\"   46.5\"  *changing to Wegovy when PA goes through 45\" 42\" 42\" 41\" 41\"       Medication Assessment & Plan    Patient's current BMI is 28.9, which is considered overweight. She has lost 24.2 lbs overall. Congratulated patient on her success thus far!     Will increase to Wegovy 2.4 mg SC weekly.     Discussed lifestyle modifications, including diet and exercise. Patient education and injection training provided.     Reviewed labs from 5/24/23 with patient. Encouraged her to increase water intake.    Worked with patient to create SMART Goal(s):      1. Increase water intake to 80 oz of water a day   2. Continue to walk 45 minutes 4 times a week    Will follow-up with patient in 4 weeks, or sooner if needed.     Yumiko Marcum, PharmD  6/1/2023  10:36 EDT  "

## 2023-06-07 ENCOUNTER — TRANSCRIBE ORDERS (OUTPATIENT)
Dept: ADMINISTRATIVE | Facility: HOSPITAL | Age: 53
End: 2023-06-07
Payer: COMMERCIAL

## 2023-06-07 DIAGNOSIS — Z12.31 SCREENING MAMMOGRAM FOR BREAST CANCER: Primary | ICD-10-CM

## 2023-07-28 ENCOUNTER — DISEASE STATE MANAGEMENT VISIT (OUTPATIENT)
Dept: PHARMACY | Facility: HOSPITAL | Age: 53
End: 2023-07-28
Payer: COMMERCIAL

## 2023-07-28 VITALS
HEIGHT: 67 IN | HEART RATE: 51 BPM | BODY MASS INDEX: 27.25 KG/M2 | SYSTOLIC BLOOD PRESSURE: 125 MMHG | DIASTOLIC BLOOD PRESSURE: 76 MMHG | WEIGHT: 173.6 LBS

## 2023-07-28 RX ORDER — SEMAGLUTIDE 2.4 MG/.75ML
2.4 INJECTION, SOLUTION SUBCUTANEOUS WEEKLY
Qty: 3 ML | Refills: 0 | Status: SHIPPED | OUTPATIENT
Start: 2023-07-28

## 2023-07-28 NOTE — PROGRESS NOTES
[facility]  Weight Management Program      Karla Connell is a 52 y.o. female referred to the [facility] by Tiffani Quezada (3/9/23) for clinical pharmacy and specialty pharmacy management of Wegovy for weight management. Karla Connell has previously tried walking, eating healthy, and lifestyle changes for weight loss.  Current weight loss efforts include walking 2-4 times a week 30-45 minutes and Wegovy 2.4mg. Patient had her thyroid removed in . Patient reports that she has lost ~100 lbs over the years by walking.    Patient is currently on Wegovy 2.4 mg weekly. Patient denies any lumps/swelling/hoarseness in neck/throat or abdominal pain. Patient reports occasional constipation and that she takes an OTC stool softener. Patient denies any trouble giving injection or any missed doses. Patient reports that she has been consistently meeting her SMART goals and wishes to continue with these goals for now.      Relevant Past Medical History and Co-morbidities  Past Medical History:   Diagnosis Date    Anxiety     Arthritis     Back problem     Bladder disorder     Bradycardia     Bulging lumbar disc     Disease of thyroid gland     Headache     Hx of migraines     Neuromuscular disorder      Social History     Socioeconomic History    Marital status: Single   Tobacco Use    Smoking status: Former     Packs/day: 2.00     Years: 20.00     Pack years: 40.00     Types: Cigarettes     Quit date: 2015     Years since quittin.9    Smokeless tobacco: Never   Vaping Use    Vaping Use: Former   Substance and Sexual Activity    Alcohol use: Never    Drug use: Never    Sexual activity: Yes     Partners: Male     Birth control/protection: Other, None       Allergies  Patient has no known allergies.    Current Medication List    Current Outpatient Medications:     Biotin 10 MG capsule, Take 10 mg by mouth Daily., Disp: , Rfl:     buPROPion SR (Wellbutrin SR) 200 MG 12 hr tablet,  Take 1 tablet(s) by mouth 2 times daily, Disp: 180 tablet, Rfl: 2    buPROPion SR (Wellbutrin SR) 200 MG 12 hr tablet, Take 1 tablet by mouth 2 (Two) Times a Day., Disp: 180 tablet, Rfl: 2    cyclobenzaprine (FEXMID) 7.5 MG tablet, Take 1 tablet (7.5 mg) by mouth twice per day as needed., Disp: 20 tablet, Rfl: 2    cyclobenzaprine (FEXMID) 7.5 MG tablet, Take 1 tablet by mouth 2 (Two) Times a Day., Disp: 20 tablet, Rfl: 2    cyclobenzaprine (FEXMID) 7.5 MG tablet, Take 1 tablet (7.5 mg) by mouth twice per day as needed., Disp: 20 tablet, Rfl: 2    estrogen, conjugated,-medroxyprogesterone (Prempro) 0.3-1.5 MG per tablet, Take 1 tablet by mouth daily., Disp: 28 tablet, Rfl: 3    gabapentin (Neurontin) 600 MG tablet, Take 1 tablet by mouth 3 (Three) Times a Day., Disp: 90 tablet, Rfl: 2    hydrOXYzine (ATARAX) 50 MG tablet, Take 1 tablet by mouth 2 (Two) Times a Day., Disp: 180 tablet, Rfl: 2    hydrOXYzine (ATARAX) 50 MG tablet, Take 1 tablet by mouth 2 (Two) Times a Day., Disp: 180 tablet, Rfl: 2    levothyroxine (Levoxyl) 112 MCG tablet, Take 1 tablet by mouth Daily., Disp: 90 tablet, Rfl: 2    levothyroxine (Synthroid) 100 MCG tablet, Take 1 tablet by mouth Daily., Disp: 30 tablet, Rfl: 2    montelukast (SINGULAIR) 10 MG tablet, TAKE 1 TABLET BY MOUTH EACH EVENING., Disp: 90 tablet, Rfl: 2    montelukast (SINGULAIR) 10 MG tablet, Take 1 tablet by mouth Every Evening., Disp: 90 tablet, Rfl: 2    Omega-3 Fatty Acids (fish oil) 1000 MG capsule capsule, Take 1 capsule by mouth 2 (Two) Times a Day., Disp: , Rfl:     oxybutynin (DITROPAN) 5 MG tablet, Take 1 tablet by mouth 2 times daily., Disp: 180 tablet, Rfl: 2    oxybutynin (DITROPAN) 5 MG tablet, Take 1 tablet by mouth 2 (Two) Times a Day., Disp: 180 tablet, Rfl: 2    Semaglutide-Weight Management (Wegovy) 2.4 MG/0.75ML solution auto-injector, Inject 2.4 mg under the skin into the appropriate area as directed 1 (One) Time Per Week., Disp: 3 mL, Rfl: 0     "SUMAtriptan (Imitrex) 25 MG tablet, Take 1 tablet by mouth  as needed for migraine, may repeat every 2 hours as needed., Disp: 27 tablet, Rfl: 2    SUMAtriptan (Imitrex) 25 MG tablet, Take 1 tablet by mouth as needed for migraine, may repeat every 2 hours as needed., Disp: 27 tablet, Rfl: 2    Drug Interactions  Wegovy + Prempro -  may diminish the therapeutic effect of Antidiabetic Agents.  Wegovy + Levothyroxine- Wegovy can increase serum concentration of Levothyroxine    Relevant Laboratory Values  Lab Results   Component Value Date    CHOL 175 05/24/2023    TRIG 95 05/24/2023    HDL 51 05/24/2023     (H) 05/24/2023     There is no height or weight on file to calculate BMI.    Vaccinations:   Patient recommended to keep up with routine vaccinations.     Goals of Therapy  Clinical Goals or Therapeutic Targets: 10% weight loss goal      Date 11/11/22 12/9/22 1/5/23 1/19/23 2/9/23 3/9/23 4/6/23 5/4/23 6/1/23   Weight (lb) 206 lbs 204.4 lbs 197.8 lb 196.2 lb 194.8 lb 191.2lb 190.2lb 184.4 lb 181.8 lb   BMI kg/ 32.75 32.5 31.45 31.19 30.97 30.39 30.27 29.32 28.9   Waist Circumference (in)  45 in 47 in 46\"   46.5\"  *changing to Wegovy when PA goes through 45\" 42\" 42\" 41\" 41\"     Date 6/30/23 7/28/23   Weight (lb) 177.2lb 173.6 lb   BMI kg/ 28.17 27.60   Waist Circumference (in)  40 in 42.8\"       Medication Assessment & Plan    Patient's current BMI is 27.60, which is considered overweight. She has lost 32.4 lbs overall. Congratulated patient on her success thus far!     Will re-order Wegovy 2.4 mg SC weekly.     Discussed lifestyle modifications, including diet and exercise. Patient education and injection training provided.     Most recent labs from 5/24/23.    Worked with patient to create SMART Goal(s):  pt will continue to work towards these goals.     1. Increase water intake to 80 oz of water a day   2. Continue to walk 45 minutes 4 times a week    Will follow-up with patient in 4 weeks, or sooner if " needed.     Yumiko Marcum, PharmD  7/28/2023  15:34 EDT

## 2023-08-15 ENCOUNTER — OFFICE VISIT (OUTPATIENT)
Dept: FAMILY MEDICINE CLINIC | Facility: CLINIC | Age: 53
End: 2023-08-15
Payer: COMMERCIAL

## 2023-08-15 VITALS
SYSTOLIC BLOOD PRESSURE: 120 MMHG | WEIGHT: 170.2 LBS | BODY MASS INDEX: 26.71 KG/M2 | HEART RATE: 55 BPM | HEIGHT: 67 IN | TEMPERATURE: 96 F | DIASTOLIC BLOOD PRESSURE: 80 MMHG | OXYGEN SATURATION: 97 %

## 2023-08-15 DIAGNOSIS — E66.3 OVERWEIGHT (BMI 25.0-29.9): Primary | ICD-10-CM

## 2023-08-15 DIAGNOSIS — R63.4 WEIGHT LOSS: ICD-10-CM

## 2023-08-15 PROCEDURE — 99214 OFFICE O/P EST MOD 30 MIN: CPT | Performed by: FAMILY MEDICINE

## 2023-08-15 NOTE — PROGRESS NOTES
"Chief Complaint  Weight loss    Subjective          Karla Connell presents to [facility]  Obesity      Patient here to follow up on weight loss. States she is doing well with medication a this time. Has lost 12lbs since last visit. Denies any adverse side effects. Will order repeat labs.     Review of Systems      Objective   Vital Signs:   /80 (BP Location: Right arm, Patient Position: Sitting, Cuff Size: Adult)   Pulse 55   Temp 96 øF (35.6 øC) (Temporal)   Ht 168.9 cm (66.5\")   Wt 77.2 kg (170 lb 3.2 oz)   SpO2 97%   BMI 27.06 kg/mý     Physical Exam  Constitutional:       General: She is not in acute distress.     Appearance: Normal appearance. She is well-developed and well-groomed. She is not ill-appearing, toxic-appearing or diaphoretic.   HENT:      Head: Normocephalic.      Nose: Nose normal. No congestion or rhinorrhea.      Mouth/Throat:      Mouth: Mucous membranes are moist.      Pharynx: Oropharynx is clear. No oropharyngeal exudate or posterior oropharyngeal erythema.   Eyes:      General: Lids are normal.         Right eye: No discharge.         Left eye: No discharge.      Extraocular Movements: Extraocular movements intact.      Pupils: Pupils are equal, round, and reactive to light.   Neck:      Vascular: No carotid bruit.   Cardiovascular:      Rate and Rhythm: Normal rate and regular rhythm.      Pulses: Normal pulses.      Heart sounds: Normal heart sounds. No murmur heard.    No friction rub. No gallop.   Pulmonary:      Effort: Pulmonary effort is normal. No respiratory distress.      Breath sounds: Normal breath sounds. No stridor. No wheezing, rhonchi or rales.   Chest:      Chest wall: No tenderness.   Abdominal:      General: Bowel sounds are normal. There is no distension.      Palpations: Abdomen is soft. There is no mass.      Tenderness: There is no abdominal tenderness. There is no right CVA tenderness, left CVA tenderness, guarding or " rebound.      Hernia: No hernia is present.   Musculoskeletal:         General: No swelling or tenderness. Normal range of motion.      Cervical back: Normal range of motion and neck supple. No rigidity or tenderness.      Right lower leg: No edema.      Left lower leg: No edema.   Lymphadenopathy:      Cervical: No cervical adenopathy.   Skin:     General: Skin is warm.      Capillary Refill: Capillary refill takes less than 2 seconds.      Coloration: Skin is not jaundiced.      Findings: No bruising, erythema or rash.   Neurological:      General: No focal deficit present.      Mental Status: She is alert and oriented to person, place, and time.      Motor: Motor function is intact. No weakness.      Coordination: Coordination is intact.      Gait: Gait is intact. Gait normal.   Psychiatric:         Attention and Perception: Attention normal.         Mood and Affect: Mood normal.         Speech: Speech normal.         Behavior: Behavior normal.         Cognition and Memory: Cognition normal.         Judgment: Judgment normal.      Result Review :                 Assessment and Plan    Diagnoses and all orders for this visit:    1. Overweight (BMI 25.0-29.9) (Primary)  -     CBC Auto Differential; Future  -     Comprehensive Metabolic Panel; Future  -     Hemoglobin A1c; Future  -     Lipid Panel; Future  -     TSH; Future  -     T4, Free; Future    2. Weight loss  Comments:  continue wegovy      Patient's Body mass index is 27.06 kg/mý. indicating that she is overweight (BMI 25-29.9). Patient's (Body mass index is 27.06 kg/mý.) indicates that they are overweight with health conditions that include none . Weight is improving with treatment. BMI is is above average; BMI management plan is completed. We discussed low calorie, low carb based diet program, portion control, increasing exercise, and pharmacologic options including weogyv . .    Follow Up   Return in about 3 months (around 11/15/2023), or if symptoms  worsen or fail to improve.  Patient was given instructions and counseling regarding her condition or for health maintenance advice. Please see specific information pulled into the AVS if appropriate.     This document has been electronically signed by CATE Boston  August 15, 2023 14:45 EDT

## 2023-08-21 NOTE — TELEPHONE ENCOUNTER
Caller: Karla Connell    Relationship: Self    Best call back number: [phone]     Requested Prescriptions:   Requested Prescriptions     Pending Prescriptions Disp Refills    levothyroxine (Synthroid) 100 MCG tablet 30 tablet 2     Sig: Take 1 tablet by mouth Daily.        Pharmacy where request should be sent: [facility]      Last office visit with prescribing clinician: 8/15/2023   Last telemedicine visit with prescribing clinician: Visit date not found   Next office visit with prescribing clinician: 11/16/2023     Additional details provided by patient: SHOULD PATIENT CONTINUE THIS MEDICATION?  ANOTHER MEDICATION WAS ADDED.      Does the patient have less than a 3 day supply:  [x] Yes  [] No    Would you like a call back once the refill request has been completed: [x] Yes [] No    If the office needs to give you a call back, can they leave a voicemail: [x] Yes [] No    Noris Walton   08/21/23 11:02 EDT

## 2023-08-22 RX ORDER — LEVOTHYROXINE SODIUM 0.1 MG/1
100 TABLET ORAL DAILY
Qty: 30 TABLET | Refills: 2 | Status: SHIPPED | OUTPATIENT
Start: 2023-08-22

## 2023-08-29 ENCOUNTER — TELEPHONE (OUTPATIENT)
Dept: FAMILY MEDICINE CLINIC | Facility: CLINIC | Age: 53
End: 2023-08-29
Payer: COMMERCIAL

## 2023-08-29 NOTE — TELEPHONE ENCOUNTER
Spoke with pt regarding fasting overdue labs due stated she will be here in the morning to have them done. Verbal understanding.

## 2023-08-30 ENCOUNTER — LAB (OUTPATIENT)
Dept: LAB | Facility: HOSPITAL | Age: 53
End: 2023-08-30
Payer: COMMERCIAL

## 2023-08-30 DIAGNOSIS — E66.3 OVERWEIGHT (BMI 25.0-29.9): ICD-10-CM

## 2023-08-30 LAB
ALBUMIN SERPL-MCNC: 4.2 G/DL (ref 3.5–5.2)
ALBUMIN/GLOB SERPL: 2 G/DL
ALP SERPL-CCNC: 59 U/L (ref 39–117)
ALT SERPL W P-5'-P-CCNC: 17 U/L (ref 1–33)
ANION GAP SERPL CALCULATED.3IONS-SCNC: 9 MMOL/L (ref 5–15)
AST SERPL-CCNC: 23 U/L (ref 1–32)
BASOPHILS # BLD AUTO: 0.07 10*3/MM3 (ref 0–0.2)
BASOPHILS NFR BLD AUTO: 1 % (ref 0–1.5)
BILIRUB SERPL-MCNC: 0.3 MG/DL (ref 0–1.2)
BUN SERPL-MCNC: 10 MG/DL (ref 6–20)
BUN/CREAT SERPL: 10.4 (ref 7–25)
CALCIUM SPEC-SCNC: 9.3 MG/DL (ref 8.6–10.5)
CHLORIDE SERPL-SCNC: 105 MMOL/L (ref 98–107)
CHOLEST SERPL-MCNC: 164 MG/DL (ref 0–200)
CO2 SERPL-SCNC: 28 MMOL/L (ref 22–29)
CREAT SERPL-MCNC: 0.96 MG/DL (ref 0.57–1)
DEPRECATED RDW RBC AUTO: 44.9 FL (ref 37–54)
EGFRCR SERPLBLD CKD-EPI 2021: 70.9 ML/MIN/1.73
EOSINOPHIL # BLD AUTO: 0.26 10*3/MM3 (ref 0–0.4)
EOSINOPHIL NFR BLD AUTO: 3.7 % (ref 0.3–6.2)
ERYTHROCYTE [DISTWIDTH] IN BLOOD BY AUTOMATED COUNT: 14.2 % (ref 12.3–15.4)
GLOBULIN UR ELPH-MCNC: 2.1 GM/DL
GLUCOSE SERPL-MCNC: 76 MG/DL (ref 65–99)
HBA1C MFR BLD: 4.8 % (ref 4.8–5.6)
HCT VFR BLD AUTO: 39.8 % (ref 34–46.6)
HDLC SERPL-MCNC: 52 MG/DL (ref 40–60)
HGB BLD-MCNC: 13.3 G/DL (ref 12–15.9)
IMM GRANULOCYTES # BLD AUTO: 0.02 10*3/MM3 (ref 0–0.05)
IMM GRANULOCYTES NFR BLD AUTO: 0.3 % (ref 0–0.5)
LDLC SERPL CALC-MCNC: 94 MG/DL (ref 0–100)
LDLC/HDLC SERPL: 1.79 {RATIO}
LYMPHOCYTES # BLD AUTO: 1.93 10*3/MM3 (ref 0.7–3.1)
LYMPHOCYTES NFR BLD AUTO: 27.6 % (ref 19.6–45.3)
MCH RBC QN AUTO: 29.8 PG (ref 26.6–33)
MCHC RBC AUTO-ENTMCNC: 33.4 G/DL (ref 31.5–35.7)
MCV RBC AUTO: 89.2 FL (ref 79–97)
MONOCYTES # BLD AUTO: 0.73 10*3/MM3 (ref 0.1–0.9)
MONOCYTES NFR BLD AUTO: 10.4 % (ref 5–12)
NEUTROPHILS NFR BLD AUTO: 3.99 10*3/MM3 (ref 1.7–7)
NEUTROPHILS NFR BLD AUTO: 57 % (ref 42.7–76)
NRBC BLD AUTO-RTO: 0 /100 WBC (ref 0–0.2)
PLATELET # BLD AUTO: 297 10*3/MM3 (ref 140–450)
PMV BLD AUTO: 9.9 FL (ref 6–12)
POTASSIUM SERPL-SCNC: 3.8 MMOL/L (ref 3.5–5.2)
PROT SERPL-MCNC: 6.3 G/DL (ref 6–8.5)
RBC # BLD AUTO: 4.46 10*6/MM3 (ref 3.77–5.28)
SODIUM SERPL-SCNC: 142 MMOL/L (ref 136–145)
T4 FREE SERPL-MCNC: 1.41 NG/DL (ref 0.93–1.7)
TRIGL SERPL-MCNC: 95 MG/DL (ref 0–150)
TSH SERPL DL<=0.05 MIU/L-ACNC: 0.23 UIU/ML (ref 0.27–4.2)
VLDLC SERPL-MCNC: 18 MG/DL (ref 5–40)
WBC NRBC COR # BLD: 7 10*3/MM3 (ref 3.4–10.8)

## 2023-08-30 PROCEDURE — 83036 HEMOGLOBIN GLYCOSYLATED A1C: CPT

## 2023-08-30 PROCEDURE — 36415 COLL VENOUS BLD VENIPUNCTURE: CPT

## 2023-08-30 PROCEDURE — 80061 LIPID PANEL: CPT

## 2023-08-30 PROCEDURE — 84439 ASSAY OF FREE THYROXINE: CPT

## 2023-08-30 PROCEDURE — 80050 GENERAL HEALTH PANEL: CPT

## 2023-08-31 ENCOUNTER — DISEASE STATE MANAGEMENT VISIT (OUTPATIENT)
Dept: PHARMACY | Facility: HOSPITAL | Age: 53
End: 2023-08-31
Payer: COMMERCIAL

## 2023-08-31 VITALS
SYSTOLIC BLOOD PRESSURE: 129 MMHG | DIASTOLIC BLOOD PRESSURE: 81 MMHG | BODY MASS INDEX: 26.49 KG/M2 | HEIGHT: 67 IN | WEIGHT: 168.8 LBS

## 2023-08-31 RX ORDER — SEMAGLUTIDE 2.4 MG/.75ML
2.4 INJECTION, SOLUTION SUBCUTANEOUS WEEKLY
Qty: 3 ML | Refills: 0 | Status: SHIPPED | OUTPATIENT
Start: 2023-08-31

## 2023-08-31 NOTE — PROGRESS NOTES
[facility]  Weight Management Program      Karla Connell is a 52 y.o. female referred to the [facility] by Tiffani Quezada (3/9/23) for clinical pharmacy and specialty pharmacy management of Wegovy for weight management. Karla Connell has previously tried walking, eating healthy, and lifestyle changes for weight loss.  Current weight loss efforts include walking 2-4 times a week 30-45 minutes and Wegovy 2.4mg. Patient had her thyroid removed in . Patient reports that she has lost ~100 lbs over the years by walking.    Patient is currently on Wegovy 2.4 mg weekly. Patient denies any lumps/swelling/hoarseness in neck/throat or abdominal pain. Patient reports occasional constipation and that she takes an OTC stool softener. Patient denies any trouble giving injection or any missed doses. Patient reports that she has been consistently meeting her SMART goals and wishes to continue with these goals for now. Patient reports a goal weight of 150 lbs.      Relevant Past Medical History and Co-morbidities  Past Medical History:   Diagnosis Date    Anxiety     Arthritis     Back problem     Bladder disorder     Bradycardia     Bulging lumbar disc     Disease of thyroid gland     Headache     Hx of migraines     Neuromuscular disorder      Social History     Socioeconomic History    Marital status: Single   Tobacco Use    Smoking status: Former     Packs/day: 2.00     Years: 20.00     Pack years: 40.00     Types: Cigarettes     Quit date: 2015     Years since quittin.0    Smokeless tobacco: Never   Vaping Use    Vaping Use: Former   Substance and Sexual Activity    Alcohol use: Never    Drug use: Never    Sexual activity: Yes     Partners: Male     Birth control/protection: Other, None       Allergies  Patient has no known allergies.    Current Medication List    Current Outpatient Medications:     Biotin 10 MG capsule, Take 10 mg by mouth Daily., Disp: , Rfl:     buPROPion  SR (Wellbutrin SR) 200 MG 12 hr tablet, Take 1 tablet by mouth 2 (Two) Times a Day., Disp: 180 tablet, Rfl: 2    cyclobenzaprine (FEXMID) 7.5 MG tablet, Take 1 tablet (7.5 mg) by mouth twice per day as needed., Disp: 20 tablet, Rfl: 2    estrogen, conjugated,-medroxyprogesterone (Prempro) 0.3-1.5 MG per tablet, Take 1 tablet by mouth daily., Disp: 28 tablet, Rfl: 3    gabapentin (Neurontin) 600 MG tablet, Take 1 tablet by mouth 3 (Three) Times a Day., Disp: 90 tablet, Rfl: 2    hydrOXYzine (ATARAX) 50 MG tablet, Take 1 tablet by mouth 2 (Two) Times a Day., Disp: 180 tablet, Rfl: 2    levothyroxine (Synthroid) 100 MCG tablet, Take 1 tablet by mouth Daily., Disp: 30 tablet, Rfl: 2    montelukast (SINGULAIR) 10 MG tablet, Take 1 tablet by mouth Every Evening., Disp: 90 tablet, Rfl: 2    Omega-3 Fatty Acids (fish oil) 1000 MG capsule capsule, Take 1 capsule by mouth 2 (Two) Times a Day., Disp: , Rfl:     oxybutynin (DITROPAN) 5 MG tablet, Take 1 tablet by mouth 2 (Two) Times a Day., Disp: 180 tablet, Rfl: 2    Semaglutide-Weight Management (Wegovy) 2.4 MG/0.75ML solution auto-injector, Inject 2.4 mg under the skin into the appropriate area as directed 1 (One) Time Per Week., Disp: 3 mL, Rfl: 0    SUMAtriptan (Imitrex) 25 MG tablet, Take 1 tablet by mouth as needed for migraine, may repeat every 2 hours as needed., Disp: 27 tablet, Rfl: 2    Drug Interactions  Wegovy + Prempro -  may diminish the therapeutic effect of Antidiabetic Agents.  Wegovy + Levothyroxine- Wegovy can increase serum concentration of Levothyroxine    Relevant Laboratory Values  Lab Results   Component Value Date    CHOL 164 08/30/2023    TRIG 95 08/30/2023    HDL 52 08/30/2023    LDL 94 08/30/2023     There is no height or weight on file to calculate BMI.    Vaccinations:   Patient recommended to keep up with routine vaccinations.     Goals of Therapy  Clinical Goals or Therapeutic Targets: 10% weight loss goal      Date 11/11/22 12/9/22 1/5/23  "1/19/23 2/9/23 3/9/23 4/6/23 5/4/23 6/1/23   Weight (lb) 206 lbs 204.4 lbs 197.8 lb 196.2 lb 194.8 lb 191.2lb 190.2lb 184.4 lb 181.8 lb   BMI kg/ 32.75 32.5 31.45 31.19 30.97 30.39 30.27 29.32 28.9   Waist Circumference (in)  45 in 47 in 46\"   46.5\"  *changing to Wegovy when PA goes through 45\" 42\" 42\" 41\" 41\"     Date 6/30/23 7/28/23 8/31/23   Weight (lb) 177.2lb 173.6 lb 168.8 lb   BMI kg/ 28.17 27.60 26.84   Waist Circumference (in)  40 in 42.8\" 41\"       Medication Assessment & Plan    Patient's current BMI is 26.84, which is considered overweight. She has lost 37.2 lbs overall. Congratulated patient on her success thus far!     Will re-order Wegovy 2.4 mg SC weekly.     Discussed lifestyle modifications, including diet and exercise. Patient education and injection training provided.     Worked with patient to create SMART Goal(s):  pt will continue to work towards these goals.     1. Increase water intake to 80 oz of water a day   2. Continue to walk 45 minutes 4 times a week    Will follow-up with patient in 4 weeks, or sooner if needed.     Yumiko Marcum, PharmD  8/31/2023  13:39 EDT  "

## 2023-09-07 ENCOUNTER — TELEPHONE (OUTPATIENT)
Dept: FAMILY MEDICINE CLINIC | Facility: CLINIC | Age: 53
End: 2023-09-07
Payer: COMMERCIAL

## 2023-09-07 NOTE — TELEPHONE ENCOUNTER
----- Message from CATE Boston sent at 9/7/2023  9:09 AM EDT -----  Please let patient know results are normal. Thank you.     Letter sent.

## 2023-09-28 ENCOUNTER — DISEASE STATE MANAGEMENT VISIT (OUTPATIENT)
Dept: PHARMACY | Facility: HOSPITAL | Age: 53
End: 2023-09-28
Payer: COMMERCIAL

## 2023-09-28 VITALS
WEIGHT: 168.2 LBS | SYSTOLIC BLOOD PRESSURE: 128 MMHG | HEIGHT: 67 IN | HEART RATE: 52 BPM | DIASTOLIC BLOOD PRESSURE: 76 MMHG | BODY MASS INDEX: 26.4 KG/M2

## 2023-09-28 RX ORDER — SEMAGLUTIDE 2.4 MG/.75ML
2.4 INJECTION, SOLUTION SUBCUTANEOUS WEEKLY
Qty: 3 ML | Refills: 0 | Status: SHIPPED | OUTPATIENT
Start: 2023-09-28

## 2023-09-28 NOTE — PROGRESS NOTES
[facility]  Weight Management Program      Karla Connell is a 52 y.o. female referred to the [facility] by Tiffani Quezada (3/9/23) for clinical pharmacy and specialty pharmacy management of Wegovy for weight management. Karla Connell has previously tried walking, eating healthy, and lifestyle changes for weight loss.  Current weight loss efforts include walking 2-4 times a week 30-45 minutes and Wegovy 2.4mg. Patient had her thyroid removed in . Patient reports that she has lost ~100 lbs over the years by walking.    Patient is currently on Wegovy 2.4 mg weekly. Patient denies any lumps/swelling/hoarseness in neck/throat or abdominal pain. Patient reports occasional constipation and that she takes an OTC stool softener. Patient plans to take Miralax on weekends to help with constipation; however, she has not yet started because she has been at work every day. Patient denies any trouble giving injection or any missed doses. Patient reports that she has been consistently meeting her SMART goals and wishes to continue with these goals for now. Patient reports a goal weight of 150 lbs.    Patient reports she is having all of her teeth extracted on 10/11/2023. Patient will have temporary implants placed during procedure. Patient will be on a strict liquid/soft food diet for an undetermined amount of time.     Relevant Past Medical History and Co-morbidities  Past Medical History:   Diagnosis Date    Anxiety     Arthritis     Back problem     Bladder disorder     Bradycardia     Bulging lumbar disc     Disease of thyroid gland     Headache     Hx of migraines     Neuromuscular disorder      Social History     Socioeconomic History    Marital status: Single   Tobacco Use    Smoking status: Former     Packs/day: 2.00     Years: 20.00     Pack years: 40.00     Types: Cigarettes     Quit date: 2015     Years since quittin.1    Smokeless tobacco: Never   Vaping Use    Vaping  Use: Former   Substance and Sexual Activity    Alcohol use: Never    Drug use: Never    Sexual activity: Yes     Partners: Male     Birth control/protection: Other, None       Allergies  Patient has no known allergies.    Current Medication List    Current Outpatient Medications:     Biotin 10 MG capsule, Take 10 mg by mouth Daily., Disp: , Rfl:     buPROPion SR (Wellbutrin SR) 200 MG 12 hr tablet, Take 1 tablet by mouth 2 (Two) Times a Day., Disp: 180 tablet, Rfl: 2    cyclobenzaprine (FEXMID) 7.5 MG tablet, Take 1 tablet (7.5 mg) by mouth twice per day as needed., Disp: 20 tablet, Rfl: 2    estrogen, conjugated,-medroxyprogesterone (Prempro) 0.3-1.5 MG per tablet, Take 1 tablet by mouth daily., Disp: 28 tablet, Rfl: 3    gabapentin (Neurontin) 600 MG tablet, Take 1 tablet by mouth 3 (Three) Times a Day., Disp: 90 tablet, Rfl: 2    hydrOXYzine (ATARAX) 50 MG tablet, Take 1 tablet by mouth 2 (Two) Times a Day., Disp: 180 tablet, Rfl: 2    levothyroxine (Synthroid) 100 MCG tablet, Take 1 tablet by mouth Daily., Disp: 30 tablet, Rfl: 2    montelukast (SINGULAIR) 10 MG tablet, Take 1 tablet by mouth Every Evening., Disp: 90 tablet, Rfl: 2    naproxen (NAPROSYN) 375 MG tablet, Take 1 tablet by mouth every 6 hours as needed for pain, Disp: 24 tablet, Rfl: 0    Omega-3 Fatty Acids (fish oil) 1000 MG capsule capsule, Take 1 capsule by mouth 2 (Two) Times a Day., Disp: , Rfl:     oxybutynin (DITROPAN) 5 MG tablet, Take 1 tablet by mouth 2 (Two) Times a Day., Disp: 180 tablet, Rfl: 2    penicillin v potassium (VEETID) 500 MG tablet, Take 1 tablet by mouth four times daily, Disp: 28 tablet, Rfl: 0    Semaglutide-Weight Management (Wegovy) 2.4 MG/0.75ML solution auto-injector, Inject 2.4 mg under the skin into the appropriate area as directed 1 (One) Time Per Week., Disp: 3 mL, Rfl: 0    SUMAtriptan (Imitrex) 25 MG tablet, Take 1 tablet by mouth as needed for migraine, may repeat every 2 hours as needed., Disp: 27 tablet, Rfl:  "2    Drug Interactions  Wegovy + Prempro -  may diminish the therapeutic effect of Antidiabetic Agents.  Wegovy + Levothyroxine- Wegovy can increase serum concentration of Levothyroxine    Relevant Laboratory Values  Lab Results   Component Value Date    CHOL 164 08/30/2023    TRIG 95 08/30/2023    HDL 52 08/30/2023    LDL 94 08/30/2023     There is no height or weight on file to calculate BMI.    Vaccinations:   Patient recommended to keep up with routine vaccinations.     Goals of Therapy  Clinical Goals or Therapeutic Targets: 10% weight loss goal      Date 11/11/22 12/9/22 1/5/23 1/19/23 2/9/23 3/9/23 4/6/23 5/4/23 6/1/23   Weight (lb) 206 lbs 204.4 lbs 197.8 lb 196.2 lb 194.8 lb 191.2lb 190.2lb 184.4 lb 181.8 lb   BMI kg/ 32.75 32.5 31.45 31.19 30.97 30.39 30.27 29.32 28.9   Waist Circumference (in)  45 in 47 in 46\"   46.5\"  *changing to Wegovy when PA goes through 45\" 42\" 42\" 41\" 41\"     Date 6/30/23 7/28/23 8/31/23 9/28/23   Weight (lb) 177.2lb 173.6 lb 168.8 lb 168.2 lb   BMI kg/ 28.17 27.60 26.84 26.74   Waist Circumference (in)  40 in 42.8\" 41\" 41.5\"       Medication Assessment & Plan    Patient's current BMI is 26.84, which is considered overweight. She has lost 37.8 lbs overall. Congratulated patient on her success thus far!     Will re-order Wegovy 2.4 mg SC weekly.     Patient having all of her teeth extracted and temporary implants placed on 10/11/2023. Advised patient to be seen by her referring provider, Tiffani Quezada, to establish a plan regarding Wegovy post procedure. Will forward note to Tiffani Quezada.    Discussed lifestyle modifications, including diet and exercise. Patient education and injection training provided.     Worked with patient to create SMART Goal(s):  pt will continue to work towards these goals.     1. Increase water intake to 80 oz of water a day   2. Continue to walk 45 minutes 4 times a week    Will follow-up with patient in 4 weeks, or sooner if needed.     Kelle" AISSATOU Pacheco  9/28/2023  13:32 EDT

## 2023-10-26 ENCOUNTER — DISEASE STATE MANAGEMENT VISIT (OUTPATIENT)
Dept: PHARMACY | Facility: HOSPITAL | Age: 53
End: 2023-10-26
Payer: COMMERCIAL

## 2023-10-26 VITALS
BODY MASS INDEX: 25.96 KG/M2 | SYSTOLIC BLOOD PRESSURE: 125 MMHG | HEART RATE: 54 BPM | HEIGHT: 67 IN | DIASTOLIC BLOOD PRESSURE: 76 MMHG | WEIGHT: 165.4 LBS

## 2023-10-26 RX ORDER — SEMAGLUTIDE 2.4 MG/.75ML
2.4 INJECTION, SOLUTION SUBCUTANEOUS WEEKLY
Qty: 3 ML | Refills: 0 | Status: SHIPPED | OUTPATIENT
Start: 2023-10-26

## 2023-10-26 NOTE — PROGRESS NOTES
[facility]  Weight Management Program      Karla Connell is a 52 y.o. female referred to the [facility] by Tiffani Quezada (3/9/23) for clinical pharmacy and specialty pharmacy management of Wegovy for weight management. Karla Connell has previously tried walking, eating healthy, and lifestyle changes for weight loss.  Current weight loss efforts include walking 2-4 times a week 30-45 minutes and Wegovy 2.4mg. Patient had her thyroid removed in . Patient reports that she has lost ~100 lbs over the years by walking.    Patient is currently on Wegovy 2.4 mg weekly. Patient denies any lumps/swelling/hoarseness in neck/throat or abdominal pain. Patient reports tolerating medication well without any side effects. Patient denies any trouble giving injection or any missed doses. Patient reports that her procedure to have all of her teeth extracted has been postponed to 11/15/23. She says that she hasn't been eating much while on Wegovy and know that she will be on a strict liquid/soft food diet for an undetermined amount of time. She is planning to reach out to Tiffani to see if she should hold her dose following the procedure.    Patient reports a goal weight of 150 lbs.      Relevant Past Medical History and Co-morbidities  Past Medical History:   Diagnosis Date    Anxiety     Arthritis     Back problem     Bladder disorder     Bradycardia     Bulging lumbar disc     Disease of thyroid gland     Headache     Hx of migraines     Neuromuscular disorder      Social History     Socioeconomic History    Marital status: Single   Tobacco Use    Smoking status: Former     Packs/day: 2.00     Years: 20.00     Additional pack years: 0.00     Total pack years: 40.00     Types: Cigarettes     Quit date: 2015     Years since quittin.1    Smokeless tobacco: Never   Vaping Use    Vaping Use: Former   Substance and Sexual Activity    Alcohol use: Never    Drug use: Never    Sexual  activity: Yes     Partners: Male     Birth control/protection: Other, None       Allergies  Patient has no known allergies.    Current Medication List    Current Outpatient Medications:     Biotin 10 MG capsule, Take 10 mg by mouth Daily., Disp: , Rfl:     buPROPion SR (Wellbutrin SR) 200 MG 12 hr tablet, Take 1 tablet by mouth 2 (Two) Times a Day., Disp: 180 tablet, Rfl: 2    buPROPion SR (Wellbutrin SR) 200 MG 12 hr tablet, Take 1 tablet(s) by mouth 2 times dailiy, Disp: 180 tablet, Rfl: 2    cyclobenzaprine (FEXMID) 7.5 MG tablet, Take 1 tablet (7.5 mg) by mouth twice per day as needed, Disp: 20 tablet, Rfl: 2    estrogen, conjugated,-medroxyprogesterone (Prempro) 0.3-1.5 MG per tablet, Take 1 tablet by mouth daily., Disp: 28 tablet, Rfl: 3    estrogen, conjugated,-medroxyprogesterone (Prempro) 0.3-1.5 MG per tablet, Take 1 tablet by mouth daily., Disp: 28 tablet, Rfl: 3    gabapentin (Neurontin) 600 MG tablet, Take 1 tablet by mouth 3 times a day., Disp: 90 tablet, Rfl: 2    hydrOXYzine (ATARAX) 50 MG tablet, Take 1 tablet by mouth 2 (Two) Times a Day., Disp: 180 tablet, Rfl: 2    hydrOXYzine (ATARAX) 50 MG tablet, Take 1 tablet by mouth 2 (Two) Times a Day., Disp: 180 tablet, Rfl: 2    levothyroxine (Synthroid) 100 MCG tablet, Take 1 tablet by mouth Daily., Disp: 30 tablet, Rfl: 2    montelukast (SINGULAIR) 10 MG tablet, Take 1 tablet by mouth Every Evening., Disp: 90 tablet, Rfl: 2    montelukast (SINGULAIR) 10 MG tablet, TAKE 1 TABLET(S) BY MOUTH EACH EVENING, Disp: 90 tablet, Rfl: 2    naproxen (NAPROSYN) 375 MG tablet, Take 1 tablet by mouth every 6 hours as needed for pain, Disp: 24 tablet, Rfl: 0    Omega-3 Fatty Acids (fish oil) 1000 MG capsule capsule, Take 1 capsule by mouth 2 (Two) Times a Day., Disp: , Rfl:     oxybutynin (DITROPAN) 5 MG tablet, Take 1 tablet by mouth 2 (Two) Times a Day., Disp: 180 tablet, Rfl: 2    oxybutynin (DITROPAN) 5 MG tablet, Take 1 tablet(s) by mouth 2 times daily, Disp:  "180 tablet, Rfl: 2    penicillin v potassium (VEETID) 500 MG tablet, Take 1 tablet by mouth four times daily, Disp: 28 tablet, Rfl: 0    Semaglutide-Weight Management (Wegovy) 2.4 MG/0.75ML solution auto-injector, Inject 2.4 mg under the skin into the appropriate area as directed 1 (One) Time Per Week., Disp: 3 mL, Rfl: 0    SUMAtriptan (Imitrex) 25 MG tablet, Take 1 tablet by mouth as needed for migraine, may repeat every 2 hours as needed., Disp: 27 tablet, Rfl: 2    SUMAtriptan (Imitrex) 25 MG tablet, Take 1 tab(s) by mouth as needed for migraine, may repeat every 2 hours as needed, Disp: 27 tablet, Rfl: 2    Drug Interactions  Wegovy + Prempro -  may diminish the therapeutic effect of Antidiabetic Agents.  Wegovy + Levothyroxine- Wegovy can increase serum concentration of Levothyroxine    Relevant Laboratory Values  Lab Results   Component Value Date    CHOL 164 08/30/2023    TRIG 95 08/30/2023    HDL 52 08/30/2023    LDL 94 08/30/2023     There is no height or weight on file to calculate BMI.    Vaccinations:   Patient recommended to keep up with routine vaccinations.     Goals of Therapy  Clinical Goals or Therapeutic Targets: 10% weight loss goal      Date 11/11/22 12/9/22 1/5/23 1/19/23 2/9/23 3/9/23 4/6/23 5/4/23 6/1/23   Weight (lb) 206 lbs 204.4 lbs 197.8 lb 196.2 lb 194.8 lb 191.2lb 190.2lb 184.4 lb 181.8 lb   BMI kg/ 32.75 32.5 31.45 31.19 30.97 30.39 30.27 29.32 28.9   Waist Circumference (in)  45 in 47 in 46\"   46.5\"  *changing to Wegovy when PA goes through 45\" 42\" 42\" 41\" 41\"     Date 6/30/23 7/28/23 8/31/23 9/28/23 10/26/23   Weight (lb) 177.2lb 173.6 lb 168.8 lb 168.2 lb 165.4 lb   BMI kg/ 28.17 27.60 26.84 26.74 26.30   Waist Circumference (in)  40 in 42.8\" 41\" 41.5\" 41\"       Medication Assessment & Plan    Patient's current BMI is 26.30, which is considered overweight. She has lost 40.6 lbs overall. Congratulated patient on her success thus far!     Will re-order Wegovy 2.4 mg SC weekly. "     Discussed lifestyle modifications, including diet and exercise. Patient education and injection training provided.     Worked with patient to create SMART Goal(s):  pt will continue to work towards these goals.     1. Increase water intake to 80 oz of water a day   2. Continue to walk 45 minutes 4 times a week    Will follow-up with patient in 4 weeks, or sooner if needed.     Yumiko Marcum PharmD  10/26/2023  14:45 EDT

## 2023-11-16 ENCOUNTER — OFFICE VISIT (OUTPATIENT)
Dept: FAMILY MEDICINE CLINIC | Facility: CLINIC | Age: 53
End: 2023-11-16
Payer: COMMERCIAL

## 2023-11-16 VITALS
BODY MASS INDEX: 25.52 KG/M2 | HEART RATE: 57 BPM | WEIGHT: 162.6 LBS | TEMPERATURE: 98.4 F | OXYGEN SATURATION: 100 % | DIASTOLIC BLOOD PRESSURE: 78 MMHG | HEIGHT: 67 IN | SYSTOLIC BLOOD PRESSURE: 110 MMHG

## 2023-11-16 DIAGNOSIS — Z12.11 SCREEN FOR COLON CANCER: Primary | ICD-10-CM

## 2023-11-16 DIAGNOSIS — E66.3 OVERWEIGHT (BMI 25.0-29.9): ICD-10-CM

## 2023-11-16 NOTE — PROGRESS NOTES
"Chief Complaint  Obesity    Subjective          Karla Connell presents to [facility]  History of Present Illness    Patient here to follow up on weight loss. States she is doing well with medication a this time. Has lost 8 lbs since last visit. Denies any adverse side effects. Will order repeat labs.     Review of Systems      Objective   Vital Signs:   /78 (BP Location: Right arm, Patient Position: Sitting)   Pulse 57   Temp 98.4 °F (36.9 °C) (Temporal)   Ht 168.9 cm (66.5\")   Wt 73.8 kg (162 lb 9.6 oz)   SpO2 100%   BMI 25.85 kg/m²     Physical Exam  Constitutional:       General: She is not in acute distress.     Appearance: Normal appearance. She is well-developed and well-groomed. She is not ill-appearing, toxic-appearing or diaphoretic.   HENT:      Head: Normocephalic.      Nose: Nose normal. No congestion or rhinorrhea.      Mouth/Throat:      Mouth: Mucous membranes are moist.      Pharynx: Oropharynx is clear. No oropharyngeal exudate or posterior oropharyngeal erythema.   Eyes:      General: Lids are normal.         Right eye: No discharge.         Left eye: No discharge.      Extraocular Movements: Extraocular movements intact.      Pupils: Pupils are equal, round, and reactive to light.   Neck:      Vascular: No carotid bruit.   Cardiovascular:      Rate and Rhythm: Normal rate and regular rhythm.      Pulses: Normal pulses.      Heart sounds: Normal heart sounds. No murmur heard.     No friction rub. No gallop.   Pulmonary:      Effort: Pulmonary effort is normal. No respiratory distress.      Breath sounds: Normal breath sounds. No stridor. No wheezing, rhonchi or rales.   Chest:      Chest wall: No tenderness.   Abdominal:      General: Bowel sounds are normal. There is no distension.      Palpations: Abdomen is soft. There is no mass.      Tenderness: There is no abdominal tenderness. There is no right CVA tenderness, left CVA tenderness, guarding or " rebound.      Hernia: No hernia is present.   Musculoskeletal:         General: No swelling or tenderness. Normal range of motion.      Cervical back: Normal range of motion and neck supple. No rigidity or tenderness.      Right lower leg: No edema.      Left lower leg: No edema.   Lymphadenopathy:      Cervical: No cervical adenopathy.   Skin:     General: Skin is warm.      Capillary Refill: Capillary refill takes less than 2 seconds.      Coloration: Skin is not jaundiced.      Findings: No bruising, erythema or rash.   Neurological:      General: No focal deficit present.      Mental Status: She is alert and oriented to person, place, and time.      Motor: Motor function is intact. No weakness.      Coordination: Coordination is intact.      Gait: Gait is intact. Gait normal.   Psychiatric:         Attention and Perception: Attention normal.         Mood and Affect: Mood normal.         Speech: Speech normal.         Behavior: Behavior normal.         Cognition and Memory: Cognition normal.         Judgment: Judgment normal.        Result Review :                 Assessment and Plan    Diagnoses and all orders for this visit:    1. Screen for colon cancer (Primary)  -     Cologuard - Stool, Per Rectum; Future    2. Overweight (BMI 25.0-29.9)  Comments:  continue wegovy  Orders:  -     CBC Auto Differential; Future  -     Comprehensive Metabolic Panel; Future  -     Hemoglobin A1c; Future  -     Lipid Panel; Future  -     T4, Free; Future  -     TSH; Future      Patient's Body mass index is 25.85 kg/m². indicating that she is overweight (BMI 25-29.9). Patient's (Body mass index is 25.85 kg/m².) indicates that they are overweight with health conditions that include none . Weight is unchanged. BMI is is above average; BMI management plan is completed. We discussed low calorie, low carb based diet program, portion control, and increasing exercise. .    Follow Up   Return in about 3 months (around 2/16/2024), or if  symptoms worsen or fail to improve.  Patient was given instructions and counseling regarding her condition or for health maintenance advice. Please see specific information pulled into the AVS if appropriate.     This document has been electronically signed by CATE Boston  November 20, 2023 21:59 EST

## 2023-11-20 ENCOUNTER — DISEASE STATE MANAGEMENT VISIT (OUTPATIENT)
Dept: PHARMACY | Facility: HOSPITAL | Age: 53
End: 2023-11-20
Payer: COMMERCIAL

## 2023-11-20 VITALS
WEIGHT: 164 LBS | BODY MASS INDEX: 25.74 KG/M2 | HEART RATE: 56 BPM | SYSTOLIC BLOOD PRESSURE: 128 MMHG | DIASTOLIC BLOOD PRESSURE: 78 MMHG | HEIGHT: 67 IN

## 2023-11-20 RX ORDER — LEVOTHYROXINE SODIUM 0.1 MG/1
100 TABLET ORAL DAILY
Qty: 30 TABLET | Refills: 2 | Status: SHIPPED | OUTPATIENT
Start: 2023-11-20

## 2023-11-20 RX ORDER — SEMAGLUTIDE 2.4 MG/.75ML
2.4 INJECTION, SOLUTION SUBCUTANEOUS WEEKLY
Qty: 3 ML | Refills: 0 | Status: SHIPPED | OUTPATIENT
Start: 2023-11-20

## 2023-11-20 NOTE — PROGRESS NOTES
[facility]  Weight Management Program      Karla Connell is a 53 y.o. female referred to the [facility] by Tiffani Quezada (3/9/23) for clinical pharmacy and specialty pharmacy management of Wegovy for weight management. Karla Connell has previously tried walking, eating healthy, and lifestyle changes for weight loss.  Current weight loss efforts include walking 2-4 times a week 30-45 minutes and Wegovy 2.4mg. Patient had her thyroid removed in . Patient reports that she has lost ~100 lbs over the years by walking.    Patient is currently on Wegovy 2.4 mg weekly. Patient denies any lumps/swelling/hoarseness in neck/throat or abdominal pain. Patient reports tolerating medication well without any side effects. Patient denies any trouble giving injection or any missed doses. Patient reported she had all of her teeth pulled on 11/15/23 and therefore diet has changed. Patient reports she has eaten well but has changed to more soft diet. She also reports she has supplemented with protein shakes and meal replacements.      Pt just saw referring provider, Tiffani, on 23 and patient reported that tiffani was good for her to continue with Wegovy as long as patient was comfortable that she was receiving adequate nutrition with recent dental extractions.    Patient reports a goal weight of 150 lbs.      Relevant Past Medical History and Co-morbidities  Past Medical History:   Diagnosis Date    Anxiety     Arthritis     Back problem     Bladder disorder     Bradycardia     Bulging lumbar disc     Disease of thyroid gland     Headache     Hx of migraines     Neuromuscular disorder      Social History     Socioeconomic History    Marital status: Single   Tobacco Use    Smoking status: Former     Packs/day: 2.00     Years: 20.00     Additional pack years: 0.00     Total pack years: 40.00     Types: Cigarettes     Quit date: 2015     Years since quittin.2    Smokeless  tobacco: Never   Vaping Use    Vaping Use: Former   Substance and Sexual Activity    Alcohol use: Never    Drug use: Never    Sexual activity: Yes     Partners: Male     Birth control/protection: Other, None       Allergies  Patient has no known allergies.    Current Medication List    Current Outpatient Medications:     amoxicillin (AMOXIL) 250 MG capsule, Take 1 capsule by mouth Every 8 (Eight) Hours., Disp: 28 capsule, Rfl: 0    buPROPion SR (Wellbutrin SR) 200 MG 12 hr tablet, Take 1 tablet(s) by mouth 2 times dailiy, Disp: 180 tablet, Rfl: 2    chlorhexidine (Peridex) 0.12 % solution, RINSE MOUTH WITH 15ML (1 CAPFUL) FOR 30 SECONDS IN THE MORNING AND EVENING AFTER TOOTHBRUSHING. EXPECTORATE AFTER RINSING, DO NOT SWALLOW., Disp: 473 mL, Rfl: 0    cyclobenzaprine (FEXMID) 7.5 MG tablet, Take 1 tablet (7.5 mg) by mouth twice per day as needed, Disp: 20 tablet, Rfl: 2    estrogen, conjugated,-medroxyprogesterone (Prempro) 0.3-1.5 MG per tablet, Take 1 tablet by mouth daily., Disp: 28 tablet, Rfl: 3    gabapentin (Neurontin) 600 MG tablet, Take 1 tablet by mouth 3 times a day., Disp: 90 tablet, Rfl: 2    HYDROcodone-acetaminophen (NORCO) 7.5-325 MG per tablet, Take 1 tablet by mouth every 6 hours as needed, Disp: 12 tablet, Rfl: 0    hydrOXYzine (ATARAX) 50 MG tablet, Take 1 tablet by mouth 2 (Two) Times a Day., Disp: 180 tablet, Rfl: 2    levothyroxine (Synthroid) 100 MCG tablet, Take 1 tablet by mouth Daily., Disp: 30 tablet, Rfl: 2    LORazepam (Ativan) 1 MG tablet, Take 1 tablet by mouth Daily., Disp: 2 tablet, Rfl: 0    montelukast (SINGULAIR) 10 MG tablet, TAKE 1 TABLET(S) BY MOUTH EACH EVENING, Disp: 90 tablet, Rfl: 2    naproxen (NAPROSYN) 375 MG tablet, Take 1 tablet by mouth every 6 hours as needed for pain, Disp: 24 tablet, Rfl: 0    Omega-3 Fatty Acids (fish oil) 1000 MG capsule capsule, Take 1 capsule by mouth 2 (Two) Times a Day., Disp: , Rfl:     oxybutynin (DITROPAN) 5 MG tablet, Take 1 tablet(s) by  "mouth 2 times daily, Disp: 180 tablet, Rfl: 2    Semaglutide-Weight Management (Wegovy) 2.4 MG/0.75ML solution auto-injector, Inject 2.4 mg under the skin into the appropriate area as directed 1 (One) Time Per Week., Disp: 3 mL, Rfl: 0    SUMAtriptan (Imitrex) 25 MG tablet, Take 1 tablet by mouth as needed for migraine, may repeat every 2 hours as needed., Disp: 27 tablet, Rfl: 2    Drug Interactions  Wegovy + Prempro -  may diminish the therapeutic effect of Antidiabetic Agents.  Wegovy + Levothyroxine- Wegovy can increase serum concentration of Levothyroxine    Relevant Laboratory Values  Lab Results   Component Value Date    CHOL 164 08/30/2023    TRIG 95 08/30/2023    HDL 52 08/30/2023    LDL 94 08/30/2023     There is no height or weight on file to calculate BMI.    Vaccinations:   Patient recommended to keep up with routine vaccinations.     Goals of Therapy  Clinical Goals or Therapeutic Targets: 10% weight loss goal      Date 11/11/22 12/9/22 1/5/23 1/19/23 2/9/23 3/9/23 4/6/23 5/4/23 6/1/23   Weight (lb) 206 lbs 204.4 lbs 197.8 lb 196.2 lb 194.8 lb 191.2lb 190.2lb 184.4 lb 181.8 lb   BMI kg/ 32.75 32.5 31.45 31.19 30.97 30.39 30.27 29.32 28.9   Waist Circumference (in)  45 in 47 in 46\"   46.5\"  *changing to Wegovy when PA goes through 45\" 42\" 42\" 41\" 41\"     Date 6/30/23 7/28/23 8/31/23 9/28/23 10/26/23 11/20/23   Weight (lb) 177.2lb 173.6 lb 168.8 lb 168.2 lb 165.4 lb 164 lb   BMI kg/ 28.17 27.60 26.84 26.74 26.30 26.07   Waist Circumference (in)  40 in 42.8\" 41\" 41.5\" 41\" 42\"       Medication Assessment & Plan    Patient's current BMI is 26.07, which is considered overweight. She has lost 42 lbs overall. Congratulated patient on her success thus far!     Will re-order Wegovy 2.4 mg SC weekly.     Discussed lifestyle modifications, including diet and exercise. Patient education and injection training provided.      Pt has next follow up with referring provided in  February 2024.     Worked with patient to " create SMART Goal(s):  pt will continue to work towards these goals.     1. Increase water intake to 80 oz of water a day   2. Continue to walk 45 minutes 4 times a week    Will follow-up with patient in 4 weeks, or sooner if needed.     Treasure Arguello. Zeferino, PharmD  11/20/2023  14:00 EST

## 2023-12-18 ENCOUNTER — LAB (OUTPATIENT)
Dept: LAB | Facility: HOSPITAL | Age: 53
End: 2023-12-18
Payer: COMMERCIAL

## 2023-12-18 ENCOUNTER — DISEASE STATE MANAGEMENT VISIT (OUTPATIENT)
Dept: PHARMACY | Facility: HOSPITAL | Age: 53
End: 2023-12-18
Payer: COMMERCIAL

## 2023-12-18 VITALS
HEART RATE: 55 BPM | DIASTOLIC BLOOD PRESSURE: 62 MMHG | WEIGHT: 162.6 LBS | HEIGHT: 67 IN | BODY MASS INDEX: 25.52 KG/M2 | SYSTOLIC BLOOD PRESSURE: 109 MMHG

## 2023-12-18 DIAGNOSIS — E66.3 OVERWEIGHT (BMI 25.0-29.9): ICD-10-CM

## 2023-12-18 LAB
ALBUMIN SERPL-MCNC: 4.3 G/DL (ref 3.5–5.2)
ALBUMIN/GLOB SERPL: 2 G/DL
ALP SERPL-CCNC: 65 U/L (ref 39–117)
ALT SERPL W P-5'-P-CCNC: 14 U/L (ref 1–33)
ANION GAP SERPL CALCULATED.3IONS-SCNC: 9.3 MMOL/L (ref 5–15)
AST SERPL-CCNC: 23 U/L (ref 1–32)
BASOPHILS # BLD AUTO: 0.05 10*3/MM3 (ref 0–0.2)
BASOPHILS NFR BLD AUTO: 0.7 % (ref 0–1.5)
BILIRUB SERPL-MCNC: <0.2 MG/DL (ref 0–1.2)
BUN SERPL-MCNC: 12 MG/DL (ref 6–20)
BUN/CREAT SERPL: 11.2 (ref 7–25)
CALCIUM SPEC-SCNC: 9.5 MG/DL (ref 8.6–10.5)
CHLORIDE SERPL-SCNC: 102 MMOL/L (ref 98–107)
CHOLEST SERPL-MCNC: 168 MG/DL (ref 0–200)
CO2 SERPL-SCNC: 26.7 MMOL/L (ref 22–29)
CREAT SERPL-MCNC: 1.07 MG/DL (ref 0.57–1)
DEPRECATED RDW RBC AUTO: 42.1 FL (ref 37–54)
EGFRCR SERPLBLD CKD-EPI 2021: 61.9 ML/MIN/1.73
EOSINOPHIL # BLD AUTO: 0.17 10*3/MM3 (ref 0–0.4)
EOSINOPHIL NFR BLD AUTO: 2.4 % (ref 0.3–6.2)
ERYTHROCYTE [DISTWIDTH] IN BLOOD BY AUTOMATED COUNT: 13.1 % (ref 12.3–15.4)
GLOBULIN UR ELPH-MCNC: 2.1 GM/DL
GLUCOSE SERPL-MCNC: 86 MG/DL (ref 65–99)
HBA1C MFR BLD: 4.9 % (ref 4.8–5.6)
HCT VFR BLD AUTO: 41.4 % (ref 34–46.6)
HDLC SERPL-MCNC: 64 MG/DL (ref 40–60)
HGB BLD-MCNC: 13.5 G/DL (ref 12–15.9)
IMM GRANULOCYTES # BLD AUTO: 0.03 10*3/MM3 (ref 0–0.05)
IMM GRANULOCYTES NFR BLD AUTO: 0.4 % (ref 0–0.5)
LDLC SERPL CALC-MCNC: 86 MG/DL (ref 0–100)
LDLC/HDLC SERPL: 1.31 {RATIO}
LYMPHOCYTES # BLD AUTO: 1.85 10*3/MM3 (ref 0.7–3.1)
LYMPHOCYTES NFR BLD AUTO: 25.9 % (ref 19.6–45.3)
MCH RBC QN AUTO: 29 PG (ref 26.6–33)
MCHC RBC AUTO-ENTMCNC: 32.6 G/DL (ref 31.5–35.7)
MCV RBC AUTO: 88.8 FL (ref 79–97)
MONOCYTES # BLD AUTO: 0.73 10*3/MM3 (ref 0.1–0.9)
MONOCYTES NFR BLD AUTO: 10.2 % (ref 5–12)
NEUTROPHILS NFR BLD AUTO: 4.31 10*3/MM3 (ref 1.7–7)
NEUTROPHILS NFR BLD AUTO: 60.4 % (ref 42.7–76)
NRBC BLD AUTO-RTO: 0 /100 WBC (ref 0–0.2)
PLATELET # BLD AUTO: 285 10*3/MM3 (ref 140–450)
PMV BLD AUTO: 10.3 FL (ref 6–12)
POTASSIUM SERPL-SCNC: 4.6 MMOL/L (ref 3.5–5.2)
PROT SERPL-MCNC: 6.4 G/DL (ref 6–8.5)
RBC # BLD AUTO: 4.66 10*6/MM3 (ref 3.77–5.28)
SODIUM SERPL-SCNC: 138 MMOL/L (ref 136–145)
T4 FREE SERPL-MCNC: 1.49 NG/DL (ref 0.93–1.7)
TRIGL SERPL-MCNC: 101 MG/DL (ref 0–150)
TSH SERPL DL<=0.05 MIU/L-ACNC: 0.14 UIU/ML (ref 0.27–4.2)
VLDLC SERPL-MCNC: 18 MG/DL (ref 5–40)
WBC NRBC COR # BLD AUTO: 7.14 10*3/MM3 (ref 3.4–10.8)

## 2023-12-18 PROCEDURE — 80061 LIPID PANEL: CPT

## 2023-12-18 PROCEDURE — 84439 ASSAY OF FREE THYROXINE: CPT

## 2023-12-18 PROCEDURE — 80050 GENERAL HEALTH PANEL: CPT

## 2023-12-18 PROCEDURE — 83036 HEMOGLOBIN GLYCOSYLATED A1C: CPT

## 2023-12-18 PROCEDURE — 36415 COLL VENOUS BLD VENIPUNCTURE: CPT

## 2023-12-18 RX ORDER — SEMAGLUTIDE 2.4 MG/.75ML
2.4 INJECTION, SOLUTION SUBCUTANEOUS WEEKLY
Qty: 3 ML | Refills: 0 | Status: SHIPPED | OUTPATIENT
Start: 2023-12-18

## 2023-12-18 NOTE — PROGRESS NOTES
[facility]  Weight Management Program      Karla Connell is a 53 y.o. female referred to the [facility] by Tiffani Quezada (3/9/23) for clinical pharmacy and specialty pharmacy management of Wegovy for weight management. Karla Connell has previously tried walking, eating healthy, and lifestyle changes for weight loss.  Current weight loss efforts include walking 2-4 times a week 30-45 minutes and Wegovy 2.4mg. Patient had her thyroid removed in . Patient reports that she has lost ~100 lbs over the years by walking.    Patient is currently on Wegovy 2.4 mg weekly. Patient denies any lumps/swelling/hoarseness in neck/throat or abdominal pain. Patient reports tolerating medication well without any side effects. Patient denies any trouble giving injection or any missed doses. Patient reports that she is still on a soft food diet since having her teeth pulled on 11/15/23.  She also reports she has supplemented with protein shakes and meal replacements.  Patient reports that she is doing really well with water intake and she is still consistently walking.    Patient reports a goal weight of 150 lbs.      Relevant Past Medical History and Co-morbidities  Past Medical History:   Diagnosis Date    Anxiety     Arthritis     Back problem     Bladder disorder     Bradycardia     Bulging lumbar disc     Disease of thyroid gland     Headache     Hx of migraines     Neuromuscular disorder      Social History     Socioeconomic History    Marital status: Single   Tobacco Use    Smoking status: Former     Packs/day: 2.00     Years: 20.00     Additional pack years: 0.00     Total pack years: 40.00     Types: Cigarettes     Quit date: 2015     Years since quittin.3    Smokeless tobacco: Never   Vaping Use    Vaping Use: Former   Substance and Sexual Activity    Alcohol use: Never    Drug use: Never    Sexual activity: Yes     Partners: Male     Birth control/protection: Other, None        Allergies  Patient has no known allergies.    Current Medication List    Current Outpatient Medications:     amoxicillin (AMOXIL) 250 MG capsule, Take 1 capsule by mouth Every 8 (Eight) Hours., Disp: 28 capsule, Rfl: 0    buPROPion SR (Wellbutrin SR) 200 MG 12 hr tablet, Take 1 tablet(s) by mouth 2 times dailiy, Disp: 180 tablet, Rfl: 2    chlorhexidine (Peridex) 0.12 % solution, RINSE MOUTH WITH 15ML (1 CAPFUL) FOR 30 SECONDS IN THE MORNING AND EVENING AFTER TOOTHBRUSHING. EXPECTORATE AFTER RINSING, DO NOT SWALLOW., Disp: 473 mL, Rfl: 0    cyclobenzaprine (FEXMID) 7.5 MG tablet, Take 1 tablet (7.5 mg) by mouth twice per day as needed, Disp: 20 tablet, Rfl: 2    estrogen, conjugated,-medroxyprogesterone (Prempro) 0.3-1.5 MG per tablet, Take 1 tablet by mouth daily., Disp: 28 tablet, Rfl: 3    gabapentin (Neurontin) 600 MG tablet, Take 1 tablet by mouth 3 times a day., Disp: 90 tablet, Rfl: 2    HYDROcodone-acetaminophen (NORCO) 7.5-325 MG per tablet, Take 1 tablet by mouth every 6 hours as needed, Disp: 12 tablet, Rfl: 0    hydrOXYzine (ATARAX) 50 MG tablet, Take 1 tablet by mouth 2 (Two) Times a Day., Disp: 180 tablet, Rfl: 2    levothyroxine (Synthroid) 100 MCG tablet, Take 1 tablet by mouth Daily., Disp: 30 tablet, Rfl: 2    LORazepam (Ativan) 1 MG tablet, Take 1 tablet by mouth Daily., Disp: 2 tablet, Rfl: 0    montelukast (SINGULAIR) 10 MG tablet, TAKE 1 TABLET(S) BY MOUTH EACH EVENING, Disp: 90 tablet, Rfl: 2    naproxen (NAPROSYN) 375 MG tablet, Take 1 tablet by mouth every 6 hours as needed for pain, Disp: 24 tablet, Rfl: 0    Omega-3 Fatty Acids (fish oil) 1000 MG capsule capsule, Take 1 capsule by mouth 2 (Two) Times a Day., Disp: , Rfl:     oxybutynin (DITROPAN) 5 MG tablet, Take 1 tablet(s) by mouth 2 times daily, Disp: 180 tablet, Rfl: 2    Semaglutide-Weight Management (Wegovy) 2.4 MG/0.75ML solution auto-injector, Inject 2.4 mg under the skin into the appropriate area as directed 1 (One) Time  "Per Week., Disp: 3 mL, Rfl: 0    SUMAtriptan (Imitrex) 25 MG tablet, Take 1 tablet by mouth as needed for migraine, may repeat every 2 hours as needed., Disp: 27 tablet, Rfl: 2    Drug Interactions  Wegovy + Prempro -  may diminish the therapeutic effect of Antidiabetic Agents.  Wegovy + Levothyroxine- Wegovy can increase serum concentration of Levothyroxine    Relevant Laboratory Values  Lab Results   Component Value Date    CHOL 164 08/30/2023    TRIG 95 08/30/2023    HDL 52 08/30/2023    LDL 94 08/30/2023     There is no height or weight on file to calculate BMI.    Vaccinations:   Patient recommended to keep up with routine vaccinations.     Goals of Therapy  Clinical Goals or Therapeutic Targets: 10% weight loss goal      Date 11/11/22 12/9/22 1/5/23 1/19/23 2/9/23 3/9/23 4/6/23 5/4/23 6/1/23   Weight (lb) 206 lbs 204.4 lbs 197.8 lb 196.2 lb 194.8 lb 191.2lb 190.2lb 184.4 lb 181.8 lb   BMI kg/ 32.75 32.5 31.45 31.19 30.97 30.39 30.27 29.32 28.9   Waist Circumference (in)  45 in 47 in 46\"   46.5\"  *changing to Wegovy when PA goes through 45\" 42\" 42\" 41\" 41\"     Date 6/30/23 7/28/23 8/31/23 9/28/23 10/26/23 11/20/23 12/18/23   Weight (lb) 177.2lb 173.6 lb 168.8 lb 168.2 lb 165.4 lb 164 lb 162.6 lb   BMI kg/ 28.17 27.60 26.84 26.74 26.30 26.07 25.85   Waist Circumference (in)  40 in 42.8\" 41\" 41.5\" 41\" 42\" 39\"       Medication Assessment & Plan    Patient's current BMI is 25.85, which is considered overweight. She has lost 43.4 lbs overall. Congratulated patient on her success thus far!     Will re-order Wegovy 2.4 mg SC weekly.     Discussed lifestyle modifications, including diet and exercise. Patient education and injection training provided.     Worked with patient to create SMART Goal(s):  pt will continue to work towards these goals.     1. Increase water intake to 80 oz of water a day   2. Continue to walk 45 minutes 4 times a week    Will follow-up with patient in 4 weeks, or sooner if needed.     Yumiko " Jossue PharmBERNADETTE  12/18/2023  15:45 EST

## 2023-12-21 RX ORDER — LEVOTHYROXINE SODIUM 88 UG/1
88 TABLET ORAL DAILY
Qty: 90 TABLET | Refills: 0 | Status: SHIPPED | OUTPATIENT
Start: 2023-12-21

## 2023-12-22 ENCOUNTER — TELEPHONE (OUTPATIENT)
Dept: FAMILY MEDICINE CLINIC | Facility: CLINIC | Age: 53
End: 2023-12-22
Payer: COMMERCIAL

## 2023-12-22 NOTE — TELEPHONE ENCOUNTER
----- Message from CATE Boston sent at 12/21/2023 10:05 PM EST -----  Please call the patient regarding her abnormal result.her tsh is low, we need to decrease her synthroid to 88mcg. Her kidney function is mildly elevated, she needs to increase her water intake. Other labs stable.

## 2024-01-15 ENCOUNTER — DISEASE STATE MANAGEMENT VISIT (OUTPATIENT)
Dept: PHARMACY | Facility: HOSPITAL | Age: 54
End: 2024-01-15
Payer: COMMERCIAL

## 2024-01-15 VITALS
BODY MASS INDEX: 25.18 KG/M2 | HEIGHT: 67 IN | DIASTOLIC BLOOD PRESSURE: 73 MMHG | HEART RATE: 51 BPM | SYSTOLIC BLOOD PRESSURE: 116 MMHG | WEIGHT: 160.4 LBS

## 2024-01-15 RX ORDER — SEMAGLUTIDE 2.4 MG/.75ML
2.4 INJECTION, SOLUTION SUBCUTANEOUS WEEKLY
Qty: 3 ML | Refills: 0 | Status: SHIPPED | OUTPATIENT
Start: 2024-01-15

## 2024-01-15 NOTE — PROGRESS NOTES
[facility]  Weight Management Program      Karla Connell is a 53 y.o. female referred to the [facility] by Tiffani Quezada (3/9/23) for clinical pharmacy and specialty pharmacy management of Wegovy for weight management. Karla Connell has previously tried walking, eating healthy, and lifestyle changes for weight loss.  Current weight loss efforts include walking 2-4 times a week 30-45 minutes and Wegovy 2.4mg. Patient had her thyroid removed in . Patient reports that she has lost ~100 lbs over the years by walking.    Patient is currently on Wegovy 2.4 mg weekly. Patient denies any lumps/swelling/hoarseness in neck/throat or abdominal pain. Patient reports tolerating medication well without any side effects. Patient denies any trouble giving injection or any missed doses. Patient reports that she is still on a soft food diet since having her teeth pulled on 11/15/23.  She also reports she has supplemented with protein shakes and meal replacements.  Patient reports that she is doing really well with water intake and she is still consistently walking.    Patient reports a goal weight of 150 lbs.      Relevant Past Medical History and Co-morbidities  Past Medical History:   Diagnosis Date    Anxiety     Arthritis     Back problem     Bladder disorder     Bradycardia     Bulging lumbar disc     Disease of thyroid gland     Headache     Hx of migraines     Neuromuscular disorder      Social History     Socioeconomic History    Marital status: Single   Tobacco Use    Smoking status: Former     Packs/day: 2.00     Years: 20.00     Additional pack years: 0.00     Total pack years: 40.00     Types: Cigarettes     Quit date: 2015     Years since quittin.4    Smokeless tobacco: Never   Vaping Use    Vaping Use: Former   Substance and Sexual Activity    Alcohol use: Never    Drug use: Never    Sexual activity: Yes     Partners: Male     Birth control/protection: Other, None        Allergies  Patient has no known allergies.    Current Medication List    Current Outpatient Medications:     amoxicillin (AMOXIL) 250 MG capsule, Take 1 capsule by mouth Every 8 (Eight) Hours., Disp: 28 capsule, Rfl: 0    buPROPion SR (Wellbutrin SR) 200 MG 12 hr tablet, Take 1 tablet(s) by mouth 2 times dailiy, Disp: 180 tablet, Rfl: 2    buPROPion SR (Wellbutrin SR) 200 MG 12 hr tablet, Take 1 tablet by mouth twice daily, Disp: 180 tablet, Rfl: 2    chlorhexidine (Peridex) 0.12 % solution, RINSE MOUTH WITH 15ML (1 CAPFUL) FOR 30 SECONDS IN THE MORNING AND EVENING AFTER TOOTHBRUSHING. EXPECTORATE AFTER RINSING, DO NOT SWALLOW., Disp: 473 mL, Rfl: 0    cyclobenzaprine (FEXMID) 7.5 MG tablet, Take 1 tablet (7.5 mg) by mouth twice per day as needed, Disp: 20 tablet, Rfl: 2    cyclobenzaprine (FEXMID) 7.5 MG tablet, Take 1 tablet by mouth twice per day as needed, Disp: 20 tablet, Rfl: 2    cyclobenzaprine (FLEXERIL) 5 MG tablet, Take 1 tablet by mouth 3 times a day., Disp: 90 tablet, Rfl: 3    estrogen, conjugated,-medroxyprogesterone (Prempro) 0.3-1.5 MG per tablet, Take 1 tablet by mouth daily., Disp: 28 tablet, Rfl: 3    gabapentin (Neurontin) 600 MG tablet, Take 1 tablet by mouth three times daily, Disp: 90 tablet, Rfl: 2    HYDROcodone-acetaminophen (NORCO) 7.5-325 MG per tablet, Take 1 tablet by mouth every 6 hours as needed, Disp: 12 tablet, Rfl: 0    hydrOXYzine (ATARAX) 50 MG tablet, Take 1 tablet by mouth 2 (Two) Times a Day., Disp: 180 tablet, Rfl: 2    hydrOXYzine (ATARAX) 50 MG tablet, Take 1 tablet by mouth twice daily, Disp: 180 tablet, Rfl: 2    levothyroxine (Synthroid) 88 MCG tablet, Take 1 tablet by mouth Daily., Disp: 90 tablet, Rfl: 0    LORazepam (Ativan) 1 MG tablet, Take 1 tablet by mouth Daily., Disp: 2 tablet, Rfl: 0    montelukast (SINGULAIR) 10 MG tablet, TAKE 1 TABLET(S) BY MOUTH EACH EVENING, Disp: 90 tablet, Rfl: 2    naproxen (NAPROSYN) 375 MG tablet, Take 1 tablet by mouth every 6  "hours as needed for pain, Disp: 24 tablet, Rfl: 0    Omega-3 Fatty Acids (fish oil) 1000 MG capsule capsule, Take 1 capsule by mouth 2 (Two) Times a Day., Disp: , Rfl:     oseltamivir (Tamiflu) 75 MG capsule, Take 1 capsule by mouth Daily for 5 days., Disp: 5 capsule, Rfl: 0    oxybutynin (DITROPAN) 5 MG tablet, Take 1 tablet(s) by mouth 2 times daily, Disp: 180 tablet, Rfl: 2    Semaglutide-Weight Management (Wegovy) 2.4 MG/0.75ML solution auto-injector, Inject 2.4 mg under the skin into the appropriate area as directed 1 (One) Time Per Week., Disp: 3 mL, Rfl: 0    SUMAtriptan (Imitrex) 25 MG tablet, Take 1 tablet by mouth as needed for migraine, may repeat every 2 hours as needed., Disp: 27 tablet, Rfl: 2    Drug Interactions  Wegovy + Prempro -  may diminish the therapeutic effect of Antidiabetic Agents.  Wegovy + Levothyroxine- Wegovy can increase serum concentration of Levothyroxine    Relevant Laboratory Values  Lab Results   Component Value Date    CHOL 168 12/18/2023    TRIG 101 12/18/2023    HDL 64 (H) 12/18/2023    LDL 86 12/18/2023     Body mass index is 25.5 kg/m².    Vaccinations:   Patient recommended to keep up with routine vaccinations.     Goals of Therapy  Clinical Goals or Therapeutic Targets: 10% weight loss goal      Date 11/11/22 12/9/22 1/5/23 1/19/23 2/9/23 3/9/23 4/6/23 5/4/23 6/1/23   Weight (lb) 206 lbs 204.4 lbs 197.8 lb 196.2 lb 194.8 lb 191.2lb 190.2lb 184.4 lb 181.8 lb   BMI kg/ 32.75 32.5 31.45 31.19 30.97 30.39 30.27 29.32 28.9   Waist Circumference (in)  45 in 47 in 46\"   46.5\"  *changing to Wegovy when PA goes through 45\" 42\" 42\" 41\" 41\"     Date 6/30/23 7/28/23 8/31/23 9/28/23 10/26/23 11/20/23 12/18/23 1/15/24   Weight (lb) 177.2lb 173.6 lb 168.8 lb 168.2 lb 165.4 lb 164 lb 162.6 lb 160.4 lb   BMI kg/ 28.17 27.60 26.84 26.74 26.30 26.07 25.85 25.50   Waist Circumference (in)  40 in 42.8\" 41\" 41.5\" 41\" 42\" 39\" 41.5\"       Medication Assessment & Plan    Patient's current BMI is " 25.50, which is considered overweight. She has lost 45.6 lbs overall. Congratulated patient on her success thus far!     Will re-order Wegovy 2.4 mg SC weekly.     Discussed lifestyle modifications, including diet and exercise. Patient education and injection training provided.     Reviewed labs from 12/18/23. Tiffani has also already reviewed and adjusted Synthroid dose.    Worked with patient to create SMART Goal(s):  pt will continue to work towards these goals.   1. Increase water intake to 80 oz of water a day   2. Continue to walk 45 minutes 4 times a week    Will follow-up with patient in 4 weeks, or sooner if needed.     Yumiko Marcum, PharmD  1/15/2024  13:44 EST

## 2024-02-13 ENCOUNTER — DISEASE STATE MANAGEMENT VISIT (OUTPATIENT)
Dept: PHARMACY | Facility: HOSPITAL | Age: 54
End: 2024-02-13
Payer: COMMERCIAL

## 2024-02-13 VITALS
HEART RATE: 59 BPM | DIASTOLIC BLOOD PRESSURE: 85 MMHG | BODY MASS INDEX: 25.8 KG/M2 | WEIGHT: 164.4 LBS | HEIGHT: 67 IN | SYSTOLIC BLOOD PRESSURE: 138 MMHG

## 2024-02-13 RX ORDER — SEMAGLUTIDE 2.4 MG/.75ML
2.4 INJECTION, SOLUTION SUBCUTANEOUS WEEKLY
Qty: 3 ML | Refills: 0 | Status: SHIPPED | OUTPATIENT
Start: 2024-02-13

## 2024-02-19 ENCOUNTER — TELEPHONE (OUTPATIENT)
Dept: FAMILY MEDICINE CLINIC | Facility: CLINIC | Age: 54
End: 2024-02-19

## 2024-02-19 NOTE — TELEPHONE ENCOUNTER
Caller: Karla Connell    Relationship: Self    Best call back number: [phone]     What was the call regarding:   PATIENT STATED THAT HER MEDICATION IS NEEDING A PRIOR AUTHORIZATION FOR INSURANCE TO COVER   emaglutide-Weight Management (Wegovy) 2.4 MG/0.75ML solution auto-injector

## 2024-02-26 ENCOUNTER — OFFICE VISIT (OUTPATIENT)
Dept: FAMILY MEDICINE CLINIC | Facility: CLINIC | Age: 54
End: 2024-02-26
Payer: COMMERCIAL

## 2024-02-26 VITALS
TEMPERATURE: 98.2 F | WEIGHT: 163.2 LBS | OXYGEN SATURATION: 98 % | HEART RATE: 46 BPM | SYSTOLIC BLOOD PRESSURE: 104 MMHG | DIASTOLIC BLOOD PRESSURE: 58 MMHG | BODY MASS INDEX: 25.62 KG/M2 | HEIGHT: 67 IN | RESPIRATION RATE: 16 BRPM

## 2024-02-26 DIAGNOSIS — E66.3 OVERWEIGHT (BMI 25.0-29.9): Primary | ICD-10-CM

## 2024-02-26 RX ORDER — SEMAGLUTIDE 2.4 MG/.75ML
2.4 INJECTION, SOLUTION SUBCUTANEOUS WEEKLY
Qty: 3 ML | Refills: 0 | Status: SHIPPED | OUTPATIENT
Start: 2024-02-26

## 2024-02-26 NOTE — PROGRESS NOTES
"Chief Complaint  Obesity    Subjective          Karla Connell presents to [facility]  Obesity        Patient here to follow up on weight loss. States she is doing well with medication a this time. Denies any adverse side effects. Will order repeat labs.    Review of Systems      Objective   Vital Signs:   /58 (BP Location: Right arm, Patient Position: Sitting, Cuff Size: Adult)   Pulse (!) 46   Temp 98.2 °F (36.8 °C) (Temporal)   Resp 16   Ht 168.9 cm (66.5\")   Wt 74 kg (163 lb 3.2 oz)   SpO2 98%   BMI 25.95 kg/m²     Physical Exam  Constitutional:       General: She is not in acute distress.     Appearance: Normal appearance. She is well-developed and well-groomed. She is not ill-appearing, toxic-appearing or diaphoretic.   HENT:      Head: Normocephalic.      Nose: Nose normal. No congestion or rhinorrhea.      Mouth/Throat:      Mouth: Mucous membranes are moist.      Pharynx: Oropharynx is clear. No oropharyngeal exudate or posterior oropharyngeal erythema.   Eyes:      General: Lids are normal.         Right eye: No discharge.         Left eye: No discharge.      Extraocular Movements: Extraocular movements intact.      Pupils: Pupils are equal, round, and reactive to light.   Neck:      Vascular: No carotid bruit.   Cardiovascular:      Rate and Rhythm: Normal rate and regular rhythm.      Pulses: Normal pulses.      Heart sounds: Normal heart sounds. No murmur heard.     No friction rub. No gallop.   Pulmonary:      Effort: Pulmonary effort is normal. No respiratory distress.      Breath sounds: Normal breath sounds. No stridor. No wheezing, rhonchi or rales.   Chest:      Chest wall: No tenderness.   Abdominal:      General: Bowel sounds are normal. There is no distension.      Palpations: Abdomen is soft. There is no mass.      Tenderness: There is no abdominal tenderness. There is no right CVA tenderness, left CVA tenderness, guarding or rebound.      Hernia: " No hernia is present.   Musculoskeletal:         General: No swelling or tenderness. Normal range of motion.      Cervical back: Normal range of motion and neck supple. No rigidity or tenderness.      Right lower leg: No edema.      Left lower leg: No edema.   Lymphadenopathy:      Cervical: No cervical adenopathy.   Skin:     General: Skin is warm.      Capillary Refill: Capillary refill takes less than 2 seconds.      Coloration: Skin is not jaundiced.      Findings: No bruising, erythema or rash.   Neurological:      General: No focal deficit present.      Mental Status: She is alert and oriented to person, place, and time.      Motor: Motor function is intact. No weakness.      Coordination: Coordination is intact.      Gait: Gait is intact. Gait normal.   Psychiatric:         Attention and Perception: Attention normal.         Mood and Affect: Mood normal.         Speech: Speech normal.         Behavior: Behavior normal.         Cognition and Memory: Cognition normal.         Judgment: Judgment normal.        Result Review :                 Assessment and Plan    Diagnoses and all orders for this visit:    1. Overweight (BMI 25.0-29.9) (Primary)  -     Semaglutide-Weight Management (Wegovy) 2.4 MG/0.75ML solution auto-injector; Inject 2.4 mg under the skin into the appropriate area as directed 1 (One) Time Per Week.  Dispense: 3 mL; Refill: 0  -     CBC Auto Differential; Future  -     Comprehensive Metabolic Panel; Future  -     Hemoglobin A1c; Future  -     Lipid Panel; Future  -     TSH; Future  -     T4, Free; Future      Patient's Body mass index is 25.95 kg/m². indicating that she is overweight (BMI 25-29.9). Patient's (Body mass index is 25.95 kg/m².) indicates that they are overweight with health conditions that include none . Weight is improving with treatment. BMI is is above average; BMI management plan is completed. We discussed low calorie, low carb based diet program, portion control, and  increasing exercise. .    Follow Up   Return in about 3 months (around 5/26/2024), or if symptoms worsen or fail to improve.  Patient was given instructions and counseling regarding her condition or for health maintenance advice. Please see specific information pulled into the AVS if appropriate.     This document has been electronically signed by CATE Boston  February 26, 2024 14:28 EST

## 2024-03-12 ENCOUNTER — DISEASE STATE MANAGEMENT VISIT (OUTPATIENT)
Dept: PHARMACY | Facility: HOSPITAL | Age: 54
End: 2024-03-12
Payer: COMMERCIAL

## 2024-03-12 VITALS
SYSTOLIC BLOOD PRESSURE: 135 MMHG | BODY MASS INDEX: 25.68 KG/M2 | HEART RATE: 52 BPM | HEIGHT: 67 IN | DIASTOLIC BLOOD PRESSURE: 84 MMHG | WEIGHT: 163.6 LBS

## 2024-03-12 DIAGNOSIS — E66.3 OVERWEIGHT (BMI 25.0-29.9): ICD-10-CM

## 2024-03-12 RX ORDER — SEMAGLUTIDE 2.4 MG/.75ML
2.4 INJECTION, SOLUTION SUBCUTANEOUS WEEKLY
Qty: 3 ML | Refills: 0 | Status: SHIPPED | OUTPATIENT
Start: 2024-03-12

## 2024-03-12 RX ORDER — LEVOTHYROXINE SODIUM 88 UG/1
88 TABLET ORAL DAILY
Qty: 90 TABLET | Refills: 0 | Status: SHIPPED | OUTPATIENT
Start: 2024-03-12

## 2024-03-12 NOTE — PROGRESS NOTES
[facility]  Weight Management Program      Karla Connell is a 53 y.o. female referred to the [facility] by Tiffani Quezada (3/9/23) for clinical pharmacy and specialty pharmacy management of Wegovy for weight management. Karla Connell has previously tried walking, eating healthy, and lifestyle changes for weight loss.  Current weight loss efforts include walking 2-4 times a week 30-45 minutes and Wegovy 2.4mg. Patient had her thyroid removed in 2007. Patient reports that she has lost ~100 lbs over the years by walking.    Patient is currently on Wegovy 2.4 mg weekly. Patient denies any lumps/swelling/hoarseness in neck/throat or abdominal pain. Patient reports tolerating medication well without any side effects. Patient denies any trouble giving injection or any missed doses. Patient reports she is adapting better to dentures and that her food intake is improving.  Patient reports that she is doing really well with water intake and has not walked as much as she had been. Patient reports that she has felt more fatigued going home after work. She reports she usually get ~ 2 miles in at work and she hasn't felt like walking more after. Patient does feel that some of this may be attributed to lower heart rate that she is seeking evaluation for.     Pt HR slightly low. Patient reports she is following with Dr. Pozo soon to address as she is symptomatic sometimes. Denies any dizziness,etc currently.     Patient reports a goal weight of 150 lbs.      Relevant Past Medical History and Co-morbidities  Past Medical History:   Diagnosis Date    Anxiety     Arthritis     Back problem     Bladder disorder     Bradycardia     Bulging lumbar disc     Disease of thyroid gland     Headache     Hx of migraines     Neuromuscular disorder      Social History     Socioeconomic History    Marital status: Single   Tobacco Use    Smoking status: Former     Current packs/day: 0.00     Average  packs/day: 2.0 packs/day for 20.0 years (40.0 ttl pk-yrs)     Types: Cigarettes     Start date: 1995     Quit date: 2015     Years since quittin.5    Smokeless tobacco: Never   Vaping Use    Vaping status: Former   Substance and Sexual Activity    Alcohol use: Never    Drug use: Never    Sexual activity: Yes     Partners: Male     Birth control/protection: Other, None       Allergies  Patient has no known allergies.    Current Medication List    Current Outpatient Medications:     buPROPion SR (Wellbutrin SR) 200 MG 12 hr tablet, Take 1 tablet by mouth twice daily, Disp: 180 tablet, Rfl: 2    cyclobenzaprine (FLEXERIL) 5 MG tablet, Take 1 tablet by mouth 3 times a day., Disp: 90 tablet, Rfl: 3    estrogen, conjugated,-medroxyprogesterone (Prempro) 0.3-1.5 MG per tablet, Take 1 tablet by mouth daily., Disp: 28 tablet, Rfl: 5    gabapentin (Neurontin) 600 MG tablet, Take 1 tablet by mouth three times daily, Disp: 90 tablet, Rfl: 2    hydrOXYzine (ATARAX) 50 MG tablet, Take 1 tablet by mouth twice daily, Disp: 180 tablet, Rfl: 2    levothyroxine (Synthroid) 88 MCG tablet, Take 1 tablet by mouth Daily., Disp: 90 tablet, Rfl: 0    montelukast (SINGULAIR) 10 MG tablet, TAKE 1 TABLET(S) BY MOUTH EACH EVENING, Disp: 90 tablet, Rfl: 2    Omega-3 Fatty Acids (fish oil) 1000 MG capsule capsule, Take 1 capsule by mouth 2 (Two) Times a Day., Disp: , Rfl:     oxybutynin (DITROPAN) 5 MG tablet, Take 1 tablet(s) by mouth 2 times daily, Disp: 180 tablet, Rfl: 2    Semaglutide-Weight Management (Wegovy) 2.4 MG/0.75ML solution auto-injector, Inject 2.4 mg under the skin into the appropriate area as directed 1 (One) Time Per Week., Disp: 3 mL, Rfl: 0    SUMAtriptan (Imitrex) 25 MG tablet, Take 1 tablet by mouth as needed for migraine, may repeat every 2 hours as needed., Disp: 27 tablet, Rfl: 2    Drug Interactions  Wegovy + Prempro -  may diminish the therapeutic effect of Antidiabetic Agents.  Wegovy + Levothyroxine-  "Wegovy can increase serum concentration of Levothyroxine    Relevant Laboratory Values  Lab Results   Component Value Date    CHOL 168 12/18/2023    TRIG 101 12/18/2023    HDL 64 (H) 12/18/2023    LDL 86 12/18/2023     Body mass index is 26.01 kg/m².    Vaccinations:   Patient recommended to keep up with routine vaccinations.     Goals of Therapy  Clinical Goals or Therapeutic Targets: 10% weight loss goal      Date 11/11/22 12/9/22 1/5/23 1/19/23 2/9/23 3/9/23 4/6/23 5/4/23 6/1/23   Weight (lb) 206 lbs 204.4 lbs 197.8 lb 196.2 lb 194.8 lb 191.2lb 190.2lb 184.4 lb 181.8 lb   BMI kg/ 32.75 32.5 31.45 31.19 30.97 30.39 30.27 29.32 28.9   Waist Circumference (in)  45 in 47 in 46\"   46.5\"  *changing to Wegovy when PA goes through 45\" 42\" 42\" 41\" 41\"     Date 6/30/23 7/28/23 8/31/23 9/28/23 10/26/23 11/20/23 12/18/23 1/15/24 2/13/24 3/12/24   Weight (lb) 177.2lb 173.6 lb 168.8 lb 168.2 lb 165.4 lb 164 lb 162.6 lb 160.4 lb 164.4 lb 163.6 lb   BMI kg/ 28.17 27.60 26.84 26.74 26.30 26.07 25.85 25.50 26.14 26.01   Waist Circumference (in)  40 in 42.8\" 41\" 41.5\" 41\" 42\" 39\" 41.5\" 42\" 39.75\"       Medication Assessment & Plan    Patient's current BMI is 26.01, which is considered overweight. She has lost 45.6 lbs overall. Congratulated patient on her success thus far! Patient had some weight loss this visit and again, still overall maintaining well.     Will re-order Wegovy 2.4 mg SC weekly.     Discussed lifestyle modifications, including diet and exercise.     Patient just saw referring provider Tiffani Quezada in February. Patient reported that she placed lab work and that she will get completed prior to next follow up in May.   Pt has appt with cardiology on March 25th to discuss bradycardia.     Worked with patient to create SMART Goal(s):  pt will continue to work towards these goals.   1. Increase water intake to 80 oz of water a day   2. Continue to walk 45 minutes 4 times a week    Will follow-up with patient in 4 " weeks, or sooner if needed.     Treasure Arguello. Zeferino, PharmD  3/12/2024  14:51 EDT

## 2024-03-18 ENCOUNTER — TELEPHONE (OUTPATIENT)
Dept: FAMILY MEDICINE CLINIC | Facility: CLINIC | Age: 54
End: 2024-03-18
Payer: COMMERCIAL

## 2024-03-19 ENCOUNTER — LAB (OUTPATIENT)
Dept: LAB | Facility: HOSPITAL | Age: 54
End: 2024-03-19
Payer: COMMERCIAL

## 2024-03-19 DIAGNOSIS — E66.3 OVERWEIGHT (BMI 25.0-29.9): ICD-10-CM

## 2024-03-19 LAB
ALBUMIN SERPL-MCNC: 4.2 G/DL (ref 3.5–5.2)
ALBUMIN/GLOB SERPL: 1.6 G/DL
ALP SERPL-CCNC: 71 U/L (ref 39–117)
ALT SERPL W P-5'-P-CCNC: 15 U/L (ref 1–33)
ANION GAP SERPL CALCULATED.3IONS-SCNC: 13.3 MMOL/L (ref 5–15)
AST SERPL-CCNC: 24 U/L (ref 1–32)
BASOPHILS # BLD AUTO: 0.06 10*3/MM3 (ref 0–0.2)
BASOPHILS NFR BLD AUTO: 0.8 % (ref 0–1.5)
BILIRUB SERPL-MCNC: <0.2 MG/DL (ref 0–1.2)
BUN SERPL-MCNC: 11 MG/DL (ref 6–20)
BUN/CREAT SERPL: 10.6 (ref 7–25)
CALCIUM SPEC-SCNC: 9.2 MG/DL (ref 8.6–10.5)
CHLORIDE SERPL-SCNC: 101 MMOL/L (ref 98–107)
CHOLEST SERPL-MCNC: 187 MG/DL (ref 0–200)
CO2 SERPL-SCNC: 25.7 MMOL/L (ref 22–29)
CREAT SERPL-MCNC: 1.04 MG/DL (ref 0.57–1)
DEPRECATED RDW RBC AUTO: 45.7 FL (ref 37–54)
EGFRCR SERPLBLD CKD-EPI 2021: 64 ML/MIN/1.73
EOSINOPHIL # BLD AUTO: 0.39 10*3/MM3 (ref 0–0.4)
EOSINOPHIL NFR BLD AUTO: 5.2 % (ref 0.3–6.2)
ERYTHROCYTE [DISTWIDTH] IN BLOOD BY AUTOMATED COUNT: 14.1 % (ref 12.3–15.4)
GLOBULIN UR ELPH-MCNC: 2.7 GM/DL
GLUCOSE SERPL-MCNC: 72 MG/DL (ref 65–99)
HBA1C MFR BLD: 4.8 % (ref 4.8–5.6)
HCT VFR BLD AUTO: 43.7 % (ref 34–46.6)
HDLC SERPL-MCNC: 67 MG/DL (ref 40–60)
HGB BLD-MCNC: 14.1 G/DL (ref 12–15.9)
IMM GRANULOCYTES # BLD AUTO: 0.03 10*3/MM3 (ref 0–0.05)
IMM GRANULOCYTES NFR BLD AUTO: 0.4 % (ref 0–0.5)
LDLC SERPL CALC-MCNC: 101 MG/DL (ref 0–100)
LDLC/HDLC SERPL: 1.47 {RATIO}
LYMPHOCYTES # BLD AUTO: 2.21 10*3/MM3 (ref 0.7–3.1)
LYMPHOCYTES NFR BLD AUTO: 29.3 % (ref 19.6–45.3)
MCH RBC QN AUTO: 28.7 PG (ref 26.6–33)
MCHC RBC AUTO-ENTMCNC: 32.3 G/DL (ref 31.5–35.7)
MCV RBC AUTO: 89 FL (ref 79–97)
MONOCYTES # BLD AUTO: 0.74 10*3/MM3 (ref 0.1–0.9)
MONOCYTES NFR BLD AUTO: 9.8 % (ref 5–12)
NEUTROPHILS NFR BLD AUTO: 4.11 10*3/MM3 (ref 1.7–7)
NEUTROPHILS NFR BLD AUTO: 54.5 % (ref 42.7–76)
NRBC BLD AUTO-RTO: 0 /100 WBC (ref 0–0.2)
PLATELET # BLD AUTO: 286 10*3/MM3 (ref 140–450)
PMV BLD AUTO: 10.6 FL (ref 6–12)
POTASSIUM SERPL-SCNC: 4.1 MMOL/L (ref 3.5–5.2)
PROT SERPL-MCNC: 6.9 G/DL (ref 6–8.5)
RBC # BLD AUTO: 4.91 10*6/MM3 (ref 3.77–5.28)
SODIUM SERPL-SCNC: 140 MMOL/L (ref 136–145)
T4 FREE SERPL-MCNC: 1.41 NG/DL (ref 0.93–1.7)
TRIGL SERPL-MCNC: 106 MG/DL (ref 0–150)
TSH SERPL DL<=0.05 MIU/L-ACNC: 1.45 UIU/ML (ref 0.27–4.2)
VLDLC SERPL-MCNC: 19 MG/DL (ref 5–40)
WBC NRBC COR # BLD AUTO: 7.54 10*3/MM3 (ref 3.4–10.8)

## 2024-03-19 PROCEDURE — 83036 HEMOGLOBIN GLYCOSYLATED A1C: CPT

## 2024-03-19 PROCEDURE — 84439 ASSAY OF FREE THYROXINE: CPT

## 2024-03-19 PROCEDURE — 80061 LIPID PANEL: CPT

## 2024-03-19 PROCEDURE — 36415 COLL VENOUS BLD VENIPUNCTURE: CPT

## 2024-03-19 PROCEDURE — 80050 GENERAL HEALTH PANEL: CPT

## 2024-03-20 ENCOUNTER — TELEPHONE (OUTPATIENT)
Dept: FAMILY MEDICINE CLINIC | Facility: CLINIC | Age: 54
End: 2024-03-20
Payer: COMMERCIAL

## 2024-03-20 NOTE — TELEPHONE ENCOUNTER
----- Message from CATE Boston sent at 3/20/2024  9:42 AM EDT -----  Please call the patient regarding her abnormal result. Her kidney function has improved slightly, continue to improve water intake. All other labs stable.

## 2024-03-25 ENCOUNTER — OFFICE VISIT (OUTPATIENT)
Dept: CARDIOLOGY | Facility: CLINIC | Age: 54
End: 2024-03-25
Payer: COMMERCIAL

## 2024-03-25 VITALS
RESPIRATION RATE: 16 BRPM | WEIGHT: 163 LBS | BODY MASS INDEX: 26.2 KG/M2 | HEART RATE: 50 BPM | SYSTOLIC BLOOD PRESSURE: 126 MMHG | DIASTOLIC BLOOD PRESSURE: 87 MMHG | HEIGHT: 66 IN | OXYGEN SATURATION: 100 %

## 2024-03-25 DIAGNOSIS — R00.1 SINUS BRADYCARDIA: Primary | ICD-10-CM

## 2024-03-25 PROCEDURE — 93000 ELECTROCARDIOGRAM COMPLETE: CPT | Performed by: INTERNAL MEDICINE

## 2024-03-25 PROCEDURE — 99204 OFFICE O/P NEW MOD 45 MIN: CPT | Performed by: INTERNAL MEDICINE

## 2024-03-25 RX ORDER — ANTIARTHRITIC COMBINATION NO.2 900 MG
1000 TABLET ORAL DAILY
COMMUNITY

## 2024-03-25 NOTE — PROGRESS NOTES
Whit Cortez PA  Karla Connell  1970 03/25/2024    Patient Active Problem List   Diagnosis    Abdominal pannus       Dear Whit Cortez PA:    Subjective     Karla Connell is a 53 y.o. female with the problems as listed above, presents    Chief complaint: Slow heart rate.    History of Present Illness: There is is a very pleasant 53-year-old  female with no history of known heart disease or coronary artery disease, presents for having a slow heart rate in the 40s.  On further questioning she says she has occasional mild dizziness but no presyncope or syncope.  She denies any unusual fatigue.  She has history of hypothyroidism and is on levothyroxine.   Her recent free T4 and TSH levels were normal on 3/19/2024. She is currently not on any medications to cause slow heart rate.     No Known Allergies:    Current Outpatient Medications:     Biotin 5000 MCG tablet, Take 1,000 mg by mouth Daily., Disp: , Rfl:     buPROPion SR (Wellbutrin SR) 200 MG 12 hr tablet, Take 1 tablet by mouth twice daily, Disp: 180 tablet, Rfl: 2    cholecalciferol (VITAMIN D3) 1.25 MG (06410 UT) capsule, Take 1 capsule by mouth Every 7 (Seven) Days., Disp: , Rfl:     cyclobenzaprine (FLEXERIL) 5 MG tablet, Take 1 tablet by mouth 3 times a day., Disp: 90 tablet, Rfl: 3    estrogen, conjugated,-medroxyprogesterone (Prempro) 0.3-1.5 MG per tablet, Take 1 tablet by mouth daily., Disp: 28 tablet, Rfl: 5    gabapentin (Neurontin) 600 MG tablet, Take 1 tablet by mouth three times daily, Disp: 90 tablet, Rfl: 2    hydrOXYzine (ATARAX) 50 MG tablet, Take 1 tablet by mouth twice daily, Disp: 180 tablet, Rfl: 2    levothyroxine (Synthroid) 88 MCG tablet, Take 1 tablet by mouth Daily., Disp: 90 tablet, Rfl: 0    montelukast (SINGULAIR) 10 MG tablet, TAKE 1 TABLET(S) BY MOUTH EACH EVENING, Disp: 90 tablet, Rfl: 2    Omega-3 Fatty Acids (fish oil) 1000 MG capsule capsule, Take 1 capsule by mouth 2 (Two) Times a Day., Disp: ,  Rfl:     oxybutynin (DITROPAN) 5 MG tablet, Take 1 tablet(s) by mouth 2 times daily, Disp: 180 tablet, Rfl: 2    Semaglutide-Weight Management (Wegovy) 2.4 MG/0.75ML solution auto-injector, Inject 2.4 mg under the skin into the appropriate area as directed 1 (One) Time Per Week., Disp: 3 mL, Rfl: 0    SUMAtriptan (Imitrex) 25 MG tablet, Take 1 tablet by mouth as needed for migraine, may repeat every 2 hours as needed., Disp: 27 tablet, Rfl: 2    Past Medical History:   Diagnosis Date    Anxiety     Arthritis     Back problem     Bladder disorder     Bradycardia     Bulging lumbar disc     Disease of thyroid gland     Headache     Hx of migraines     Neuromuscular disorder      Past Surgical History:   Procedure Laterality Date    ABDOMINAL SURGERY      APPENDECTOMY       SECTION      CHOLECYSTECTOMY      ENDOSCOPY      LAPAROSCOPIC CHOLECYSTECTOMY      PANNICULECTOMY N/A 2020    Procedure: PANNICULECTOMY;  Surgeon: Queenie Gray MD;  Location: [facility];  Service: General;  Laterality: N/A;    THYROID SURGERY      WISDOM TOOTH EXTRACTION       Family History   Problem Relation Age of Onset    Diabetes Mother     Cancer Mother         lung    Arthritis Mother     Thyroid disease Mother     Hypertension Father     Diabetes Father     Cancer Father         kidney    Arthritis Father     Kidney disease Father     Kidney failure Father     Diabetes Sister     Hypertension Sister     Cancer Brother         prostate    Diabetes Brother     Thyroid disease Sister     Breast cancer Neg Hx      Social History     Tobacco Use    Smoking status: Former     Current packs/day: 0.00     Average packs/day: 2.0 packs/day for 20.0 years (40.0 ttl pk-yrs)     Types: Cigarettes     Start date: 1995     Quit date: 2015     Years since quittin.6    Smokeless tobacco: Never   Vaping Use    Vaping status: Former   Substance Use Topics    Alcohol use: Never    Drug use: Never     Review of Systems  "  Constitutional: Positive for malaise/fatigue.   HENT:  Positive for congestion.    Eyes:  Positive for visual disturbance.   Cardiovascular:  Positive for palpitations.   Endocrine: Positive for cold intolerance and heat intolerance.   Skin:  Positive for dry skin.   Musculoskeletal:  Positive for back pain, joint pain, muscle cramps, myalgias and stiffness.   Genitourinary:  Positive for frequency.   Neurological:  Positive for headaches.   Psychiatric/Behavioral:  The patient has insomnia and is nervous/anxious.      Objective   Blood pressure 126/87, pulse 50, resp. rate 16, height 167.6 cm (66\"), weight 73.9 kg (163 lb), SpO2 100%, not currently breastfeeding.  Body mass index is 26.31 kg/m².    Vitals reviewed.   Constitutional:       Appearance: Well-developed.   Eyes:      Conjunctiva/sclera: Conjunctivae normal.   HENT:      Head: Normocephalic.   Neck:      Thyroid: No thyromegaly.      Vascular: No JVD.      Trachea: No tracheal deviation.   Pulmonary:      Effort: No respiratory distress.      Breath sounds: Normal breath sounds. No wheezing. No rales.   Cardiovascular:      PMI at left midclavicular line. Normal rate. Regular rhythm. Normal S1. Normal S2.       Murmurs: There is no murmur.      No gallop.  No click. No rub.   Pulses:     Intact distal pulses.   Edema:     Peripheral edema absent.   Abdominal:      General: Bowel sounds are normal.      Palpations: Abdomen is soft. There is no abdominal mass.      Tenderness: There is no abdominal tenderness.   Musculoskeletal:      Cervical back: Normal range of motion and neck supple. Skin:     General: Skin is warm and dry.   Neurological:      Mental Status: Alert and oriented to person, place, and time.      Cranial Nerves: No cranial nerve deficit.         Lab Results   Component Value Date     03/19/2024    K 4.1 03/19/2024     03/19/2024    CO2 25.7 03/19/2024    BUN 11 03/19/2024    CREATININE 1.04 (H) 03/19/2024    GLUCOSE 72 " "03/19/2024    CALCIUM 9.2 03/19/2024    AST 24 03/19/2024    ALT 15 03/19/2024    ALKPHOS 71 03/19/2024     No results found for: \"CKTOTAL\"  Lab Results   Component Value Date    WBC 7.54 03/19/2024    HGB 14.1 03/19/2024    HCT 43.7 03/19/2024     03/19/2024       Lab Results   Component Value Date    TSH 1.450 03/19/2024    TRIG 106 03/19/2024    HDL 67 (H) 03/19/2024     (H) 03/19/2024          ECG 12 Lead    Date/Time: 3/25/2024 9:19 AM  Performed by: Jesús Pozo MD    Authorized by: Jesús Pozo MD  Comparison: compared with previous ECG   Rhythm: sinus bradycardia  BPM: 45  Conduction: conduction normal  ST Segments: ST segments normal  T Waves: T waves normal          Assessment & Plan    Diagnosis Plan    Sinus bradycardia  (asymptomatic)            Recommendations  Orders Placed This Encounter   Procedures    ECG 12 Lead        I have discussed with her about the indications for pacemaker which would be if she is symptomatic with her slow heart rate.  Since she is not having a lot of symptoms at this time except for some occasional mild dizziness with no history of near syncope or syncope or significant fatigue, I told her that there is no absolute indication for permanent pacemaker implantation at this time but we will certainly consider this if she does develop significant symptoms in the future.  She expressed understanding and is agreeable with this plan.    Return in about 5 months (around 8/25/2024).    As always, Whit Lo,  I appreciate very much the opportunity to participate in the cardiovascular care of your patients. Please do not hesitate to call me with any questions with regards to Karla Connell's evaluation and management.     With Best Regards,        Jesús Pozo MD, [facility]    Please note that portions of this note were completed with a voice recognition program.          "

## 2024-03-25 NOTE — LETTER
March 25, 2024     Whit Cortez PA-C  [address] [place] [address]    Patient: Karla Connell   YOB: 1970   Date of Visit: 3/25/2024     Dear Whit Lo:       Thank you for referring Karla Connell to me for evaluation. Below are the relevant portions of my assessment and plan of care.    If you have questions, please do not hesitate to call me. I look forward to following Karla along with you.         Sincerely,        Jesús Pozo MD        CC: No Recipients    Jesús Pozo MD  03/25/24 2133  Sign when Signing Visit  Whit Cortez PA  Karla Connell  1970 03/25/2024    Patient Active Problem List   Diagnosis   • Abdominal pannus       Dear Whit Cortez PA:    Subjective    Karla Connell is a 53 y.o. female with the problems as listed above, presents    Chief complaint: Slow heart rate.    History of Present Illness: There is is a very pleasant 53-year-old  female with no history of known heart disease or coronary artery disease, presents for having a slow heart rate in the 40s.  On further questioning she says she has occasional mild dizziness but no presyncope or syncope.  She denies any unusual fatigue.  She has history of hypothyroidism and is on levothyroxine.   Her recent free T4 and TSH levels were normal on 3/19/2024. She is currently not on any medications to cause slow heart rate.     No Known Allergies:    Current Outpatient Medications:   •  Biotin 5000 MCG tablet, Take 1,000 mg by mouth Daily., Disp: , Rfl:   •  buPROPion SR (Wellbutrin SR) 200 MG 12 hr tablet, Take 1 tablet by mouth twice daily, Disp: 180 tablet, Rfl: 2  •  cholecalciferol (VITAMIN D3) 1.25 MG (62308 UT) capsule, Take 1 capsule by mouth Every 7 (Seven) Days., Disp: , Rfl:   •  cyclobenzaprine (FLEXERIL) 5 MG tablet, Take 1 tablet by mouth 3 times a day., Disp: 90 tablet, Rfl: 3  •  estrogen, conjugated,-medroxyprogesterone (Prempro) 0.3-1.5 MG per tablet,  Take 1 tablet by mouth daily., Disp: 28 tablet, Rfl: 5  •  gabapentin (Neurontin) 600 MG tablet, Take 1 tablet by mouth three times daily, Disp: 90 tablet, Rfl: 2  •  hydrOXYzine (ATARAX) 50 MG tablet, Take 1 tablet by mouth twice daily, Disp: 180 tablet, Rfl: 2  •  levothyroxine (Synthroid) 88 MCG tablet, Take 1 tablet by mouth Daily., Disp: 90 tablet, Rfl: 0  •  montelukast (SINGULAIR) 10 MG tablet, TAKE 1 TABLET(S) BY MOUTH EACH EVENING, Disp: 90 tablet, Rfl: 2  •  Omega-3 Fatty Acids (fish oil) 1000 MG capsule capsule, Take 1 capsule by mouth 2 (Two) Times a Day., Disp: , Rfl:   •  oxybutynin (DITROPAN) 5 MG tablet, Take 1 tablet(s) by mouth 2 times daily, Disp: 180 tablet, Rfl: 2  •  Semaglutide-Weight Management (Wegovy) 2.4 MG/0.75ML solution auto-injector, Inject 2.4 mg under the skin into the appropriate area as directed 1 (One) Time Per Week., Disp: 3 mL, Rfl: 0  •  SUMAtriptan (Imitrex) 25 MG tablet, Take 1 tablet by mouth as needed for migraine, may repeat every 2 hours as needed., Disp: 27 tablet, Rfl: 2    Past Medical History:   Diagnosis Date   • Anxiety    • Arthritis    • Back problem    • Bladder disorder    • Bradycardia    • Bulging lumbar disc    • Disease of thyroid gland    • Headache    • Hx of migraines    • Neuromuscular disorder      Past Surgical History:   Procedure Laterality Date   • ABDOMINAL SURGERY     • APPENDECTOMY     •  SECTION     • CHOLECYSTECTOMY     • ENDOSCOPY     • LAPAROSCOPIC CHOLECYSTECTOMY     • PANNICULECTOMY N/A 2020    Procedure: PANNICULECTOMY;  Surgeon: Queenie Gray MD;  Location: [facility];  Service: General;  Laterality: N/A;   • THYROID SURGERY     • WISDOM TOOTH EXTRACTION       Family History   Problem Relation Age of Onset   • Diabetes Mother    • Cancer Mother         lung   • Arthritis Mother    • Thyroid disease Mother    • Hypertension Father    • Diabetes Father    • Cancer Father         kidney   • Arthritis Father    • Kidney  "disease Father    • Kidney failure Father    • Diabetes Sister    • Hypertension Sister    • Cancer Brother         prostate   • Diabetes Brother    • Thyroid disease Sister    • Breast cancer Neg Hx      Social History     Tobacco Use   • Smoking status: Former     Current packs/day: 0.00     Average packs/day: 2.0 packs/day for 20.0 years (40.0 ttl pk-yrs)     Types: Cigarettes     Start date: 1995     Quit date: 2015     Years since quittin.6   • Smokeless tobacco: Never   Vaping Use   • Vaping status: Former   Substance Use Topics   • Alcohol use: Never   • Drug use: Never     Review of Systems   Constitutional: Positive for malaise/fatigue.   HENT:  Positive for congestion.    Eyes:  Positive for visual disturbance.   Cardiovascular:  Positive for palpitations.   Endocrine: Positive for cold intolerance and heat intolerance.   Skin:  Positive for dry skin.   Musculoskeletal:  Positive for back pain, joint pain, muscle cramps, myalgias and stiffness.   Genitourinary:  Positive for frequency.   Neurological:  Positive for headaches.   Psychiatric/Behavioral:  The patient has insomnia and is nervous/anxious.      Objective  Blood pressure 126/87, pulse 50, resp. rate 16, height 167.6 cm (66\"), weight 73.9 kg (163 lb), SpO2 100%, not currently breastfeeding.  Body mass index is 26.31 kg/m².    Vitals reviewed.   Constitutional:       Appearance: Well-developed.   Eyes:      Conjunctiva/sclera: Conjunctivae normal.   HENT:      Head: Normocephalic.   Neck:      Thyroid: No thyromegaly.      Vascular: No JVD.      Trachea: No tracheal deviation.   Pulmonary:      Effort: No respiratory distress.      Breath sounds: Normal breath sounds. No wheezing. No rales.   Cardiovascular:      PMI at left midclavicular line. Normal rate. Regular rhythm. Normal S1. Normal S2.       Murmurs: There is no murmur.      No gallop.  No click. No rub.   Pulses:     Intact distal pulses.   Edema:     Peripheral edema " "absent.   Abdominal:      General: Bowel sounds are normal.      Palpations: Abdomen is soft. There is no abdominal mass.      Tenderness: There is no abdominal tenderness.   Musculoskeletal:      Cervical back: Normal range of motion and neck supple. Skin:     General: Skin is warm and dry.   Neurological:      Mental Status: Alert and oriented to person, place, and time.      Cranial Nerves: No cranial nerve deficit.         Lab Results   Component Value Date     03/19/2024    K 4.1 03/19/2024     03/19/2024    CO2 25.7 03/19/2024    BUN 11 03/19/2024    CREATININE 1.04 (H) 03/19/2024    GLUCOSE 72 03/19/2024    CALCIUM 9.2 03/19/2024    AST 24 03/19/2024    ALT 15 03/19/2024    ALKPHOS 71 03/19/2024     No results found for: \"CKTOTAL\"  Lab Results   Component Value Date    WBC 7.54 03/19/2024    HGB 14.1 03/19/2024    HCT 43.7 03/19/2024     03/19/2024       Lab Results   Component Value Date    TSH 1.450 03/19/2024    TRIG 106 03/19/2024    HDL 67 (H) 03/19/2024     (H) 03/19/2024          ECG 12 Lead    Date/Time: 3/25/2024 9:19 AM  Performed by: Jesús Pozo MD    Authorized by: Jesús Pozo MD  Comparison: compared with previous ECG   Rhythm: sinus bradycardia  BPM: 45  Conduction: conduction normal  ST Segments: ST segments normal  T Waves: T waves normal          Assessment & Plan   Diagnosis Plan    Sinus bradycardia  (asymptomatic)            Recommendations  Orders Placed This Encounter   Procedures   • ECG 12 Lead        I have discussed with her about the indications for pacemaker which would be if she is symptomatic with her slow heart rate.  Since she is not having a lot of symptoms at this time except for some occasional mild dizziness with no history of near syncope or syncope or significant fatigue, I told her that there is no absolute indication for permanent pacemaker implantation at this time but we will certainly consider this if she does develop significant " symptoms in the future.  She expressed understanding and is agreeable with this plan.    Return in about 5 months (around 8/25/2024).    As always, Whit Lo,  I appreciate very much the opportunity to participate in the cardiovascular care of your patients. Please do not hesitate to call me with any questions with regards to Karla Connell's evaluation and management.     With Best Regards,        Jesús Pozo MD, FACC    Please note that portions of this note were completed with a voice recognition program.

## 2024-03-27 ENCOUNTER — PATIENT ROUNDING (BHMG ONLY) (OUTPATIENT)
Dept: CARDIOLOGY | Facility: CLINIC | Age: 54
End: 2024-03-27
Payer: COMMERCIAL

## 2024-04-09 ENCOUNTER — DISEASE STATE MANAGEMENT VISIT (OUTPATIENT)
Dept: PHARMACY | Facility: HOSPITAL | Age: 54
End: 2024-04-09
Payer: COMMERCIAL

## 2024-04-09 VITALS
SYSTOLIC BLOOD PRESSURE: 125 MMHG | BODY MASS INDEX: 26.07 KG/M2 | HEART RATE: 56 BPM | HEIGHT: 66 IN | DIASTOLIC BLOOD PRESSURE: 78 MMHG | WEIGHT: 162.2 LBS

## 2024-04-09 RX ORDER — SEMAGLUTIDE 2.4 MG/.75ML
2.4 INJECTION, SOLUTION SUBCUTANEOUS WEEKLY
Qty: 3 ML | Refills: 0 | Status: SHIPPED | OUTPATIENT
Start: 2024-04-09

## 2024-04-09 NOTE — PROGRESS NOTES
[facility]  Weight Management Program      Karla Connell is a 53 y.o. female referred to the [facility] by Tiffani Quezada (3/9/23) for clinical pharmacy and specialty pharmacy management of Wegovy for weight management. Karla Connell has previously tried walking, eating healthy, and lifestyle changes for weight loss.  Current weight loss efforts include walking 2-4 times a week 30-45 minutes and Wegovy 2.4mg. Patient had her thyroid removed in . Patient reports that she has lost ~100 lbs over the years by walking.    Patient is currently on Wegovy 2.4 mg weekly. Patient denies any lumps/swelling/hoarseness in neck/throat or abdominal pain. Patient reports tolerating medication well with only a little nausea that goes away quickly. She also reports a little constipation but that she Miralax daily and it helps. Patient denies any trouble giving injection or any missed doses. Patient reports that she saw Dr. Pozo recently regarding low heart rate and cardiology is holding off on a pacemaker for now. She states that she will eventually need one. Patient reports that she is doing well with her water intake goal and that she is still walking ~1 hour 4-5x/week on the treadmill.    Patient reports a goal weight of 150 lbs.    Relevant Past Medical History and Co-morbidities  Past Medical History:   Diagnosis Date    Anxiety     Arthritis     Back problem     Bladder disorder     Bradycardia     Bulging lumbar disc     Disease of thyroid gland     Headache     Hx of migraines     Neuromuscular disorder      Social History     Socioeconomic History    Marital status: Single   Tobacco Use    Smoking status: Former     Current packs/day: 0.00     Average packs/day: 2.0 packs/day for 20.0 years (40.0 ttl pk-yrs)     Types: Cigarettes     Start date: 1995     Quit date: 2015     Years since quittin.6    Smokeless tobacco: Never   Vaping Use    Vaping status: Former    Substance and Sexual Activity    Alcohol use: Never    Drug use: Never    Sexual activity: Yes     Partners: Male     Birth control/protection: Other, None       Allergies  Patient has no known allergies.    Current Medication List    Current Outpatient Medications:     Biotin 5000 MCG tablet, Take 1,000 mg by mouth Daily., Disp: , Rfl:     buPROPion SR (Wellbutrin SR) 200 MG 12 hr tablet, Take 1 tablet by mouth twice daily, Disp: 180 tablet, Rfl: 2    cholecalciferol (VITAMIN D3) 1.25 MG (37083 UT) capsule, Take 1 capsule by mouth Every 7 (Seven) Days., Disp: , Rfl:     cyclobenzaprine (FLEXERIL) 5 MG tablet, Take 1 tablet by mouth 3 times a day., Disp: 90 tablet, Rfl: 3    estrogen, conjugated,-medroxyprogesterone (Prempro) 0.3-1.5 MG per tablet, Take 1 tablet by mouth daily., Disp: 28 tablet, Rfl: 5    gabapentin (Neurontin) 600 MG tablet, Take 1 tablet by mouth three times daily, Disp: 90 tablet, Rfl: 2    hydrOXYzine (ATARAX) 50 MG tablet, Take 1 tablet by mouth twice daily, Disp: 180 tablet, Rfl: 2    levothyroxine (Synthroid) 88 MCG tablet, Take 1 tablet by mouth Daily., Disp: 90 tablet, Rfl: 0    montelukast (SINGULAIR) 10 MG tablet, TAKE 1 TABLET(S) BY MOUTH EACH EVENING, Disp: 90 tablet, Rfl: 2    oxybutynin (DITROPAN) 5 MG tablet, Take 1 tablet(s) by mouth 2 times daily, Disp: 180 tablet, Rfl: 2    Semaglutide-Weight Management (Wegovy) 2.4 MG/0.75ML solution auto-injector, Inject 2.4 mg under the skin into the appropriate area as directed 1 (One) Time Per Week., Disp: 3 mL, Rfl: 0    SUMAtriptan (Imitrex) 25 MG tablet, Take 1 tablet by mouth as needed for migraine, may repeat every 2 hours as needed., Disp: 27 tablet, Rfl: 2    Drug Interactions  Wegovy + Prempro -  may diminish the therapeutic effect of Antidiabetic Agents.  Wegovy + Levothyroxine- Wegovy can increase serum concentration of Levothyroxine    Relevant Laboratory Values  Lab Results   Component Value Date    CHOL 187 03/19/2024    TRIG  "106 03/19/2024    HDL 67 (H) 03/19/2024     (H) 03/19/2024     Body mass index is 26.18 kg/m².    Vaccinations:   Patient recommended to keep up with routine vaccinations.     Goals of Therapy  Clinical Goals or Therapeutic Targets: 10% weight loss goal      Date 11/11/22 12/9/22 1/5/23 1/19/23 2/9/23 3/9/23 4/6/23 5/4/23 6/1/23   Weight (lb) 206 lbs 204.4 lbs 197.8 lb 196.2 lb 194.8 lb 191.2lb 190.2lb 184.4 lb 181.8 lb   BMI kg/ 32.75 32.5 31.45 31.19 30.97 30.39 30.27 29.32 28.9   Waist Circumference (in)  45 in 47 in 46\"   46.5\"  *changing to Wegovy when PA goes through 45\" 42\" 42\" 41\" 41\"     Date 6/30/23 7/28/23 8/31/23 9/28/23 10/26/23 11/20/23 12/18/23 1/15/24 2/13/24 3/12/24   Weight (lb) 177.2lb 173.6 lb 168.8 lb 168.2 lb 165.4 lb 164 lb 162.6 lb 160.4 lb 164.4 lb 163.6 lb   BMI kg/ 28.17 27.60 26.84 26.74 26.30 26.07 25.85 25.50 26.14 26.01   Waist Circumference (in)  40 in 42.8\" 41\" 41.5\" 41\" 42\" 39\" 41.5\" 42\" 39.75\"     Date 4/9/24   Weight (lb) 162.2 lb   BMI kg/ 26.18   Waist Circumference (in)  41.5\"       Medication Assessment & Plan    Patient's current BMI is 26.18, which is considered overweight. She has lost 43.8 lbs overall. Congratulated patient on her success thus far!    Will re-order Wegovy 2.4 mg SC weekly.     Discussed lifestyle modifications, including diet and exercise.     Reviewed labs from 3/19/24. Tiffani has also reviewed labs with patient.    Worked with patient to create SMART Goal(s):  pt will continue to work towards these goals.   1. Increase water intake to 80 oz of water a day   2. Continue to walk 45 minutes 4 times a week    Will follow-up with patient in 4 weeks, or sooner if needed.     Yumiko Marcum, PharmD  4/9/2024  14:23 EDT  "

## 2024-05-07 ENCOUNTER — DISEASE STATE MANAGEMENT VISIT (OUTPATIENT)
Dept: PHARMACY | Facility: HOSPITAL | Age: 54
End: 2024-05-07
Payer: COMMERCIAL

## 2024-05-07 VITALS
WEIGHT: 162.8 LBS | HEIGHT: 66 IN | HEART RATE: 61 BPM | SYSTOLIC BLOOD PRESSURE: 151 MMHG | DIASTOLIC BLOOD PRESSURE: 89 MMHG | BODY MASS INDEX: 26.16 KG/M2

## 2024-05-07 RX ORDER — SEMAGLUTIDE 2.4 MG/.75ML
2.4 INJECTION, SOLUTION SUBCUTANEOUS WEEKLY
Qty: 3 ML | Refills: 0 | Status: SHIPPED | OUTPATIENT
Start: 2024-05-07

## 2024-05-28 ENCOUNTER — OFFICE VISIT (OUTPATIENT)
Dept: FAMILY MEDICINE CLINIC | Facility: CLINIC | Age: 54
End: 2024-05-28
Payer: COMMERCIAL

## 2024-05-28 VITALS
DIASTOLIC BLOOD PRESSURE: 80 MMHG | HEIGHT: 66 IN | BODY MASS INDEX: 26.07 KG/M2 | HEART RATE: 115 BPM | TEMPERATURE: 97.7 F | OXYGEN SATURATION: 96 % | WEIGHT: 162.2 LBS | SYSTOLIC BLOOD PRESSURE: 122 MMHG

## 2024-05-28 DIAGNOSIS — F17.211 CIGARETTE NICOTINE DEPENDENCE IN REMISSION: ICD-10-CM

## 2024-05-28 DIAGNOSIS — E66.3 OVERWEIGHT (BMI 25.0-29.9): Primary | ICD-10-CM

## 2024-05-28 PROCEDURE — 99214 OFFICE O/P EST MOD 30 MIN: CPT | Performed by: FAMILY MEDICINE

## 2024-05-28 RX ORDER — LEVOTHYROXINE SODIUM 88 UG/1
88 TABLET ORAL DAILY
Qty: 90 TABLET | Refills: 0 | Status: SHIPPED | OUTPATIENT
Start: 2024-05-28

## 2024-05-28 NOTE — PROGRESS NOTES
"Chief Complaint  Weight Check    Subjective          Karla Connell presents to [facility]  History of Present Illness    Patient here to follow up on weight loss. States she is doing well with medication a this time. Denies any adverse side effects. Will order repeat labs.      Review of Systems      Objective   Vital Signs:   /80 (BP Location: Right arm, Patient Position: Sitting, Cuff Size: Adult)   Pulse 115   Temp 97.7 °F (36.5 °C) (Temporal)   Ht 167.6 cm (66\")   Wt 73.6 kg (162 lb 3.2 oz)   SpO2 96%   BMI 26.18 kg/m²     Physical Exam  Constitutional:       General: She is not in acute distress.     Appearance: Normal appearance. She is well-developed and well-groomed. She is not ill-appearing, toxic-appearing or diaphoretic.   HENT:      Head: Normocephalic.      Nose: Nose normal. No congestion or rhinorrhea.      Mouth/Throat:      Mouth: Mucous membranes are moist.      Pharynx: Oropharynx is clear. No oropharyngeal exudate or posterior oropharyngeal erythema.   Eyes:      General: Lids are normal.         Right eye: No discharge.         Left eye: No discharge.      Extraocular Movements: Extraocular movements intact.      Pupils: Pupils are equal, round, and reactive to light.   Neck:      Vascular: No carotid bruit.   Cardiovascular:      Rate and Rhythm: Normal rate and regular rhythm.      Pulses: Normal pulses.      Heart sounds: Normal heart sounds. No murmur heard.     No friction rub. No gallop.   Pulmonary:      Effort: Pulmonary effort is normal. No respiratory distress.      Breath sounds: Normal breath sounds. No stridor. No wheezing, rhonchi or rales.   Chest:      Chest wall: No tenderness.   Abdominal:      General: Bowel sounds are normal. There is no distension.      Palpations: Abdomen is soft. There is no mass.      Tenderness: There is no abdominal tenderness. There is no right CVA tenderness, left CVA tenderness, guarding or rebound.      " Hernia: No hernia is present.   Musculoskeletal:         General: No swelling or tenderness. Normal range of motion.      Cervical back: Normal range of motion and neck supple. No rigidity or tenderness.      Right lower leg: No edema.      Left lower leg: No edema.   Lymphadenopathy:      Cervical: No cervical adenopathy.   Skin:     General: Skin is warm.      Capillary Refill: Capillary refill takes less than 2 seconds.      Coloration: Skin is not jaundiced.      Findings: No bruising, erythema or rash.   Neurological:      General: No focal deficit present.      Mental Status: She is alert and oriented to person, place, and time.      Motor: Motor function is intact. No weakness.      Coordination: Coordination is intact.      Gait: Gait is intact. Gait normal.   Psychiatric:         Attention and Perception: Attention normal.         Mood and Affect: Mood normal.         Speech: Speech normal.         Behavior: Behavior normal.         Cognition and Memory: Cognition normal.         Judgment: Judgment normal.        Result Review :                 Assessment and Plan    Diagnoses and all orders for this visit:    1. Overweight (BMI 25.0-29.9) (Primary)  -     CBC Auto Differential; Future  -     Comprehensive Metabolic Panel; Future  -     Hemoglobin A1c; Future  -     Lipid Panel; Future  -     TSH; Future  -     T4, Free; Future    2. Cigarette nicotine dependence in remission  -     CT Chest Low Dose Wo; Future    Other orders  -     levothyroxine (Synthroid) 88 MCG tablet; Take 1 tablet by mouth Daily.  Dispense: 90 tablet; Refill: 0      Patient's Body mass index is 26.18 kg/m². indicating that she is overweight (BMI 25-29.9). Patient's (Body mass index is 26.18 kg/m².) indicates that they are overweight with health conditions that include none . Weight is unchanged. BMI is is above average; BMI management plan is completed. We discussed low calorie, low carb based diet program, portion control, and  increasing exercise. .    Follow Up   Return in about 3 months (around 8/28/2024), or if symptoms worsen or fail to improve.  Patient was given instructions and counseling regarding her condition or for health maintenance advice. Please see specific information pulled into the AVS if appropriate.     This document has been electronically signed by CATE Boston  May 28, 2024 09:09 EDT

## 2024-06-04 ENCOUNTER — DISEASE STATE MANAGEMENT VISIT (OUTPATIENT)
Dept: PHARMACY | Facility: HOSPITAL | Age: 54
End: 2024-06-04
Payer: COMMERCIAL

## 2024-06-04 VITALS
WEIGHT: 162 LBS | DIASTOLIC BLOOD PRESSURE: 89 MMHG | HEART RATE: 55 BPM | BODY MASS INDEX: 26.03 KG/M2 | HEIGHT: 66 IN | SYSTOLIC BLOOD PRESSURE: 146 MMHG

## 2024-06-04 RX ORDER — SEMAGLUTIDE 2.4 MG/.75ML
2.4 INJECTION, SOLUTION SUBCUTANEOUS WEEKLY
Qty: 3 ML | Refills: 0 | Status: SHIPPED | OUTPATIENT
Start: 2024-06-04

## 2024-06-04 NOTE — PROGRESS NOTES
[facility]  Weight Management Program      Karla Connell is a 53 y.o. female referred to the [facility] by Tiffani Quezada (3/9/23) for clinical pharmacy and specialty pharmacy management of Wegovy for weight management. Karla Connell has previously tried walking, eating healthy, and lifestyle changes for weight loss.  Current weight loss efforts include walking 2-4 times a week 30-45 minutes and Wegovy 2.4mg. Patient had her thyroid removed in 2007. Patient reports that she has lost ~100 lbs over the years by walking.    Patient is currently on Wegovy 2.4 mg weekly. Patient denies any lumps/swelling/hoarseness in neck/throat or abdominal pain.  Pateint denies any missed doses or trouble giving the injection. Patient does report that she has started increasing frequency to about 2 weeks and has been tolerating the injection well.  Patient reports tolerating medication well with only a little nausea that goes away quickly. She also reports a little constipation but that she Miralax daily and it helps.  Patient reports that she saw Dr. Pozo recently regarding low heart rate and cardiology is holding off on a pacemaker for now. She states that she will eventually need one.   Patient reports that she is doing well with her water intake goal and that she is still walking ~1 hour 4-5x/week on the treadmill. Patient's water intake varies, but she does drink throughout the day. She estimates 60-80 ounces daily.    Patient reports a goal weight of 150 lbs.    Relevant Past Medical History and Co-morbidities  Past Medical History:   Diagnosis Date    Anxiety     Arthritis     Back problem     Bladder disorder     Bradycardia     Bulging lumbar disc     Disease of thyroid gland     Headache     Hx of migraines     Neuromuscular disorder      Social History     Socioeconomic History    Marital status: Single   Tobacco Use    Smoking status: Former     Current packs/day: 0.00     Average  packs/day: 2.0 packs/day for 20.0 years (40.0 ttl pk-yrs)     Types: Cigarettes     Start date: 1995     Quit date: 2015     Years since quittin.7    Smokeless tobacco: Never   Vaping Use    Vaping status: Former   Substance and Sexual Activity    Alcohol use: Never    Drug use: Never    Sexual activity: Yes     Partners: Male     Birth control/protection: Other, None       Allergies  Patient has no known allergies.    Current Medication List    Current Outpatient Medications:     buPROPion SR (Wellbutrin SR) 200 MG 12 hr tablet, Take 1 tablet by mouth twice daily, Disp: 180 tablet, Rfl: 2    cyclobenzaprine (FLEXERIL) 5 MG tablet, Take 1 tablet by mouth 3 times a day., Disp: 90 tablet, Rfl: 3    cyclobenzaprine (FLEXERIL) 5 MG tablet, Take 1 tablet by mouth 3 times a day., Disp: 90 tablet, Rfl: 3    estrogen, conjugated,-medroxyprogesterone (Prempro) 0.3-1.5 MG per tablet, Take 1 tablet by mouth daily., Disp: 28 tablet, Rfl: 5    gabapentin (Neurontin) 600 MG tablet, Take 1 tablet by mouth 3 times a day., Disp: 90 tablet, Rfl: 2    hydrOXYzine (ATARAX) 50 MG tablet, Take 1 tablet by mouth twice daily, Disp: 180 tablet, Rfl: 2    levothyroxine (Synthroid) 88 MCG tablet, Take 1 tablet by mouth Daily., Disp: 90 tablet, Rfl: 0    montelukast (SINGULAIR) 10 MG tablet, TAKE 1 TABLET(S) BY MOUTH EACH EVENING, Disp: 90 tablet, Rfl: 2    oxybutynin (DITROPAN) 5 MG tablet, Take 1 tablet(s) by mouth 2 times daily, Disp: 180 tablet, Rfl: 2    Semaglutide-Weight Management (Wegovy) 2.4 MG/0.75ML solution auto-injector, Inject 2.4 mg under the skin into the appropriate area as directed 1 (One) Time Per Week., Disp: 3 mL, Rfl: 0    SUMAtriptan (Imitrex) 25 MG tablet, Take 1 tablet by mouth as needed for migraine, may repeat every 2 hours as needed., Disp: 27 tablet, Rfl: 2    Drug Interactions  Wegovy + Prempro -  may diminish the therapeutic effect of Antidiabetic Agents.  Wegovy + Levothyroxine- Wegovy can  "increase serum concentration of Levothyroxine    Relevant Laboratory Values  Lab Results   Component Value Date    CHOL 187 03/19/2024    TRIG 106 03/19/2024    HDL 67 (H) 03/19/2024     (H) 03/19/2024     There is no height or weight on file to calculate BMI.    Vaccinations:   Patient recommended to keep up with routine vaccinations.     Goals of Therapy  Clinical Goals or Therapeutic Targets: 10% weight loss goal      Date 11/11/22 12/9/22 1/5/23 1/19/23 2/9/23 3/9/23 4/6/23 5/4/23 6/1/23   Weight (lb) 206 lbs 204.4 lbs 197.8 lb 196.2 lb 194.8 lb 191.2lb 190.2lb 184.4 lb 181.8 lb   BMI kg/ 32.75 32.5 31.45 31.19 30.97 30.39 30.27 29.32 28.9   Waist Circumference (in)  45 in 47 in 46\"   46.5\"  *changing to Wegovy when PA goes through 45\" 42\" 42\" 41\" 41\"     Date 6/30/23 7/28/23 8/31/23 9/28/23 10/26/23 11/20/23 12/18/23 1/15/24 2/13/24 3/12/24   Weight (lb) 177.2lb 173.6 lb 168.8 lb 168.2 lb 165.4 lb 164 lb 162.6 lb 160.4 lb 164.4 lb 163.6 lb   BMI kg/ 28.17 27.60 26.84 26.74 26.30 26.07 25.85 25.50 26.14 26.01   Waist Circumference (in)  40 in 42.8\" 41\" 41.5\" 41\" 42\" 39\" 41.5\" 42\" 39.75\"     Date 4/9/24 5/7/24 6/4/24   Weight (lb) 162.2 lb 162.8 lb 162 lb   BMI kg/ 26.18 26.28 26.15   Waist Circumference (in)  41.5\" 41\" 40\"       Medication Assessment & Plan    Patient's current BMI is 26.15, which is considered overweight. She has lost 44.6 lbs overall. Congratulated patient on her success thus far!    Will re-order Wegovy 2.4 mg SC weekly.     Discussed lifestyle modifications, including diet and exercise.         Worked with patient to create SMART Goal(s):  pt will continue to work towards these goals.   1. Increase water intake to 80 oz of water a day   2. Continue to walk 45 minutes 4 times a week    Will follow-up with patient in 4 weeks, or sooner if needed.     Treasure Arguello. Zeferino, PharmD  6/4/2024  14:35 EDT  "

## 2024-06-27 ENCOUNTER — TELEPHONE (OUTPATIENT)
Dept: FAMILY MEDICINE CLINIC | Facility: CLINIC | Age: 54
End: 2024-06-27
Payer: COMMERCIAL

## 2024-06-27 ENCOUNTER — LAB (OUTPATIENT)
Dept: LAB | Facility: HOSPITAL | Age: 54
End: 2024-06-27
Payer: COMMERCIAL

## 2024-06-27 DIAGNOSIS — E66.3 OVERWEIGHT (BMI 25.0-29.9): ICD-10-CM

## 2024-06-27 LAB
ALBUMIN SERPL-MCNC: 4 G/DL (ref 3.5–5.2)
ALBUMIN/GLOB SERPL: 1.5 G/DL
ALP SERPL-CCNC: 61 U/L (ref 39–117)
ALT SERPL W P-5'-P-CCNC: 23 U/L (ref 1–33)
ANION GAP SERPL CALCULATED.3IONS-SCNC: 7.2 MMOL/L (ref 5–15)
AST SERPL-CCNC: 25 U/L (ref 1–32)
BASOPHILS # BLD AUTO: 0.07 10*3/MM3 (ref 0–0.2)
BASOPHILS NFR BLD AUTO: 0.8 % (ref 0–1.5)
BILIRUB SERPL-MCNC: 0.4 MG/DL (ref 0–1.2)
BUN SERPL-MCNC: 10 MG/DL (ref 6–20)
BUN/CREAT SERPL: 9.7 (ref 7–25)
CALCIUM SPEC-SCNC: 9.3 MG/DL (ref 8.6–10.5)
CHLORIDE SERPL-SCNC: 103 MMOL/L (ref 98–107)
CHOLEST SERPL-MCNC: 174 MG/DL (ref 0–200)
CO2 SERPL-SCNC: 28.8 MMOL/L (ref 22–29)
CREAT SERPL-MCNC: 1.03 MG/DL (ref 0.57–1)
DEPRECATED RDW RBC AUTO: 50.1 FL (ref 37–54)
EGFRCR SERPLBLD CKD-EPI 2021: 64.7 ML/MIN/1.73
EOSINOPHIL # BLD AUTO: 0.22 10*3/MM3 (ref 0–0.4)
EOSINOPHIL NFR BLD AUTO: 2.7 % (ref 0.3–6.2)
ERYTHROCYTE [DISTWIDTH] IN BLOOD BY AUTOMATED COUNT: 15.3 % (ref 12.3–15.4)
GLOBULIN UR ELPH-MCNC: 2.6 GM/DL
GLUCOSE SERPL-MCNC: 89 MG/DL (ref 65–99)
HBA1C MFR BLD: 4.9 % (ref 4.8–5.6)
HCT VFR BLD AUTO: 42.4 % (ref 34–46.6)
HDLC SERPL-MCNC: 74 MG/DL (ref 40–60)
HGB BLD-MCNC: 13.7 G/DL (ref 12–15.9)
IMM GRANULOCYTES # BLD AUTO: 0.02 10*3/MM3 (ref 0–0.05)
IMM GRANULOCYTES NFR BLD AUTO: 0.2 % (ref 0–0.5)
LDLC SERPL CALC-MCNC: 82 MG/DL (ref 0–100)
LDLC/HDLC SERPL: 1.07 {RATIO}
LYMPHOCYTES # BLD AUTO: 2.16 10*3/MM3 (ref 0.7–3.1)
LYMPHOCYTES NFR BLD AUTO: 26.1 % (ref 19.6–45.3)
MCH RBC QN AUTO: 28.8 PG (ref 26.6–33)
MCHC RBC AUTO-ENTMCNC: 32.3 G/DL (ref 31.5–35.7)
MCV RBC AUTO: 89.1 FL (ref 79–97)
MONOCYTES # BLD AUTO: 0.77 10*3/MM3 (ref 0.1–0.9)
MONOCYTES NFR BLD AUTO: 9.3 % (ref 5–12)
NEUTROPHILS NFR BLD AUTO: 5.03 10*3/MM3 (ref 1.7–7)
NEUTROPHILS NFR BLD AUTO: 60.9 % (ref 42.7–76)
NRBC BLD AUTO-RTO: 0 /100 WBC (ref 0–0.2)
PLATELET # BLD AUTO: 257 10*3/MM3 (ref 140–450)
PMV BLD AUTO: 9.5 FL (ref 6–12)
POTASSIUM SERPL-SCNC: 4.2 MMOL/L (ref 3.5–5.2)
PROT SERPL-MCNC: 6.6 G/DL (ref 6–8.5)
RBC # BLD AUTO: 4.76 10*6/MM3 (ref 3.77–5.28)
SODIUM SERPL-SCNC: 139 MMOL/L (ref 136–145)
T4 FREE SERPL-MCNC: 1.24 NG/DL (ref 0.93–1.7)
TRIGL SERPL-MCNC: 104 MG/DL (ref 0–150)
TSH SERPL DL<=0.05 MIU/L-ACNC: 1.3 UIU/ML (ref 0.27–4.2)
VLDLC SERPL-MCNC: 18 MG/DL (ref 5–40)
WBC NRBC COR # BLD AUTO: 8.27 10*3/MM3 (ref 3.4–10.8)

## 2024-06-27 PROCEDURE — 80061 LIPID PANEL: CPT

## 2024-06-27 PROCEDURE — 80050 GENERAL HEALTH PANEL: CPT

## 2024-06-27 PROCEDURE — 36415 COLL VENOUS BLD VENIPUNCTURE: CPT

## 2024-06-27 PROCEDURE — 83036 HEMOGLOBIN GLYCOSYLATED A1C: CPT

## 2024-06-27 PROCEDURE — 84439 ASSAY OF FREE THYROXINE: CPT

## 2024-06-27 NOTE — TELEPHONE ENCOUNTER
----- Message from Tiffani Quezada sent at 6/27/2024  9:59 AM EDT -----  Please let patient know results are stable, continue to increase water intake. Creatinine was about the same. Thank you.

## 2024-07-11 ENCOUNTER — DISEASE STATE MANAGEMENT VISIT (OUTPATIENT)
Dept: PHARMACY | Facility: HOSPITAL | Age: 54
End: 2024-07-11
Payer: COMMERCIAL

## 2024-07-11 VITALS
WEIGHT: 163.4 LBS | SYSTOLIC BLOOD PRESSURE: 131 MMHG | DIASTOLIC BLOOD PRESSURE: 83 MMHG | BODY MASS INDEX: 26.26 KG/M2 | HEART RATE: 54 BPM | HEIGHT: 66 IN

## 2024-07-11 RX ORDER — SEMAGLUTIDE 2.4 MG/.75ML
2.4 INJECTION, SOLUTION SUBCUTANEOUS WEEKLY
Qty: 9 ML | Refills: 0 | Status: SHIPPED | OUTPATIENT
Start: 2024-07-11

## 2024-07-24 ENCOUNTER — TRANSCRIBE ORDERS (OUTPATIENT)
Dept: ADMINISTRATIVE | Facility: HOSPITAL | Age: 54
End: 2024-07-24
Payer: COMMERCIAL

## 2024-07-24 DIAGNOSIS — F17.210 CIGARETTE SMOKER: Primary | ICD-10-CM

## 2024-08-13 ENCOUNTER — DISEASE STATE MANAGEMENT VISIT (OUTPATIENT)
Dept: PHARMACY | Facility: HOSPITAL | Age: 54
End: 2024-08-13
Payer: COMMERCIAL

## 2024-08-13 VITALS
HEART RATE: 69 BPM | SYSTOLIC BLOOD PRESSURE: 130 MMHG | WEIGHT: 164.6 LBS | HEIGHT: 66 IN | BODY MASS INDEX: 26.45 KG/M2 | DIASTOLIC BLOOD PRESSURE: 86 MMHG

## 2024-08-13 NOTE — PROGRESS NOTES
[facility]  Weight Management Program      Karla oCnnell is a 53 y.o. female referred to the [facility] by Tiffani Quezada (3/9/23) for clinical pharmacy and specialty pharmacy management of Wegovy for weight management. Karla Connell has previously tried walking, eating healthy, and lifestyle changes for weight loss.  Current weight loss efforts include walking 2-4 times a week 30-45 minutes and Wegovy 2.4mg. Patient had her thyroid removed in 2007. Patient reports that she has lost ~100 lbs over the years by walking.    Patient is currently on Wegovy 2.4 mg weekly. Patient denies any lumps/swelling/hoarseness in neck/throat or abdominal pain.  Pateint denies any missed doses or trouble giving the injection. Patient does report that she has started increasing frequency to about 2 weeks and has been tolerating the injection well.  Patient reports tolerating medication well with only a little nausea that goes away quickly. She also reports a little constipation but that she Miralax daily and it helps.  Patient reports that she saw Dr. Pozo recently regarding low heart rate and cardiology is holding off on a pacemaker for now. She states that she will eventually need one.     Patient reports that she is doing well with her water intake goal and that she is still walking ~1 hour 4-5x/week on the treadmill. Patient's water intake varies, but she does drink throughout the day. She estimates 70-74 ounces daily.    Patient reports a goal weight of 150 lbs.    Relevant Past Medical History and Co-morbidities  Past Medical History:   Diagnosis Date    Anxiety     Arthritis     Back problem     Bladder disorder     Bradycardia     Bulging lumbar disc     Disease of thyroid gland     Headache     Hx of migraines     Neuromuscular disorder      Social History     Socioeconomic History    Marital status: Single   Tobacco Use    Smoking status: Former     Current packs/day: 0.00     Average  packs/day: 2.0 packs/day for 20.0 years (40.0 ttl pk-yrs)     Types: Cigarettes     Start date: 1995     Quit date: 2015     Years since quittin.9    Smokeless tobacco: Never   Vaping Use    Vaping status: Former   Substance and Sexual Activity    Alcohol use: Never    Drug use: Never    Sexual activity: Yes     Partners: Male     Birth control/protection: Other, None       Allergies  Patient has no known allergies.    Current Medication List    Current Outpatient Medications:     cyclobenzaprine (FLEXERIL) 5 MG tablet, Take 1 tablet by mouth 3 times a day., Disp: 90 tablet, Rfl: 3    cyclobenzaprine (FLEXERIL) 5 MG tablet, Take 1 tablet by mouth 3 times a day., Disp: 90 tablet, Rfl: 3    estrogen, conjugated,-medroxyprogesterone (Prempro) 0.3-1.5 MG per tablet, Take 1 tablet by mouth daily., Disp: 28 tablet, Rfl: 5    gabapentin (Neurontin) 600 MG tablet, Take 1 tablet by mouth 3 times a day., Disp: 90 tablet, Rfl: 2    hydrOXYzine (ATARAX) 50 MG tablet, Take 1 tablet by mouth twice daily, Disp: 180 tablet, Rfl: 2    levothyroxine (Synthroid) 88 MCG tablet, Take 1 tablet by mouth Daily., Disp: 90 tablet, Rfl: 0    montelukast (SINGULAIR) 10 MG tablet, TAKE 1 TABLET(S) BY MOUTH EACH EVENING, Disp: 90 tablet, Rfl: 2    oxybutynin (DITROPAN) 5 MG tablet, Take 1 tablet(s) by mouth 2 times daily, Disp: 180 tablet, Rfl: 2    Semaglutide-Weight Management (Wegovy) 2.4 MG/0.75ML solution auto-injector, Inject 2.4 mg under the skin into the appropriate area as directed 1 (One) Time Per Week., Disp: 9 mL, Rfl: 0    sertraline (ZOLOFT) 50 MG tablet, Take 1 tablet by mouth Daily., Disp: 30 tablet, Rfl: 3    SUMAtriptan (Imitrex) 25 MG tablet, Take 1 tablet by mouth as needed for migraine, may repeat every 2 hours as needed., Disp: 27 tablet, Rfl: 2    SUMAtriptan (Imitrex) 25 MG tablet, Take 1 tablet by mouth as needed for migraine, may repeat every 2 hours as needed., Disp: 27 tablet, Rfl: 6    Drug  "Interactions  Wegovy + Prempro -  may diminish the therapeutic effect of Antidiabetic Agents.  Wegovy + Levothyroxine- Wegovy can increase serum concentration of Levothyroxine    Relevant Laboratory Values  Lab Results   Component Value Date    CHOL 174 06/27/2024    TRIG 104 06/27/2024    HDL 74 (H) 06/27/2024    LDL 82 06/27/2024     Body mass index is 26.57 kg/m².    Vaccinations:   Patient recommended to keep up with routine vaccinations.     Goals of Therapy  Clinical Goals or Therapeutic Targets: 10% weight loss goal      Date 11/11/22 12/9/22 1/5/23 1/19/23 2/9/23 3/9/23 4/6/23 5/4/23 6/1/23   Weight (lb) 206 lbs 204.4 lbs 197.8 lb 196.2 lb 194.8 lb 191.2lb 190.2lb 184.4 lb 181.8 lb   BMI kg/ 32.75 32.5 31.45 31.19 30.97 30.39 30.27 29.32 28.9   Waist Circumference (in)  45 in 47 in 46\"   46.5\"  *changing to Wegovy when PA goes through 45\" 42\" 42\" 41\" 41\"     Date 6/30/23 7/28/23 8/31/23 9/28/23 10/26/23 11/20/23 12/18/23 1/15/24 2/13/24 3/12/24   Weight (lb) 177.2lb 173.6 lb 168.8 lb 168.2 lb 165.4 lb 164 lb 162.6 lb 160.4 lb 164.4 lb 163.6 lb   BMI kg/ 28.17 27.60 26.84 26.74 26.30 26.07 25.85 25.50 26.14 26.01   Waist Circumference (in)  40 in 42.8\" 41\" 41.5\" 41\" 42\" 39\" 41.5\" 42\" 39.75\"     Date 4/9/24 5/7/24 6/4/24 7/11/24 8/13/24   Weight (lb) 162.2 lb 162.8 lb 162 lb 163.4 lb 164.6 lb   BMI kg/ 26.18 26.28 26.15 26.37 26.57   Waist Circumference (in)  41.5\" 41\" 40\" 39.5\" 39.5\"       Medication Assessment & Plan    Patient's current BMI is 26.15, which is considered overweight. She has lost 44.6 lbs overall. Congratulated patient on her success thus far!    Will re-order Wegovy 2.4 mg SC weekly.     Discussed lifestyle modifications, including diet and exercise.     Worked with patient to create SMART Goal(s):  pt will continue to work towards these goals.   1. Increase water intake to 80 oz of water a day   2. Continue to walk 45 minutes 4 times a week    Due to patient having [employer], we " will send in 90 day fill today. She is aware she will continue follow ups until medication is completed.    Will follow-up with patient in 4 weeks, or sooner if needed.     Vielka Way, PharmD  8/13/2024  13:07 EDT

## 2024-08-27 ENCOUNTER — OFFICE VISIT (OUTPATIENT)
Dept: FAMILY MEDICINE CLINIC | Facility: CLINIC | Age: 54
End: 2024-08-27
Payer: COMMERCIAL

## 2024-08-27 VITALS
BODY MASS INDEX: 26.23 KG/M2 | SYSTOLIC BLOOD PRESSURE: 112 MMHG | WEIGHT: 163.2 LBS | HEIGHT: 66 IN | TEMPERATURE: 97.3 F | HEART RATE: 51 BPM | DIASTOLIC BLOOD PRESSURE: 62 MMHG | OXYGEN SATURATION: 99 %

## 2024-08-27 DIAGNOSIS — Z12.31 ENCOUNTER FOR SCREENING MAMMOGRAM FOR MALIGNANT NEOPLASM OF BREAST: ICD-10-CM

## 2024-08-27 DIAGNOSIS — E66.3 OVERWEIGHT (BMI 25.0-29.9): Primary | ICD-10-CM

## 2024-08-27 PROCEDURE — 99214 OFFICE O/P EST MOD 30 MIN: CPT | Performed by: FAMILY MEDICINE

## 2024-08-27 RX ORDER — LEVOTHYROXINE SODIUM 88 UG/1
88 TABLET ORAL DAILY
Qty: 90 TABLET | Refills: 0 | Status: SHIPPED | OUTPATIENT
Start: 2024-08-27

## 2024-08-27 NOTE — PROGRESS NOTES
"Chief Complaint  Obesity    Subjective          Karla Connell presents to [facility]  History of Present Illness    Patient here to follow up on weight loss. States she is doing well with medication a this time. Denies any adverse side effects. Will order repeat labs.      Review of Systems      Objective   Vital Signs:   /62 (BP Location: Right arm, Patient Position: Sitting)   Pulse 51   Temp 97.3 °F (36.3 °C) (Temporal)   Ht 167.6 cm (66\")   Wt 74 kg (163 lb 3.2 oz)   SpO2 99%   BMI 26.34 kg/m²     Physical Exam  Constitutional:       General: She is not in acute distress.     Appearance: Normal appearance. She is well-developed and well-groomed. She is not ill-appearing, toxic-appearing or diaphoretic.   HENT:      Head: Normocephalic.      Nose: Nose normal. No congestion or rhinorrhea.      Mouth/Throat:      Mouth: Mucous membranes are moist.      Pharynx: Oropharynx is clear. No oropharyngeal exudate or posterior oropharyngeal erythema.   Eyes:      General: Lids are normal.         Right eye: No discharge.         Left eye: No discharge.      Extraocular Movements: Extraocular movements intact.      Pupils: Pupils are equal, round, and reactive to light.   Neck:      Vascular: No carotid bruit.   Cardiovascular:      Rate and Rhythm: Normal rate and regular rhythm.      Pulses: Normal pulses.      Heart sounds: Normal heart sounds. No murmur heard.     No friction rub. No gallop.   Pulmonary:      Effort: Pulmonary effort is normal. No respiratory distress.      Breath sounds: Normal breath sounds. No stridor. No wheezing, rhonchi or rales.   Chest:      Chest wall: No tenderness.   Abdominal:      General: Bowel sounds are normal. There is no distension.      Palpations: Abdomen is soft. There is no mass.      Tenderness: There is no abdominal tenderness. There is no right CVA tenderness, left CVA tenderness, guarding or rebound.      Hernia: No hernia is " present.   Musculoskeletal:         General: No swelling or tenderness. Normal range of motion.      Cervical back: Normal range of motion and neck supple. No rigidity or tenderness.      Right lower leg: No edema.      Left lower leg: No edema.   Lymphadenopathy:      Cervical: No cervical adenopathy.   Skin:     General: Skin is warm.      Capillary Refill: Capillary refill takes less than 2 seconds.      Coloration: Skin is not jaundiced.      Findings: No bruising, erythema or rash.   Neurological:      General: No focal deficit present.      Mental Status: She is alert and oriented to person, place, and time.      Motor: Motor function is intact. No weakness.      Coordination: Coordination is intact.      Gait: Gait is intact. Gait normal.   Psychiatric:         Attention and Perception: Attention normal.         Mood and Affect: Mood normal.         Speech: Speech normal.         Behavior: Behavior normal.         Cognition and Memory: Cognition normal.         Judgment: Judgment normal.        Result Review :                 Assessment and Plan    Diagnoses and all orders for this visit:    1. Overweight (BMI 25.0-29.9) (Primary)  -     CBC Auto Differential; Future  -     Comprehensive Metabolic Panel; Future  -     Hemoglobin A1c; Future  -     Lipid Panel; Future  -     T4, Free; Future  -     TSH; Future    2. Encounter for screening mammogram for malignant neoplasm of breast  -     Mammo Screening Digital Tomosynthesis Bilateral With CAD    Other orders  -     levothyroxine (Synthroid) 88 MCG tablet; Take 1 tablet by mouth Daily.  Dispense: 90 tablet; Refill: 0  -     sertraline (ZOLOFT) 50 MG tablet; Take 1 tablet by mouth Daily.  Dispense: 30 tablet; Refill: 3      Patient's Body mass index is 26.34 kg/m². indicating that she is overweight (BMI 25-29.9). Patient's (Body mass index is 26.34 kg/m².) indicates that they are overweight with health conditions that include none . Weight is unchanged. BMI is  above average; BMI management plan is completed. We discussed low calorie, low carb based diet program, portion control, and increasing exercise. .    Follow Up   Return in about 3 months (around 11/27/2024), or if symptoms worsen or fail to improve.  Patient was given instructions and counseling regarding her condition or for health maintenance advice. Please see specific information pulled into the AVS if appropriate.     This document has been electronically signed by CATE Boston  August 27, 2024 09:45 EDT

## 2024-09-16 ENCOUNTER — TELEPHONE (OUTPATIENT)
Dept: FAMILY MEDICINE CLINIC | Facility: CLINIC | Age: 54
End: 2024-09-16
Payer: COMMERCIAL

## 2024-09-16 ENCOUNTER — HOSPITAL ENCOUNTER (OUTPATIENT)
Dept: MAMMOGRAPHY | Facility: HOSPITAL | Age: 54
Discharge: HOME | End: 2024-09-16
Admitting: FAMILY MEDICINE
Payer: COMMERCIAL

## 2024-09-16 PROCEDURE — 77067 SCR MAMMO BI INCL CAD: CPT

## 2024-09-16 PROCEDURE — 77063 BREAST TOMOSYNTHESIS BI: CPT | Performed by: RADIOLOGY

## 2024-09-16 PROCEDURE — 77067 SCR MAMMO BI INCL CAD: CPT | Performed by: RADIOLOGY

## 2024-09-16 PROCEDURE — 77063 BREAST TOMOSYNTHESIS BI: CPT

## 2024-09-25 ENCOUNTER — LAB (OUTPATIENT)
Dept: LAB | Facility: HOSPITAL | Age: 54
End: 2024-09-25
Payer: COMMERCIAL

## 2024-09-25 DIAGNOSIS — E66.3 OVERWEIGHT (BMI 25.0-29.9): ICD-10-CM

## 2024-09-25 PROCEDURE — 84439 ASSAY OF FREE THYROXINE: CPT

## 2024-09-25 PROCEDURE — 80050 GENERAL HEALTH PANEL: CPT

## 2024-09-25 PROCEDURE — 80061 LIPID PANEL: CPT

## 2024-09-25 PROCEDURE — 83036 HEMOGLOBIN GLYCOSYLATED A1C: CPT

## 2024-09-25 PROCEDURE — 36415 COLL VENOUS BLD VENIPUNCTURE: CPT

## 2024-09-26 LAB
ALBUMIN SERPL-MCNC: 4.1 G/DL (ref 3.5–5.2)
ALBUMIN/GLOB SERPL: 2 G/DL
ALP SERPL-CCNC: 69 U/L (ref 39–117)
ALT SERPL W P-5'-P-CCNC: 19 U/L (ref 1–33)
ANION GAP SERPL CALCULATED.3IONS-SCNC: 8 MMOL/L (ref 5–15)
AST SERPL-CCNC: 23 U/L (ref 1–32)
BASOPHILS # BLD AUTO: 0.06 10*3/MM3 (ref 0–0.2)
BASOPHILS NFR BLD AUTO: 0.8 % (ref 0–1.5)
BILIRUB SERPL-MCNC: 0.2 MG/DL (ref 0–1.2)
BUN SERPL-MCNC: 11 MG/DL (ref 6–20)
BUN/CREAT SERPL: 14.3 (ref 7–25)
CALCIUM SPEC-SCNC: 9.5 MG/DL (ref 8.6–10.5)
CHLORIDE SERPL-SCNC: 104 MMOL/L (ref 98–107)
CHOLEST SERPL-MCNC: 189 MG/DL (ref 0–200)
CO2 SERPL-SCNC: 28 MMOL/L (ref 22–29)
CREAT SERPL-MCNC: 0.77 MG/DL (ref 0.57–1)
DEPRECATED RDW RBC AUTO: 45.3 FL (ref 37–54)
EGFRCR SERPLBLD CKD-EPI 2021: 91.8 ML/MIN/1.73
EOSINOPHIL # BLD AUTO: 0.19 10*3/MM3 (ref 0–0.4)
EOSINOPHIL NFR BLD AUTO: 2.7 % (ref 0.3–6.2)
ERYTHROCYTE [DISTWIDTH] IN BLOOD BY AUTOMATED COUNT: 13.3 % (ref 12.3–15.4)
GLOBULIN UR ELPH-MCNC: 2.1 GM/DL
GLUCOSE SERPL-MCNC: 72 MG/DL (ref 65–99)
HBA1C MFR BLD: 5.2 % (ref 4.8–5.6)
HCT VFR BLD AUTO: 43.6 % (ref 34–46.6)
HDLC SERPL-MCNC: 63 MG/DL (ref 40–60)
HGB BLD-MCNC: 14.2 G/DL (ref 12–15.9)
IMM GRANULOCYTES # BLD AUTO: 0.02 10*3/MM3 (ref 0–0.05)
IMM GRANULOCYTES NFR BLD AUTO: 0.3 % (ref 0–0.5)
LDLC SERPL CALC-MCNC: 109 MG/DL (ref 0–100)
LDLC/HDLC SERPL: 1.7 {RATIO}
LYMPHOCYTES # BLD AUTO: 1.66 10*3/MM3 (ref 0.7–3.1)
LYMPHOCYTES NFR BLD AUTO: 23.2 % (ref 19.6–45.3)
MCH RBC QN AUTO: 30.1 PG (ref 26.6–33)
MCHC RBC AUTO-ENTMCNC: 32.6 G/DL (ref 31.5–35.7)
MCV RBC AUTO: 92.4 FL (ref 79–97)
MONOCYTES # BLD AUTO: 0.65 10*3/MM3 (ref 0.1–0.9)
MONOCYTES NFR BLD AUTO: 9.1 % (ref 5–12)
NEUTROPHILS NFR BLD AUTO: 4.58 10*3/MM3 (ref 1.7–7)
NEUTROPHILS NFR BLD AUTO: 63.9 % (ref 42.7–76)
NRBC BLD AUTO-RTO: 0 /100 WBC (ref 0–0.2)
PLATELET # BLD AUTO: 288 10*3/MM3 (ref 140–450)
PMV BLD AUTO: 10.8 FL (ref 6–12)
POTASSIUM SERPL-SCNC: 4.3 MMOL/L (ref 3.5–5.2)
PROT SERPL-MCNC: 6.2 G/DL (ref 6–8.5)
RBC # BLD AUTO: 4.72 10*6/MM3 (ref 3.77–5.28)
SODIUM SERPL-SCNC: 140 MMOL/L (ref 136–145)
T4 FREE SERPL-MCNC: 1.24 NG/DL (ref 0.92–1.68)
TRIGL SERPL-MCNC: 93 MG/DL (ref 0–150)
TSH SERPL DL<=0.05 MIU/L-ACNC: 0.15 UIU/ML (ref 0.27–4.2)
VLDLC SERPL-MCNC: 17 MG/DL (ref 5–40)
WBC NRBC COR # BLD AUTO: 7.16 10*3/MM3 (ref 3.4–10.8)

## 2024-10-01 ENCOUNTER — TELEPHONE (OUTPATIENT)
Dept: FAMILY MEDICINE CLINIC | Facility: CLINIC | Age: 54
End: 2024-10-01
Payer: COMMERCIAL

## 2024-10-01 NOTE — TELEPHONE ENCOUNTER
----- Message from Tiffani Quezada sent at 10/1/2024 12:03 PM EDT -----  Please let patient know results are normal. Thank you.

## 2024-11-01 ENCOUNTER — HOSPITAL ENCOUNTER (OUTPATIENT)
Dept: GENERAL RADIOLOGY | Facility: HOSPITAL | Age: 54
Discharge: HOME | End: 2024-11-01
Admitting: PHYSICIAN ASSISTANT
Payer: COMMERCIAL

## 2024-11-01 ENCOUNTER — TELEPHONE (OUTPATIENT)
Dept: GENERAL RADIOLOGY | Facility: HOSPITAL | Age: 54
End: 2024-11-01
Payer: COMMERCIAL

## 2024-11-01 ENCOUNTER — TRANSCRIBE ORDERS (OUTPATIENT)
Dept: ADMINISTRATIVE | Facility: HOSPITAL | Age: 54
End: 2024-11-01
Payer: COMMERCIAL

## 2024-11-01 DIAGNOSIS — R07.82 INTERCOSTAL PAIN: ICD-10-CM

## 2024-11-01 DIAGNOSIS — R07.82 INTERCOSTAL PAIN: Primary | ICD-10-CM

## 2024-11-01 PROCEDURE — 71101 X-RAY EXAM UNILAT RIBS/CHEST: CPT

## 2024-11-01 PROCEDURE — 71101 X-RAY EXAM UNILAT RIBS/CHEST: CPT | Performed by: RADIOLOGY

## 2024-11-09 ENCOUNTER — HOSPITAL ENCOUNTER (OUTPATIENT)
Facility: HOSPITAL | Age: 54
Discharge: HOME | End: 2024-11-09
Payer: COMMERCIAL

## 2024-11-09 DIAGNOSIS — F17.210 CIGARETTE SMOKER: ICD-10-CM

## 2024-11-09 PROCEDURE — 71271 CT THORAX LUNG CANCER SCR C-: CPT

## 2024-11-09 PROCEDURE — 71271 CT THORAX LUNG CANCER SCR C-: CPT | Performed by: RADIOLOGY

## 2024-12-04 RX ORDER — LEVOTHYROXINE SODIUM 88 UG/1
88 TABLET ORAL DAILY
Qty: 90 TABLET | Refills: 0 | OUTPATIENT
Start: 2024-12-04

## 2025-04-29 ENCOUNTER — HOSPITAL ENCOUNTER (OUTPATIENT)
Dept: GENERAL RADIOLOGY | Facility: HOSPITAL | Age: 55
Discharge: HOME | End: 2025-04-29
Payer: COMMERCIAL

## 2025-04-29 ENCOUNTER — TRANSCRIBE ORDERS (OUTPATIENT)
Dept: ADMINISTRATIVE | Facility: HOSPITAL | Age: 55
End: 2025-04-29
Payer: COMMERCIAL

## 2025-04-29 ENCOUNTER — LAB (OUTPATIENT)
Dept: LAB | Facility: HOSPITAL | Age: 55
End: 2025-04-29
Payer: COMMERCIAL

## 2025-04-29 DIAGNOSIS — M25.552 PAIN IN LEFT HIP: Primary | ICD-10-CM

## 2025-04-29 DIAGNOSIS — E03.4 ATROPHY OF THYROID: ICD-10-CM

## 2025-04-29 DIAGNOSIS — M25.552 PAIN IN LEFT HIP: ICD-10-CM

## 2025-04-29 LAB
ALBUMIN SERPL-MCNC: 4 G/DL (ref 3.5–5.2)
ALBUMIN/GLOB SERPL: 1.7 G/DL
ALP SERPL-CCNC: 103 U/L (ref 39–117)
ALT SERPL W P-5'-P-CCNC: 24 U/L (ref 1–33)
ANION GAP SERPL CALCULATED.3IONS-SCNC: 13.5 MMOL/L (ref 5–15)
AST SERPL-CCNC: 29 U/L (ref 1–32)
BASOPHILS # BLD AUTO: 0.07 10*3/MM3 (ref 0–0.2)
BASOPHILS NFR BLD AUTO: 0.8 % (ref 0–1.5)
BILIRUB SERPL-MCNC: <0.2 MG/DL (ref 0–1.2)
BUN SERPL-MCNC: 16 MG/DL (ref 6–20)
BUN/CREAT SERPL: 20.8 (ref 7–25)
CALCIUM SPEC-SCNC: 9 MG/DL (ref 8.6–10.5)
CHLORIDE SERPL-SCNC: 104 MMOL/L (ref 98–107)
CHOLEST SERPL-MCNC: 195 MG/DL (ref 0–200)
CO2 SERPL-SCNC: 23.5 MMOL/L (ref 22–29)
CREAT SERPL-MCNC: 0.77 MG/DL (ref 0.57–1)
DEPRECATED RDW RBC AUTO: 47.7 FL (ref 37–54)
EGFRCR SERPLBLD CKD-EPI 2021: 91.2 ML/MIN/1.73
EOSINOPHIL # BLD AUTO: 0.32 10*3/MM3 (ref 0–0.4)
EOSINOPHIL NFR BLD AUTO: 3.5 % (ref 0.3–6.2)
ERYTHROCYTE [DISTWIDTH] IN BLOOD BY AUTOMATED COUNT: 14 % (ref 12.3–15.4)
GLOBULIN UR ELPH-MCNC: 2.3 GM/DL
GLUCOSE SERPL-MCNC: 64 MG/DL (ref 65–99)
HCT VFR BLD AUTO: 41.1 % (ref 34–46.6)
HDLC SERPL-MCNC: 53 MG/DL (ref 40–60)
HGB BLD-MCNC: 13.6 G/DL (ref 12–15.9)
IMM GRANULOCYTES # BLD AUTO: 0.05 10*3/MM3 (ref 0–0.05)
IMM GRANULOCYTES NFR BLD AUTO: 0.6 % (ref 0–0.5)
LDLC SERPL CALC-MCNC: 110 MG/DL (ref 0–100)
LDLC/HDLC SERPL: 1.97 {RATIO}
LYMPHOCYTES # BLD AUTO: 2.49 10*3/MM3 (ref 0.7–3.1)
LYMPHOCYTES NFR BLD AUTO: 27.5 % (ref 19.6–45.3)
MCH RBC QN AUTO: 30.4 PG (ref 26.6–33)
MCHC RBC AUTO-ENTMCNC: 33.1 G/DL (ref 31.5–35.7)
MCV RBC AUTO: 91.9 FL (ref 79–97)
MONOCYTES # BLD AUTO: 0.92 10*3/MM3 (ref 0.1–0.9)
MONOCYTES NFR BLD AUTO: 10.2 % (ref 5–12)
NEUTROPHILS NFR BLD AUTO: 5.19 10*3/MM3 (ref 1.7–7)
NEUTROPHILS NFR BLD AUTO: 57.4 % (ref 42.7–76)
NRBC BLD AUTO-RTO: 0 /100 WBC (ref 0–0.2)
PLATELET # BLD AUTO: 290 10*3/MM3 (ref 140–450)
PMV BLD AUTO: 10.2 FL (ref 6–12)
POTASSIUM SERPL-SCNC: 4.1 MMOL/L (ref 3.5–5.2)
PROT SERPL-MCNC: 6.3 G/DL (ref 6–8.5)
RBC # BLD AUTO: 4.47 10*6/MM3 (ref 3.77–5.28)
SODIUM SERPL-SCNC: 141 MMOL/L (ref 136–145)
TRIGL SERPL-MCNC: 187 MG/DL (ref 0–150)
TSH SERPL DL<=0.05 MIU/L-ACNC: 2.17 UIU/ML (ref 0.27–4.2)
VLDLC SERPL-MCNC: 32 MG/DL (ref 5–40)
WBC NRBC COR # BLD AUTO: 9.04 10*3/MM3 (ref 3.4–10.8)

## 2025-04-29 PROCEDURE — 36415 COLL VENOUS BLD VENIPUNCTURE: CPT

## 2025-04-29 PROCEDURE — 73502 X-RAY EXAM HIP UNI 2-3 VIEWS: CPT | Performed by: RADIOLOGY

## 2025-04-29 PROCEDURE — 73502 X-RAY EXAM HIP UNI 2-3 VIEWS: CPT

## 2025-04-29 PROCEDURE — 80061 LIPID PANEL: CPT

## 2025-04-29 PROCEDURE — 80050 GENERAL HEALTH PANEL: CPT

## 2025-05-22 ENCOUNTER — HOSPITAL ENCOUNTER (OUTPATIENT)
Dept: PHYSICAL THERAPY | Facility: HOSPITAL | Age: 55
Setting detail: THERAPIES SERIES
Discharge: HOME | End: 2025-05-22
Payer: COMMERCIAL

## 2025-05-22 DIAGNOSIS — M79.605 LEFT LEG PAIN: Primary | ICD-10-CM

## 2025-05-22 DIAGNOSIS — M25.552 ACUTE HIP PAIN, LEFT: ICD-10-CM

## 2025-05-22 PROCEDURE — 97110 THERAPEUTIC EXERCISES: CPT | Performed by: PHYSICAL THERAPIST

## 2025-05-22 PROCEDURE — 97162 PT EVAL MOD COMPLEX 30 MIN: CPT | Performed by: PHYSICAL THERAPIST

## 2025-05-22 PROCEDURE — G0283 ELEC STIM OTHER THAN WOUND: HCPCS | Performed by: PHYSICAL THERAPIST

## 2025-05-22 NOTE — THERAPY EVALUATION
Physical Therapy Initial Evaluation and Plan of Care    Patient: Karla Connell   : 1970  Diagnosis/ICD-10 Code:  Referring practitioner: RONNIE Judge  Date of Initial Visit: 2025  Today's Date: 2025  Patient seen for 1 session         Visit Diagnoses:    ICD-10-CM ICD-9-CM   1. Left leg pain  M79.605 729.5   2. Acute hip pain, left  M25.552 719.45         Subjective Questionnaire: LEFS: 74%      Subjective Evaluation    History of Present Illness  Mechanism of injury: The patient has suffered from back and left leg pain from sciatica for the past ten years. Six months ago, her pain increased due to an increased workload.  The patient was evaluated by her PCP around two months ago for her pain. The patient continues to have increased pain that extends along the lateral left leg into the left foot.  The patient has been advised to initiate therapy for improved function.  Pt may receive an  MRI following therapy if indicated.      Patient Occupation: CNA Quality of life: good    Pain  Current pain ratin  At best pain ratin  At worst pain rating: 10  Location: left leg  Quality: sharp and dull ache  Relieving factors: change in position, rest and medications  Aggravating factors: lifting, movement, standing, ambulation, prolonged positioning and repetitive movement  Progression: worsening    Hand dominance: left    Diagnostic Tests  X-ray: normal    Patient Goals  Patient goals for therapy: decreased pain, increased motion, increased strength, independence with ADLs/IADLs, return to sport/leisure activities and return to work           Objective          Postural Observations    Additional Postural Observation Details  Slumped posture with fwd haed  Right list      Palpation     Additional Palpation Details  Tenderness in left glut    Neurological Testing     Sensation     Lumbar   Left   Diminished: light touch    Right   Intact: light touch    Active Range of Motion     Lumbar    Flexion: 50 degrees   Extension: 50 degrees   Left rotation: 75 degrees   Right rotation: 75 degrees     Strength/Myotome Testing     Left Hip   Planes of Motion   Flexion: 3+    Right Hip   Planes of Motion   Flexion: 4+    Left Knee   Flexion: 4  Extension: 4    Right Knee   Flexion: 4+  Extension: 4+    Left Ankle/Foot   Dorsiflexion: 4-    Right Ankle/Foot   Dorsiflexion: 4+    Tests     Lumbar   Positive repeated flexion.     Additional Tests Details  Positive left slump    Ambulation     Comments   Pt amb with antalgic gait with decreased stance on left          Assessment & Plan       Assessment  Impairments: abnormal coordination, abnormal gait, abnormal muscle firing, abnormal muscle tone, abnormal or restricted ROM, activity intolerance, impaired balance, impaired physical strength, lacks appropriate home exercise program and pain with function   Functional limitations: carrying objects, lifting, pulling, pushing, uncomfortable because of pain, standing, stooping, reaching behind back, reaching overhead and unable to perform repetitive tasks   Assessment details: Pt is a 53 y/o female referred to therapy for treatment of back and left leg pain.  Pt presents with symptoms consistent with a left posterior disc derangement of the lumbar spine.  Therapy will follow for centralization of symptoms and decreased pain.    Prognosis: good    Goals  Plan Goals: STG 6 weeks    1 Pt will be instructed in a HEP.  2 Pt will report a 50% decrease in radicular symptoms.  3 Pt will report pain no greater than 6/10.    LTG 12 weeks    1 Pt will improve her LEFs to less than 30%.  2 Pt  will report a 75% decrease in radicular symptoms.  3 Pt will demonstrate 4+/5 gross LE strength.  4 Pt will report pain no greater than 3/10 with moderate lifting.    Plan  Therapy options: will be seen for skilled therapy services  Planned modality interventions: cryotherapy, thermotherapy (hydrocollator packs), ultrasound and  TENS  Planned therapy interventions: abdominal trunk stabilization, ADL retraining, balance/weight-bearing training, body mechanics training, flexibility, gait training, fine motor coordination training, home exercise program, IADL retraining, joint mobilization, manual therapy, motor coordination training, neuromuscular re-education, postural training, soft tissue mobilization, spinal/joint mobilization, strengthening, stretching and therapeutic activities  Frequency: 2x week  Duration in weeks: 12  Treatment plan discussed with: patient  Plan details: Will follow for optimal gains.  Moderate Evaluation  45603  Re-evaluation   52509    Therapeutic exercise  24869  Therapeutic activity    66849  Neuromuscular re-education   28430  Manual therapy   69762  Gait training  20481  Unattended e-stim (Private)  06337  Moist heat/cryotherapy 11103   Ultrasound   26949  Mechanical traction 71477            Timed:         Manual Therapy:         mins  94668;     Therapeutic Exercise:    10     mins  80094;     Neuromuscular Bahman:        mins  96106;    Therapeutic Activity:          mins  66205;     Gait Training:           mins  41827;     Ultrasound:          mins  21152;    Ionto                                   mins   10339  Self Care                            mins   85574  Canalith Repos         mins 65828      Un-Timed:  Electrical Stimulation:    10     mins  24576 ( );  Dry Needling          mins self-pay  Traction          mins 76417  Low Eval          Mins  66003  Mod Eval     40     Mins  64864  High Eval                            Mins  05819        Timed Treatment:   10   mins   Total Treatment:     60   mins          PT: Arpit Chau PT     License Number: [id number]  Electronically signed by Arpit Chau PT, 05/22/25, 10:01 AM EDT    Certification Period: 5/22/2025 thru 8/19/2025  I certify that the therapy services are furnished while this patient is under my care.  The services outlined  above are required by this patient, and will be reviewed every 90 days.         Physician Signature:__________________________________________________    PHYSICIAN: Whit Cortez PA  NPI: [id number]                                      DATE:      Please sign and return via fax to [email or web address] . Thank you, [facility].

## 2025-05-27 ENCOUNTER — APPOINTMENT (OUTPATIENT)
Dept: PHYSICAL THERAPY | Facility: HOSPITAL | Age: 55
End: 2025-05-27
Payer: COMMERCIAL

## 2025-06-05 ENCOUNTER — HOSPITAL ENCOUNTER (OUTPATIENT)
Dept: PHYSICAL THERAPY | Facility: HOSPITAL | Age: 55
Setting detail: THERAPIES SERIES
Discharge: HOME | End: 2025-06-05
Payer: COMMERCIAL

## 2025-06-05 DIAGNOSIS — M79.605 LEFT LEG PAIN: Primary | ICD-10-CM

## 2025-06-05 DIAGNOSIS — M25.552 ACUTE HIP PAIN, LEFT: ICD-10-CM

## 2025-06-05 PROCEDURE — 97110 THERAPEUTIC EXERCISES: CPT | Performed by: PHYSICAL THERAPIST

## 2025-06-05 PROCEDURE — 97140 MANUAL THERAPY 1/> REGIONS: CPT | Performed by: PHYSICAL THERAPIST

## 2025-06-05 PROCEDURE — G0283 ELEC STIM OTHER THAN WOUND: HCPCS | Performed by: PHYSICAL THERAPIST

## 2025-06-05 NOTE — THERAPY EVALUATION
Physical Therapy Daily Treatment Note      Patient: Karla Connell   : 1970  Referring practitioner: RONNIE Judge  Date of Initial Visit: Type: THERAPY  Episode: Left hip/leg apin  Today's Date: 2025  Patient seen for 2 sessions       Visit Diagnoses:    ICD-10-CM ICD-9-CM   1. Left leg pain  M79.605 729.5   2. Acute hip pain, left  M25.552 719.45       Subjective Evaluation    History of Present Illness    Subjective comment: Pt has 2/10 pain today.       Objective   See Exercise, Manual, and Modality Logs for complete treatment.       Assessment & Plan       Assessment  Assessment details: Tx today consisted of exercises for improved postural stability and centralization of symptoms; followed by stm to lower back and ended with ice and estim for decreased pain.  Pt demonstrated good effort with no pain following session.    Plan  Plan details: Will follow progressing core stability and decreased pain.          Timed:         Manual Therapy:    12     mins  53595;     Therapeutic Exercise:    17     mins  89080;     Neuromuscular Bahman:        mins  53340;    Therapeutic Activity:          mins  88731;     Gait Training:           mins  03396;     Ultrasound:          mins  65664;    Ionto                                   mins   67301  Self Care                            mins   28002  Canalith Repos         mins 16834      Un-Timed:  Electrical Stimulation:    10     mins  95278 ( );  Dry Needling          mins self-pay  Traction          mins 05469      Timed Treatment:   29   mins   Total Treatment:     39   mins    Arpit Chau PT  [place] License: [id number]      Electronically signed by Arpit Chau PT, 25, 8:51 AM EDT

## 2025-06-06 ENCOUNTER — HOSPITAL ENCOUNTER (OUTPATIENT)
Dept: PHYSICAL THERAPY | Facility: HOSPITAL | Age: 55
Setting detail: THERAPIES SERIES
Discharge: HOME | End: 2025-06-06
Payer: COMMERCIAL

## 2025-06-06 DIAGNOSIS — M79.605 LEFT LEG PAIN: Primary | ICD-10-CM

## 2025-06-06 DIAGNOSIS — M25.552 ACUTE HIP PAIN, LEFT: ICD-10-CM

## 2025-06-06 PROCEDURE — 97110 THERAPEUTIC EXERCISES: CPT | Performed by: PHYSICAL THERAPIST

## 2025-06-06 PROCEDURE — 97140 MANUAL THERAPY 1/> REGIONS: CPT | Performed by: PHYSICAL THERAPIST

## 2025-06-06 PROCEDURE — G0283 ELEC STIM OTHER THAN WOUND: HCPCS | Performed by: PHYSICAL THERAPIST

## 2025-06-06 NOTE — PROGRESS NOTES
Physical Therapy Daily Treatment Note      Patient: Karla Connell   : 1970  Referring practitioner: RONNIE Judge  Date of Initial Visit: Type: THERAPY  Episode: Left hip/leg apin  Today's Date: 2025  Patient seen for 3 sessions       Visit Diagnoses:    ICD-10-CM ICD-9-CM   1. Left leg pain  M79.605 729.5   2. Acute hip pain, left  M25.552 719.45       Subjective Evaluation    History of Present Illness    Subjective comment: Patient mentions that she is a little sore from mowing her yard yesterday.Pain  Current pain rating: 3         Objective   See Exercise, Manual, and Modality Logs for complete treatment.       Assessment & Plan       Assessment  Assessment details: Patient tolerated today's session well, noting slight decrease in pain post-tx.  Treatment initiated with there ex, followed by STM and ice/ESTIM.  There ex progressed slightly to include the addition of midrows with theraband and prone lying.  STM performed to lumbar musculature and left gluteals, with tenderness reported; increased muscle guarding was noted at left lumbar paraspinals.  She will continue to be progressed per her tolerance and POC.          Timed:         Manual Therapy:    10     mins  81509;     Therapeutic Exercise:    21     mins  54493;     Neuromuscular Bahman:        mins  77292;    Therapeutic Activity:          mins  62397;     Gait Training:           mins  93684;     Ultrasound:          mins  12143;    Ionto                                   mins   34072  Self Care                            mins   74640      Un-Timed:  Electrical Stimulation:    10     mins  00678 ( );  Dry Needling          mins self-pay  Traction          mins 60415  Canalith Repos         mins 81037    Timed Treatment:   31   mins   Total Treatment:      41  mins    Maryana Romero PT  [place] License: [id number]  Electronically signed by Maryana Romero PT, 25, 8:44 AM EDT.

## 2025-06-09 ENCOUNTER — HOSPITAL ENCOUNTER (OUTPATIENT)
Dept: PHYSICAL THERAPY | Facility: HOSPITAL | Age: 55
Setting detail: THERAPIES SERIES
Discharge: HOME | End: 2025-06-09
Payer: COMMERCIAL

## 2025-06-09 DIAGNOSIS — M79.605 LEFT LEG PAIN: Primary | ICD-10-CM

## 2025-06-09 DIAGNOSIS — M25.552 ACUTE HIP PAIN, LEFT: ICD-10-CM

## 2025-06-09 PROCEDURE — 97140 MANUAL THERAPY 1/> REGIONS: CPT | Performed by: PHYSICAL THERAPIST

## 2025-06-09 PROCEDURE — G0283 ELEC STIM OTHER THAN WOUND: HCPCS | Performed by: PHYSICAL THERAPIST

## 2025-06-09 PROCEDURE — 97110 THERAPEUTIC EXERCISES: CPT | Performed by: PHYSICAL THERAPIST

## 2025-06-09 NOTE — THERAPY EVALUATION
Physical Therapy Daily Treatment Note      Patient: Karla Connell   : 1970  Referring practitioner: RONNIE Judge  Date of Initial Visit: Type: THERAPY  Episode: Left hip/leg apin  Today's Date: 2025  Patient seen for 4 sessions       Visit Diagnoses:    ICD-10-CM ICD-9-CM   1. Left leg pain  M79.605 729.5   2. Acute hip pain, left  M25.552 719.45       Subjective Evaluation    History of Present Illness    Subjective comment: Pt has 1/10 pain today.  Pt responded well to last session.       Objective   See Exercise, Manual, and Modality Logs for complete treatment.       Assessment & Plan       Assessment  Assessment details: Tx today consisted of exercises for improved centralization of symptoms and decreased pain; followed by stm to lower back and ended with ice and estim for decreased pain. Pt responded well to tx with decreased pain to 0/10.    Plan  Plan details: Will follow progressing core stability and decreased pain.          Timed:         Manual Therapy:  10       mins  55723;     Therapeutic Exercise:    16     mins  47523;     Neuromuscular Bahman:        mins  29643;    Therapeutic Activity:          mins  85857;     Gait Training:           mins  93701;     Ultrasound:          mins  10558;    Ionto                                   mins   41896  Self Care                            mins   21805  Canalith Repos         mins 74618      Un-Timed:  Electrical Stimulation:    10     mins  73539 ( );  Dry Needling          mins self-pay  Traction          mins 43814      Timed Treatment:   26   mins   Total Treatment:     36   mins    Arpit Chau PT  [place] License: [id number]      Electronically signed by Arpit Chau PT, 25, 8:02 AM EDT

## 2025-06-19 ENCOUNTER — HOSPITAL ENCOUNTER (OUTPATIENT)
Dept: PHYSICAL THERAPY | Facility: HOSPITAL | Age: 55
Setting detail: THERAPIES SERIES
Discharge: HOME | End: 2025-06-19
Payer: COMMERCIAL

## 2025-06-19 DIAGNOSIS — M25.552 ACUTE HIP PAIN, LEFT: ICD-10-CM

## 2025-06-19 DIAGNOSIS — M79.605 LEFT LEG PAIN: Primary | ICD-10-CM

## 2025-06-19 PROCEDURE — G0283 ELEC STIM OTHER THAN WOUND: HCPCS | Performed by: PHYSICAL THERAPIST

## 2025-06-19 PROCEDURE — 97110 THERAPEUTIC EXERCISES: CPT | Performed by: PHYSICAL THERAPIST

## 2025-06-19 PROCEDURE — 97140 MANUAL THERAPY 1/> REGIONS: CPT | Performed by: PHYSICAL THERAPIST

## 2025-06-19 NOTE — PROGRESS NOTES
Physical Therapy Daily Treatment Note      Patient: Karla Connell   : 1970  Referring practitioner: RONNIE Judge  Date of Initial Visit: Type: THERAPY  Episode: Left hip/leg apin  Today's Date: 2025  Patient seen for 5 sessions       Visit Diagnoses:    ICD-10-CM ICD-9-CM   1. Left leg pain  M79.605 729.5   2. Acute hip pain, left  M25.552 719.45       Subjective Evaluation    History of Present Illness    Subjective comment: Patient reports increased pain today, noting that she has worked for 8 days in a row.       Objective   See Exercise, Manual, and Modality Logs for complete treatment.       Assessment & Plan       Assessment  Assessment details: Patient tolerated today's session well, noting a decrease in pain post-x.  There ex progressed slightly to include prone press ups on elbows and low rows with theraband.  Patient provided with verbal and visual cues during session to ensure proper form with exercises.  STM performed at lumbar musculature, with muscle guarding noted to be greater on the left.  Session concluded with ice/ESTIM, with no skin irritation following.  She will continue to be progressed per her tolerance and POC.          Timed:         Manual Therapy:    18     mins  60194;     Therapeutic Exercise:    22     mins  29022;     Neuromuscular Bahman:        mins  91516;    Therapeutic Activity:          mins  51993;     Gait Training:           mins  30317;     Ultrasound:          mins  79002;    Ionto                                   mins   52311  Self Care                            mins   24631      Un-Timed:  Electrical Stimulation:    10     mins  81323 ( );  Dry Needling          mins self-pay  Traction          mins 34798  Canalith Repos         mins 24346    Timed Treatment:   40   mins   Total Treatment:     50   mins    Maryana Romero PT  [place] License: [id number]  Electronically signed by Maryana Romero, MARIA TERESA, 25, 9:40 AM  EDT.

## 2025-06-20 ENCOUNTER — HOSPITAL ENCOUNTER (OUTPATIENT)
Dept: PHYSICAL THERAPY | Facility: HOSPITAL | Age: 55
Setting detail: THERAPIES SERIES
Discharge: HOME | End: 2025-06-20
Payer: COMMERCIAL

## 2025-06-20 DIAGNOSIS — M25.552 ACUTE HIP PAIN, LEFT: ICD-10-CM

## 2025-06-20 DIAGNOSIS — M79.605 LEFT LEG PAIN: Primary | ICD-10-CM

## 2025-06-20 PROCEDURE — 97140 MANUAL THERAPY 1/> REGIONS: CPT | Performed by: PHYSICAL THERAPIST

## 2025-06-20 PROCEDURE — G0283 ELEC STIM OTHER THAN WOUND: HCPCS | Performed by: PHYSICAL THERAPIST

## 2025-06-20 PROCEDURE — 97110 THERAPEUTIC EXERCISES: CPT | Performed by: PHYSICAL THERAPIST

## 2025-06-20 NOTE — PROGRESS NOTES
Physical Therapy Daily Treatment Note      Patient: Karla Connell   : 1970  Referring practitioner: RONNIE Judge  Date of Initial Visit: Type: THERAPY  Episode: Left hip/leg apin  Today's Date: 2025  Patient seen for 6 sessions       Visit Diagnoses:    ICD-10-CM ICD-9-CM   1. Left leg pain  M79.605 729.5   2. Acute hip pain, left  M25.552 719.45       Subjective Evaluation    History of Present Illness    Subjective comment: Patient reports 7/10 pain today.       Objective   See Exercise, Manual, and Modality Logs for complete treatment.       Assessment & Plan       Assessment  Assessment details: Therapy session consisted of there ex, manual therapy, ice/ESTIM.  No adverse reactions were noted with modalities.  There ex continues to focus on lumbar ROM, core strengthening, postural awareness, and extension preference.  Manual lumbar traction added at today's session to help decrease radicular pain in the left LE; patient displayed good tolerance with manual lumbar traction.  She will continue to be progressed per her tolerance and POC.          Timed:         Manual Therapy:    24     mins  97808;     Therapeutic Exercise:    21     mins  12179;     Neuromuscular Bahman:        mins  48364;    Therapeutic Activity:          mins  43724;     Gait Training:           mins  45828;     Ultrasound:          mins  74915;    Ionto                                   mins   52428  Self Care                            mins   39944      Un-Timed:  Electrical Stimulation:     10    mins  16843 ( );  Dry Needling          mins self-pay  Traction          mins 61469  Canalith Repos         mins 31865    Timed Treatment:   45   mins   Total Treatment:     55   mins    Maryana Romero PT  [place] License: [id number]  Electronically signed by Maryana Romero PT, 25, 9:20 AM EDT.

## 2025-06-23 ENCOUNTER — HOSPITAL ENCOUNTER (OUTPATIENT)
Dept: PHYSICAL THERAPY | Facility: HOSPITAL | Age: 55
Setting detail: THERAPIES SERIES
Discharge: HOME | End: 2025-06-23
Payer: COMMERCIAL

## 2025-06-23 DIAGNOSIS — M79.605 LEFT LEG PAIN: Primary | ICD-10-CM

## 2025-06-23 DIAGNOSIS — M25.552 ACUTE HIP PAIN, LEFT: ICD-10-CM

## 2025-06-23 PROCEDURE — G0283 ELEC STIM OTHER THAN WOUND: HCPCS | Performed by: PHYSICAL THERAPIST

## 2025-06-23 PROCEDURE — 97110 THERAPEUTIC EXERCISES: CPT | Performed by: PHYSICAL THERAPIST

## 2025-06-23 PROCEDURE — 97140 MANUAL THERAPY 1/> REGIONS: CPT | Performed by: PHYSICAL THERAPIST

## 2025-06-23 NOTE — PROGRESS NOTES
Physical Therapy Daily Treatment Note      Patient: Karla Connell   : 1970  Referring practitioner: RONNIE Judge  Date of Initial Visit: Type: THERAPY  Episode: Left hip/leg apin  Today's Date: 2025  Patient seen for 7 sessions       Visit Diagnoses:    ICD-10-CM ICD-9-CM   1. Left leg pain  M79.605 729.5   2. Acute hip pain, left  M25.552 719.45       Subjective Evaluation    History of Present Illness    Subjective comment: Patient reports that she thinks manual traction helped at last session.  She notes she mowed her yard over the weekend and it didn't bother her back as much.       Objective   See Exercise, Manual, and Modality Logs for complete treatment.       Assessment & Plan       Assessment  Assessment details: Patient tolerated today's session well, noting 0/10 pain at conclusion of session.  Treatment initiated with manual therapy to encourage improved pain, improved mobility, and decreased radicular pain.  There ex progressed slightly to include increased repetitions.  Session concluded with ice/ESTIM, with no skin irritation following.  She will continue to be progressed per her tolerance and POC.          Timed:         Manual Therapy:    25     mins  45908;     Therapeutic Exercise:    24     mins  99180;     Neuromuscular Bahman:        mins  26086;    Therapeutic Activity:          mins  66643;     Gait Training:           mins  34633;     Ultrasound:          mins  84971;    Ionto                                   mins   19540  Self Care                            mins   02004      Un-Timed:  Electrical Stimulation:    10     mins  89999 ( );  Dry Needling          mins self-pay  Traction          mins 39637  Canalith Repos         mins 08320    Timed Treatment:   49   mins   Total Treatment:     59   mins    Maryana Romero PT  [place] License: [id number]  Electronically signed by Maryana Romero PT, 25, 9:17 AM EDT.

## 2025-06-24 ENCOUNTER — HOSPITAL ENCOUNTER (OUTPATIENT)
Dept: PHYSICAL THERAPY | Facility: HOSPITAL | Age: 55
Setting detail: THERAPIES SERIES
Discharge: HOME | End: 2025-06-24
Payer: COMMERCIAL

## 2025-06-24 DIAGNOSIS — M79.605 LEFT LEG PAIN: Primary | ICD-10-CM

## 2025-06-24 DIAGNOSIS — M25.552 ACUTE HIP PAIN, LEFT: ICD-10-CM

## 2025-06-24 PROCEDURE — 97110 THERAPEUTIC EXERCISES: CPT | Performed by: PHYSICAL THERAPIST

## 2025-06-24 PROCEDURE — 97140 MANUAL THERAPY 1/> REGIONS: CPT | Performed by: PHYSICAL THERAPIST

## 2025-06-24 NOTE — PROGRESS NOTES
Physical Therapy Daily Treatment Note      Patient: Karla Connell   : 1970  Referring practitioner: RONNIE Judge  Date of Initial Visit: Type: THERAPY  Episode: Left hip/leg apin  Today's Date: 2025  Patient seen for 8 sessions       Visit Diagnoses:    ICD-10-CM ICD-9-CM   1. Left leg pain  M79.605 729.5   2. Acute hip pain, left  M25.552 719.45       Subjective Evaluation    History of Present Illness    Subjective comment: Patient notes that she has no pain today.  She mentions that she feels like she is more upright today.       Objective   See Exercise, Manual, and Modality Logs for complete treatment.       Assessment & Plan       Assessment  Assessment details: Patient tolerated today's session well, noting 0/10 pain pre- and post-tx.  Therapy session initiated with manual therapy, with patient displaying good tolerance to manual lumbar traction.  Patient continues to display hypomobility at lumbar region with PA glides, but denies tenderness.  There ex progressed to include increased repetitions and the addition of new exercises.  Session concluded with cryotherapy.  She will continue to be progressed per her tolerance and POC.          Timed:         Manual Therapy:    25     mins  45401;     Therapeutic Exercise:    29     mins  04647;     Neuromuscular Bahman:        mins  68933;    Therapeutic Activity:          mins  38937;     Gait Training:           mins  44231;     Ultrasound:          mins  63502;    Ionto                                   mins   86890  Self Care                            mins   89274      Un-Timed:  Electrical Stimulation:         mins  35232 ( );  Dry Needling          mins self-pay  Traction          mins 34834  Canalith Repos         mins 46570  Ice-8 min    Timed Treatment:   54   mins   Total Treatment:     62   mins    Maryana Romero PT  [place] License: [id number]  Electronically signed by Maryana Romero PT, 25, 1:18 PM  EDT.

## 2025-06-30 ENCOUNTER — APPOINTMENT (OUTPATIENT)
Dept: MRI IMAGING | Facility: HOSPITAL | Age: 55
End: 2025-06-30
Payer: COMMERCIAL

## 2025-06-30 ENCOUNTER — HOSPITAL ENCOUNTER (EMERGENCY)
Facility: HOSPITAL | Age: 55
Discharge: HOME | End: 2025-06-30
Attending: STUDENT IN AN ORGANIZED HEALTH CARE EDUCATION/TRAINING PROGRAM | Admitting: STUDENT IN AN ORGANIZED HEALTH CARE EDUCATION/TRAINING PROGRAM
Payer: COMMERCIAL

## 2025-06-30 VITALS
BODY MASS INDEX: 30.05 KG/M2 | HEART RATE: 54 BPM | OXYGEN SATURATION: 99 % | RESPIRATION RATE: 18 BRPM | WEIGHT: 187 LBS | TEMPERATURE: 97.5 F | SYSTOLIC BLOOD PRESSURE: 140 MMHG | DIASTOLIC BLOOD PRESSURE: 80 MMHG | HEIGHT: 66 IN

## 2025-06-30 DIAGNOSIS — M48.061 SPINAL STENOSIS OF LUMBAR REGION, UNSPECIFIED WHETHER NEUROGENIC CLAUDICATION PRESENT: ICD-10-CM

## 2025-06-30 DIAGNOSIS — S39.012A STRAIN OF LUMBAR REGION, INITIAL ENCOUNTER: Primary | ICD-10-CM

## 2025-06-30 PROCEDURE — 99284 EMERGENCY DEPT VISIT MOD MDM: CPT

## 2025-06-30 PROCEDURE — 25010000002 KETOROLAC TROMETHAMINE PER 15 MG: Performed by: PHYSICIAN ASSISTANT

## 2025-06-30 PROCEDURE — 72148 MRI LUMBAR SPINE W/O DYE: CPT

## 2025-06-30 PROCEDURE — 96372 THER/PROPH/DIAG INJ SC/IM: CPT

## 2025-06-30 PROCEDURE — 25010000002 DEXAMETHASONE PER 1 MG: Performed by: PHYSICIAN ASSISTANT

## 2025-06-30 RX ORDER — DEXAMETHASONE SODIUM PHOSPHATE 10 MG/ML
10 INJECTION, SOLUTION INTRA-ARTICULAR; INTRALESIONAL; INTRAMUSCULAR; INTRAVENOUS; SOFT TISSUE ONCE
Status: COMPLETED | OUTPATIENT
Start: 2025-06-30 | End: 2025-06-30

## 2025-06-30 RX ORDER — KETOROLAC TROMETHAMINE 30 MG/ML
60 INJECTION, SOLUTION INTRAMUSCULAR; INTRAVENOUS ONCE
Status: COMPLETED | OUTPATIENT
Start: 2025-06-30 | End: 2025-06-30

## 2025-06-30 RX ADMIN — KETOROLAC TROMETHAMINE 60 MG: 60 INJECTION, SOLUTION INTRAMUSCULAR at 14:06

## 2025-06-30 RX ADMIN — DEXAMETHASONE SODIUM PHOSPHATE 10 MG: 10 INJECTION INTRAMUSCULAR; INTRAVENOUS at 14:06

## 2025-06-30 NOTE — ED PROVIDER NOTES
Subjective   History of Present Illness  This is a 54 year old female patient who presents to the ER with chief complaint of low back pain. PMH significant for chronic low back pain, hypothyroidism, depression. Patient has been battling low back pain for years. She has seen spinal specialists 5 years ago and had an MRI at that time. She has been doing PT but it is not helping. Patient's pain is located in the left sided low back with radiation into the posterior and lateral aspect of the left leg. She has diffuse numbness/tingling. Denies saddle anesthesia, bowel/bladder incontinence.       Review of Systems   Constitutional: Negative.  Negative for fever.   HENT: Negative.     Respiratory: Negative.     Cardiovascular: Negative.  Negative for chest pain.   Gastrointestinal: Negative.  Negative for abdominal pain.   Endocrine: Negative.    Genitourinary: Negative.  Negative for dysuria.   Musculoskeletal:  Positive for back pain. Negative for arthralgias, gait problem, joint swelling, myalgias, neck pain and neck stiffness.   Skin: Negative.    Neurological: Negative.    Psychiatric/Behavioral: Negative.     All other systems reviewed and are negative.      Past Medical History:   Diagnosis Date    Anxiety     Arthritis     Back problem     Bladder disorder     Bradycardia     Bulging lumbar disc     Disease of thyroid gland     Headache     Hx of migraines     Hypothyroidism ?    Low back pain 2012?    Neuromuscular disorder     Obesity        No Known Allergies    Past Surgical History:   Procedure Laterality Date    ABDOMINAL SURGERY      APPENDECTOMY       SECTION      CHOLECYSTECTOMY      ENDOSCOPY      LAPAROSCOPIC CHOLECYSTECTOMY      PANNICULECTOMY N/A 2020    Procedure: PANNICULECTOMY;  Surgeon: Queenie Gray MD;  Location: [facility];  Service: General;  Laterality: N/A;    THYROID SURGERY      WISDOM TOOTH EXTRACTION         Family History   Problem Relation Age of Onset     Diabetes Mother     Cancer Mother         lung    Arthritis Mother     Thyroid disease Mother     Hypertension Father     Diabetes Father     Cancer Father         kidney    Arthritis Father     Kidney disease Father     Kidney failure Father     Diabetes Sister     Hypertension Sister     Cancer Brother         prostate    Diabetes Brother     Thyroid disease Sister     Cancer Son     Breast cancer Neg Hx        Social History     Socioeconomic History    Marital status: Single   Tobacco Use    Smoking status: Former     Current packs/day: 0.00     Average packs/day: 2.0 packs/day for 26.1 years (52.2 ttl pk-yrs)     Types: Cigarettes     Start date: 1989     Quit date: 2015     Years since quittin.8    Smokeless tobacco: Never   Vaping Use    Vaping status: Former   Substance and Sexual Activity    Alcohol use: Never    Drug use: Never    Sexual activity: Yes     Partners: Male     Birth control/protection: Post-menopausal, None           Objective   Physical Exam  Vitals and nursing note reviewed.   Constitutional:       General: She is not in acute distress.     Appearance: She is well-developed. She is not diaphoretic.   HENT:      Head: Normocephalic and atraumatic.      Right Ear: External ear normal.      Left Ear: External ear normal.      Nose: Nose normal.   Eyes:      Conjunctiva/sclera: Conjunctivae normal.      Pupils: Pupils are equal, round, and reactive to light.   Neck:      Vascular: No JVD.      Trachea: No tracheal deviation.   Cardiovascular:      Rate and Rhythm: Normal rate and regular rhythm.      Heart sounds: Normal heart sounds. No murmur heard.  Pulmonary:      Effort: Pulmonary effort is normal. No respiratory distress.      Breath sounds: Normal breath sounds. No wheezing.   Abdominal:      General: Bowel sounds are normal.      Palpations: Abdomen is soft.      Tenderness: There is no abdominal tenderness.   Musculoskeletal:         General: No deformity. Normal range of  motion.      Cervical back: Normal range of motion and neck supple.      Comments: Tenderness to palpation in the paraspinal muscles of the Lspine on the left side. Neurovascular status and sensation BLE intact.    Skin:     General: Skin is warm and dry.      Coloration: Skin is not pale.      Findings: No erythema or rash.   Neurological:      General: No focal deficit present.      Mental Status: She is alert and oriented to person, place, and time. Mental status is at baseline.      Cranial Nerves: No cranial nerve deficit.      Sensory: No sensory deficit.      Motor: No weakness.      Coordination: Coordination normal.      Gait: Gait normal.      Deep Tendon Reflexes: Reflexes normal.   Psychiatric:         Behavior: Behavior normal.         Thought Content: Thought content normal.         Procedures           ED Course  ED Course as of 06/30/25 1350   Mon Jun 30, 2025   1341 MRI Lumbar Spine Without Contrast  IMPRESSION:  1.  Associated with broad-based posterior disc bulging, facet  arthropathy and ligamentum flavum thickening there is severe stenosis at  L3-4.  2.  L4-L5 disc desiccation with broad-based posterior herniation  contributing to moderate central canal stenosis and left neural  foraminal stenosis in concordance with facet arthropathy.     This report was finalized on 6/30/2025 1:38 PM by Dr. Gene Edwards MD   [MM]   1347 Patient diagnosed with low back strain and spinal stenosis. Will be d/c home to f/u with spinal specialist based on MRI findings. Will return to ER if symptoms worsen.  [MM]      ED Course User Index  [MM] Helen Caal PA                                                       Medical Decision Making    This is a 54 year old female patient who presents to the ER with chief complaint of low back pain. PMH significant for chronic low back pain, hypothyroidism, depression. Patient has been battling low back pain for years. She has seen spinal specialists 5 years ago and had  an MRI at that time. She has been doing PT but it is not helping. Patient's pain is located in the left sided low back with radiation into the posterior and lateral aspect of the left leg. She has diffuse numbness/tingling. Denies saddle anesthesia, bowel/bladder incontinence.       Problems Addressed:  Spinal stenosis of lumbar region, unspecified whether neurogenic claudication present: complicated acute illness or injury  Strain of lumbar region, initial encounter: complicated acute illness or injury    Amount and/or Complexity of Data Reviewed  Radiology: ordered. Decision-making details documented in ED Course.    Risk  Prescription drug management.        Final diagnoses:   Strain of lumbar region, initial encounter   Spinal stenosis of lumbar region, unspecified whether neurogenic claudication present       ED Disposition  ED Disposition       ED Disposition   Discharge    Condition   Stable    Comment   --               [facility],   [address] [place] [address] [phone]    Call today           Medication List      No changes were made to your prescriptions during this visit.            Helen Caal PA  06/30/25 [id number]

## 2025-08-04 ENCOUNTER — TRANSCRIBE ORDERS (OUTPATIENT)
Dept: ADMINISTRATIVE | Facility: HOSPITAL | Age: 55
End: 2025-08-04
Payer: COMMERCIAL

## 2025-08-04 DIAGNOSIS — M48.061 SPINAL STENOSIS, LUMBAR REGION, WITHOUT NEUROGENIC CLAUDICATION: Primary | ICD-10-CM

## 2025-08-05 ENCOUNTER — TRANSCRIBE ORDERS (OUTPATIENT)
Dept: ADMINISTRATIVE | Facility: HOSPITAL | Age: 55
End: 2025-08-05
Payer: COMMERCIAL

## 2025-08-05 DIAGNOSIS — Z12.31 VISIT FOR SCREENING MAMMOGRAM: Primary | ICD-10-CM

## 2025-08-11 ENCOUNTER — HOSPITAL ENCOUNTER (OUTPATIENT)
Facility: HOSPITAL | Age: 55
Discharge: HOME | End: 2025-08-11
Admitting: NURSE PRACTITIONER
Payer: COMMERCIAL

## 2025-08-11 DIAGNOSIS — M48.061 SPINAL STENOSIS, LUMBAR REGION, WITHOUT NEUROGENIC CLAUDICATION: ICD-10-CM

## 2025-08-11 PROCEDURE — 72131 CT LUMBAR SPINE W/O DYE: CPT

## (undated) DEVICE — ADHS LIQ MASTISOL 2/3ML

## (undated) DEVICE — SUT VIC 1 CTX 36IN J977H

## (undated) DEVICE — ANTIBACTERIAL UNDYED BRAIDED (POLYGLACTIN 910), SYNTHETIC ABSORBABLE SUTURE: Brand: COATED VICRYL

## (undated) DEVICE — SUT MNCRYL 4/0 PS2 18 IN

## (undated) DEVICE — BNDR ABD PREM 3PNL 9IN 46TO62IN

## (undated) DEVICE — AMD ANTIMICROBIAL NON-ADHERENT ISLAND DRESSING,0.2% POLYHEXAMETHYLENE BIGUANIDE HCI (PHMB): Brand: TELFA

## (undated) DEVICE — GLV SURG PREMIERPRO MIC LTX PF SZ7 BRN

## (undated) DEVICE — INTENDED FOR TISSUE SEPARATION, AND OTHER PROCEDURES THAT REQUIRE A SHARP SURGICAL BLADE TO PUNCTURE OR CUT.: Brand: BARD-PARKER ® STAINLESS STEEL BLADES

## (undated) DEVICE — SUT SILK 2/0 FS BLK 18IN 685G

## (undated) DEVICE — DRN WND HUBLSS FLUT FULL PERF SIL10MM

## (undated) DEVICE — HOLDER: Brand: DEROYAL

## (undated) DEVICE — ENSEAL 20 CM SHAFT, LARGE JAW: Brand: ENSEAL X1

## (undated) DEVICE — TRY SKINPREP PVP SCRB W PAINT

## (undated) DEVICE — PROXIMATE RH ROTATING HEAD SKIN STAPLERS (35 WIDE) CONTAINS 35 STAINLESS STEEL STAPLES: Brand: PROXIMATE

## (undated) DEVICE — PATIENT RETURN ELECTRODE, SINGLE-USE, CONTACT QUALITY MONITORING, ADULT, WITH 9FT CORD, FOR PATIENTS WEIGING OVER 33LBS. (15KG): Brand: MEGADYNE

## (undated) DEVICE — JACKSON-PRATT 100CC BULB RESERVOIR: Brand: CARDINAL HEALTH

## (undated) DEVICE — 3M™ STERI-STRIP™ REINFORCED ADHESIVE SKIN CLOSURES, R1547, 1/2 IN X 4 IN (12 MM X 100 MM), 6 STRIPS/ENVELOPE: Brand: 3M™ STERI-STRIP™

## (undated) DEVICE — DRSNG WND BORDR/ADHS NONADHR/GZ LF 4X14IN STRL

## (undated) DEVICE — PK BASIC 70

## (undated) DEVICE — TOWEL,OR,DSP,ST,BLUE,STD,4/PK,20PK/CS: Brand: MEDLINE

## (undated) DEVICE — MARKR SKIN W/RULR AND LBL

## (undated) DEVICE — TOTAL TRAY, 16FR 10ML SIL FOLEY, URN: Brand: MEDLINE

## (undated) DEVICE — SPNG LAP PREWSH SFTPK 18X18IN STRL PK/5

## (undated) DEVICE — PACK,UNIVERSAL,NO GOWNS: Brand: MEDLINE